# Patient Record
Sex: MALE | Race: WHITE | NOT HISPANIC OR LATINO | Employment: OTHER | URBAN - NONMETROPOLITAN AREA
[De-identification: names, ages, dates, MRNs, and addresses within clinical notes are randomized per-mention and may not be internally consistent; named-entity substitution may affect disease eponyms.]

---

## 2017-01-11 ENCOUNTER — ANESTHESIA EVENT (OUTPATIENT)
Dept: GASTROENTEROLOGY | Facility: HOSPITAL | Age: 82
End: 2017-01-11

## 2017-01-12 ENCOUNTER — ANESTHESIA (OUTPATIENT)
Dept: GASTROENTEROLOGY | Facility: HOSPITAL | Age: 82
End: 2017-01-12

## 2017-01-12 PROCEDURE — 25010000002 PROPOFOL 10 MG/ML EMULSION: Performed by: NURSE ANESTHETIST, CERTIFIED REGISTERED

## 2017-01-12 RX ORDER — PROPOFOL 10 MG/ML
VIAL (ML) INTRAVENOUS AS NEEDED
Status: DISCONTINUED | OUTPATIENT
Start: 2017-01-12 | End: 2017-01-12 | Stop reason: SURG

## 2017-01-12 RX ADMIN — PROPOFOL 50 MG: 10 INJECTION, EMULSION INTRAVENOUS at 08:22

## 2017-01-12 RX ADMIN — PROPOFOL 50 MG: 10 INJECTION, EMULSION INTRAVENOUS at 08:16

## 2017-01-12 RX ADMIN — PROPOFOL 50 MG: 10 INJECTION, EMULSION INTRAVENOUS at 08:15

## 2017-01-12 RX ADMIN — PROPOFOL 50 MG: 10 INJECTION, EMULSION INTRAVENOUS at 08:17

## 2017-01-12 RX ADMIN — PROPOFOL 50 MG: 10 INJECTION, EMULSION INTRAVENOUS at 08:13

## 2017-01-12 RX ADMIN — PROPOFOL 50 MG: 10 INJECTION, EMULSION INTRAVENOUS at 08:20

## 2017-01-12 RX ADMIN — PROPOFOL 50 MG: 10 INJECTION, EMULSION INTRAVENOUS at 08:24

## 2017-01-12 RX ADMIN — PROPOFOL 50 MG: 10 INJECTION, EMULSION INTRAVENOUS at 08:27

## 2017-01-12 NOTE — ANESTHESIA PREPROCEDURE EVALUATION
Anesthesia Evaluation      Airway   Mallampati: II  TM distance: >3 FB  Neck ROM: full  no difficulty expected  Dental - normal exam     Pulmonary - normal exam   (-) not a smoker  Cardiovascular - normal exam  (+) hypertension well controlled,   (-) past MI, angina, CABG    Neuro/Psych  (-) seizures, TIA, CVA  GI/Hepatic/Renal/Endo    (+) obesity,    (-) hiatal hernia, GERD, liver disease, renal disease    Musculoskeletal     Abdominal    Substance History   (+) alcohol use,      OB/GYN negative ob/gyn ROS         Other   (+) arthritis                          Anesthesia Plan    ASA 2     general     intravenous induction   Anesthetic plan and risks discussed with patient.

## 2017-01-12 NOTE — ANESTHESIA POSTPROCEDURE EVALUATION
Patient: Matthieu Simpson    Procedure Summary     Date Anesthesia Start Anesthesia Stop Room / Location    01/12/17 0811 0832  [facility] / [facility]       Procedure Diagnosis Surgeon Provider    COLONOSCOPY WITH ANESTHESIA (N/A ) History of colon cancer  (History of colon cancer [Z85.038]) DO Krishna Knight CRNA          Anesthesia Type: general  Last vitals  /75 (01/12/17 0734)    Temp 97.9 °F (36.6 °C) (01/12/17 0734)    Pulse 62 (01/12/17 0734)   Resp 20 (01/12/17 0734)    SpO2 95 % (01/12/17 0734)      Post Anesthesia Care and Evaluation    Patient location during evaluation: PHASE II  Patient participation: complete - patient participated  Level of consciousness: awake and sleepy but conscious  Pain score: 0  Pain management: adequate  Airway patency: patent  Anesthetic complications: No anesthetic complications  Cardiovascular status: acceptable  Respiratory status: acceptable  Hydration status: acceptable

## 2017-01-13 ENCOUNTER — TELEPHONE (OUTPATIENT)
Dept: GASTROENTEROLOGY | Facility: CLINIC | Age: 82
End: 2017-01-13

## 2017-01-24 ENCOUNTER — TRANSCRIBE ORDERS (OUTPATIENT)
Dept: ADMINISTRATIVE | Facility: HOSPITAL | Age: 82
End: 2017-01-24

## 2017-01-24 DIAGNOSIS — F03.90 SENILE DEMENTIA, UNCOMPLICATED (HCC): Primary | ICD-10-CM

## 2017-01-30 ENCOUNTER — HOSPITAL ENCOUNTER (OUTPATIENT)
Dept: ULTRASOUND IMAGING | Facility: HOSPITAL | Age: 82
Discharge: HOME | End: 2017-01-30
Attending: INTERNAL MEDICINE | Admitting: INTERNAL MEDICINE

## 2017-01-30 ENCOUNTER — HOSPITAL ENCOUNTER (OUTPATIENT)
Dept: MRI IMAGING | Facility: HOSPITAL | Age: 82
Discharge: HOME | End: 2017-01-30
Attending: INTERNAL MEDICINE

## 2017-01-30 DIAGNOSIS — F03.90 SENILE DEMENTIA, UNCOMPLICATED (HCC): ICD-10-CM

## 2017-01-30 LAB — CREAT BLDA-MCNC: 0.9 MG/DL (ref 0.6–1.3)

## 2017-01-30 PROCEDURE — 93880 EXTRACRANIAL BILAT STUDY: CPT | Performed by: SURGERY

## 2017-01-30 PROCEDURE — 82565 ASSAY OF CREATININE: CPT

## 2017-01-30 PROCEDURE — 70553 MRI BRAIN STEM W/O & W/DYE: CPT

## 2017-01-30 PROCEDURE — 0 GADOBENATE DIMEGLUMINE 529 MG/ML SOLUTION: Performed by: INTERNAL MEDICINE

## 2017-01-30 PROCEDURE — 93880 EXTRACRANIAL BILAT STUDY: CPT

## 2017-01-30 PROCEDURE — A9577 INJ MULTIHANCE: HCPCS | Performed by: INTERNAL MEDICINE

## 2017-01-30 RX ADMIN — GADOBENATE DIMEGLUMINE 19 ML: 529 INJECTION, SOLUTION INTRAVENOUS at 14:45

## 2017-10-02 ENCOUNTER — OFFICE VISIT (OUTPATIENT)
Dept: UROLOGY | Facility: CLINIC | Age: 82
End: 2017-10-02

## 2017-10-02 VITALS
HEIGHT: 71 IN | WEIGHT: 205.8 LBS | SYSTOLIC BLOOD PRESSURE: 124 MMHG | TEMPERATURE: 96.4 F | BODY MASS INDEX: 28.81 KG/M2 | DIASTOLIC BLOOD PRESSURE: 80 MMHG

## 2017-10-02 DIAGNOSIS — N13.8 BPH WITH OBSTRUCTION/LOWER URINARY TRACT SYMPTOMS: ICD-10-CM

## 2017-10-02 DIAGNOSIS — R97.20 ELEVATED PSA: Primary | ICD-10-CM

## 2017-10-02 DIAGNOSIS — N52.9 IMPOTENCE DUE TO ERECTILE DYSFUNCTION: ICD-10-CM

## 2017-10-02 DIAGNOSIS — N40.1 BPH WITH OBSTRUCTION/LOWER URINARY TRACT SYMPTOMS: ICD-10-CM

## 2017-10-02 LAB
BILIRUB BLD-MCNC: NEGATIVE MG/DL
CLARITY, POC: CLEAR
COLOR UR: YELLOW
GLUCOSE UR STRIP-MCNC: NEGATIVE MG/DL
KETONES UR QL: NEGATIVE
LEUKOCYTE EST, POC: NEGATIVE
NITRITE UR-MCNC: NEGATIVE MG/ML
PH UR: 5 [PH] (ref 5–8)
PROT UR STRIP-MCNC: NEGATIVE MG/DL
RBC # UR STRIP: NEGATIVE /UL
SP GR UR: 1.01 (ref 1–1.03)
UROBILINOGEN UR QL: NORMAL

## 2017-10-02 PROCEDURE — 81003 URINALYSIS AUTO W/O SCOPE: CPT | Performed by: UROLOGY

## 2017-10-02 PROCEDURE — 99214 OFFICE O/P EST MOD 30 MIN: CPT | Performed by: UROLOGY

## 2017-10-02 NOTE — PROGRESS NOTES
Subjective    Mr. Simpson is 81 y.o. male    CHIEF COMPLAINT: Elevated PSA    The patient has recently seen Dr. Cho he apparently for ED he did a load of tests on him and basically this showed a testosterone of 442 which I 21 for his age was well within normal limits his free testosterone was a little bit low but I told him in my opinion that I did not think taking testosterone be beneficial beneficial.    He also was noted to have elevated PSA of 6.2 he has had elevated PSAs before he has not had a biopsy on his PSA last year was 5.3 so it up a little bit has bounced around slightly over the years.    From a BPH point review his AUA score today is low at 7 he denies any gross hematuria he has occasional nocturia 1-2 no urgency frequency burning stinging in his symptoms are quite stable he has had no previous  operations    From an ED point review the they have he is Got a prescription for new pill to Dr. Feng Aranda gave him and he would like to try that and I certainly think is reasonable Cialis was not beneficial      History of Present Illness      The following portions of the patient's history were reviewed and updated as appropriate: allergies, current medications, past family history, past medical history, past social history, past surgical history and problem list.    Review of Systems   Constitutional: Negative.  Negative for chills and fever.   Gastrointestinal: Negative for abdominal distention, abdominal pain, anal bleeding, blood in stool and nausea.   Genitourinary: Negative for difficulty urinating, dysuria, flank pain, frequency, hematuria and urgency.   Psychiatric/Behavioral: Negative.  Negative for agitation and confusion.         Current Outpatient Prescriptions:   •  colesevelam (WELCHOL) 625 MG tablet, Take 1,875 mg by mouth 2 (Two) Times a Day With Meals., Disp: , Rfl:   •  esomeprazole (nexIUM) 40 MG capsule, Take 40 mg by mouth Every Morning Before Breakfast., Disp: , Rfl:   •   "fexofenadine (ALLEGRA) 180 MG tablet, Take 180 mg by mouth Daily., Disp: , Rfl:   •  levocetirizine (XYZAL) 5 MG tablet, Take 5 mg by mouth Every Evening., Disp: , Rfl:   •  metoprolol succinate XL (TOPROL-XL) 25 MG 24 hr tablet, Take 25 mg by mouth 2 (Two) Times a Day., Disp: , Rfl:   •  omeprazole (PriLOSEC) 40 MG capsule, Take 40 mg by mouth Daily., Disp: , Rfl:   •  ZOLPIDEM TARTRATE PO, Take 5 mg by mouth Daily., Disp: , Rfl:     Past Medical History:   Diagnosis Date   • Allergic rhinitis    • Anemia    • Arthritis    • BPH without obstruction/lower urinary tract symptoms    • Cancer     colon cancer   • Elevated PSA    • Hypertension        Past Surgical History:   Procedure Laterality Date   • APPENDECTOMY     • CHOLECYSTECTOMY     • COLON SURGERY  12/19/2007    colectomy   • COLONOSCOPY  12/19/2014   • COLONOSCOPY N/A 1/12/2017    Procedure: COLONOSCOPY WITH ANESTHESIA;  Surgeon: Aleks Garcia DO;  Location: [facility] ENDOSCOPY;  Service:    • GALLBLADDER SURGERY         Social History     Social History   • Marital status: Single     Spouse name: N/A   • Number of children: N/A   • Years of education: N/A     Social History Main Topics   • Smoking status: Former Smoker   • Smokeless tobacco: Never Used   • Alcohol use Yes      Comment: 1-2 daily   • Drug use: No   • Sexual activity: Defer     Other Topics Concern   • None     Social History Narrative       Family History   Problem Relation Age of Onset   • Liver cancer Father    • No Known Problems Mother    • Colon polyps Neg Hx    • Colon cancer Neg Hx        Objective    /80  Temp 96.4 °F (35.8 °C)  Ht 71\" (180.3 cm)  Wt 205 lb 12.8 oz (93.4 kg)  BMI 28.7 kg/m2    Physical Exam   Constitutional: He is oriented to person, place, and time. He appears well-developed and well-nourished.   Pulmonary/Chest: Effort normal.   Abdominal: Soft. He exhibits no distension and no mass. There is no tenderness. There is no rebound and no guarding. No " hernia.   Genitourinary: Penis normal. Rectal exam shows no mass, no tenderness and anal tone normal. Enlarged: for the age of the patient. Right testis shows no mass, no swelling and no tenderness. Left testis shows no mass, no swelling and no tenderness. No hypospadias. No discharge found.   Genitourinary Comments:  The urethral meatus normal in position without evidence of stricture. Epididymis without mass or tenderness. Vas Deferens is palpably normal.Anus and perineum without mass or tenderness. The prostate is approximately 30 ml. It is Symmetric, with a Soft consistency. There are no nodules present. . The seminal vesicles are Not palpable due to the size of the prostate.     Neurological: He is alert and oriented to person, place, and time.   Vitals reviewed.        Hospital Outpatient Visit on 01/30/2017   Component Date Value Ref Range Status   • Creatinine 01/30/2017 0.90  0.60 - 1.30 mg/dL Final    Serial Number: [id number]    : [id number]       Results for orders placed or performed in visit on 10/02/17   POC Urinalysis Dipstick, Automated   Result Value Ref Range    Color Yellow Yellow, Straw, Dark Yellow, Caryn    Clarity, UA Clear Clear    Glucose, UA Negative Negative, 1000 mg/dL (3+) mg/dL    Bilirubin Negative Negative    Ketones, UA Negative Negative    Specific Gravity  1.010 1.005 - 1.030    Blood, UA Negative Negative    pH, Urine 5.0 5.0 - 8.0    Protein, POC Negative Negative mg/dL    Urobilinogen, UA Normal Normal    Leukocytes Negative Negative    Nitrite, UA Negative Negative       Assessment and Plan    Matthieu was seen today for elevated psa.    Diagnoses and all orders for this visit:    Elevated PSA  -     POC Urinalysis Dipstick, Automated  -     PSA, Total & Free; Future    Impotence due to erectile dysfunction    BPH with obstruction/lower urinary tract symptoms    Plan--from an ED point review I told him again as noted above I probably would not take testosterone I do not think  in my professional opinion that this would be beneficial.    Also suggested that certainly try medication Dr. Aranda who given him is reasonable this does not work he will let me know.    From elevated PSA point review I told that this is just minimally elevated for his age that again we do not even check these and a lot of patients but certainly I think it would not be unreasonable to follow him up in 6 months we will repeat a PSA free and total make sure is not changing rapidly he was comes this he had no further questions.    From a BPH point review he is doing well and is not any medications we will just observe this

## 2017-10-07 ENCOUNTER — RESULTS ENCOUNTER (OUTPATIENT)
Dept: UROLOGY | Facility: CLINIC | Age: 82
End: 2017-10-07

## 2017-10-07 DIAGNOSIS — R97.20 ELEVATED PSA: ICD-10-CM

## 2018-03-21 LAB
PSA FREE MFR SERPL: 26.2 %
PSA FREE SERPL-MCNC: 1.81 NG/ML
PSA SERPL-MCNC: 6.9 NG/ML (ref 0–4)

## 2018-03-29 ENCOUNTER — OFFICE VISIT (OUTPATIENT)
Dept: UROLOGY | Facility: CLINIC | Age: 83
End: 2018-03-29

## 2018-03-29 DIAGNOSIS — N13.8 BPH WITH OBSTRUCTION/LOWER URINARY TRACT SYMPTOMS: ICD-10-CM

## 2018-03-29 DIAGNOSIS — N40.1 BPH WITH OBSTRUCTION/LOWER URINARY TRACT SYMPTOMS: ICD-10-CM

## 2018-03-29 DIAGNOSIS — R97.20 ELEVATED PSA: Primary | ICD-10-CM

## 2018-03-29 LAB
BILIRUB BLD-MCNC: NEGATIVE MG/DL
CLARITY, POC: CLEAR
COLOR UR: YELLOW
GLUCOSE UR STRIP-MCNC: NEGATIVE MG/DL
KETONES UR QL: NEGATIVE
LEUKOCYTE EST, POC: NEGATIVE
NITRITE UR-MCNC: NEGATIVE MG/ML
PH UR: 5.5 [PH] (ref 5–8)
PROT UR STRIP-MCNC: NEGATIVE MG/DL
RBC # UR STRIP: NEGATIVE /UL
SP GR UR: 1.03 (ref 1–1.03)
UROBILINOGEN UR QL: NORMAL

## 2018-03-29 PROCEDURE — 99214 OFFICE O/P EST MOD 30 MIN: CPT | Performed by: UROLOGY

## 2018-03-29 PROCEDURE — 81001 URINALYSIS AUTO W/SCOPE: CPT | Performed by: UROLOGY

## 2018-03-29 NOTE — PATIENT INSTRUCTIONS

## 2018-03-29 NOTE — PROGRESS NOTES
Subjective    Mr. Simpson is 82 y.o. male    CHIEF COMPLAINT: Elevated PSA    The patient comes back today for follow-up of an elevated PSA his most recently has increased and it was 6.8 with a free PSA of 26%.    He has not had a biopsy but he has had a slightly elevated PSA for many years.    From a BPH point review he is doing very well he does not take any medications for this he is not any gross hematuria is had no flank pain no real burning stinging urgency frequency as a way score today is only 4      History of Present Illness      The following portions of the patient's history were reviewed and updated as appropriate: allergies, current medications, past family history, past medical history, past social history, past surgical history and problem list.    Review of Systems   Constitutional: Negative.  Negative for chills and fever.   Gastrointestinal: Negative for abdominal distention, abdominal pain, anal bleeding, blood in stool and nausea.   Genitourinary: Negative for difficulty urinating, dysuria, flank pain, frequency, hematuria and urgency.   Psychiatric/Behavioral: Negative.  Negative for agitation and confusion.         Current Outpatient Prescriptions:   •  colesevelam (WELCHOL) 625 MG tablet, Take 1,875 mg by mouth 2 (Two) Times a Day With Meals., Disp: , Rfl:   •  esomeprazole (nexIUM) 40 MG capsule, Take 40 mg by mouth Every Morning Before Breakfast., Disp: , Rfl:   •  fexofenadine (ALLEGRA) 180 MG tablet, Take 180 mg by mouth Daily., Disp: , Rfl:   •  levocetirizine (XYZAL) 5 MG tablet, Take 5 mg by mouth Every Evening., Disp: , Rfl:   •  metoprolol succinate XL (TOPROL-XL) 25 MG 24 hr tablet, Take 25 mg by mouth 2 (Two) Times a Day., Disp: , Rfl:   •  omeprazole (PriLOSEC) 40 MG capsule, Take 40 mg by mouth Daily., Disp: , Rfl:   •  ZOLPIDEM TARTRATE PO, Take 5 mg by mouth Daily., Disp: , Rfl:     Past Medical History:   Diagnosis Date   • Allergic rhinitis    • Anemia    • Arthritis    • BPH  without obstruction/lower urinary tract symptoms    • Cancer     colon cancer   • Elevated PSA    • Hypertension        Past Surgical History:   Procedure Laterality Date   • APPENDECTOMY     • CHOLECYSTECTOMY     • COLON SURGERY  12/19/2007    colectomy   • COLONOSCOPY  12/19/2014   • COLONOSCOPY N/A 1/12/2017    Procedure: COLONOSCOPY WITH ANESTHESIA;  Surgeon: Aleks Garcia DO;  Location: [facility] ENDOSCOPY;  Service:    • GALLBLADDER SURGERY         Social History     Social History   • Marital status: Single     Social History Main Topics   • Smoking status: Former Smoker   • Smokeless tobacco: Never Used   • Alcohol use Yes      Comment: 1-2 daily   • Drug use: No   • Sexual activity: Defer     Other Topics Concern   • Not on file       Family History   Problem Relation Age of Onset   • Liver cancer Father    • No Known Problems Mother    • Colon polyps Neg Hx    • Colon cancer Neg Hx        Objective    There were no vitals taken for this visit.    Physical Exam      Results Encounter on 10/07/2017   Component Date Value Ref Range Status   • PSA 03/21/2018 6.9* 0.0 - 4.0 ng/mL Final    Comment: Roche ECLIA methodology.  According to the American Urological Association, Serum PSA should  decrease and remain at undetectable levels after radical  prostatectomy. The AUA defines biochemical recurrence as an initial  PSA value 0.2 ng/mL or greater followed by a subsequent confirmatory  PSA value 0.2 ng/mL or greater.  Values obtained with different assay methods or kits cannot be used  interchangeably. Results cannot be interpreted as absolute evidence  of the presence or absence of malignant disease.     • PSA, Free 03/21/2018 1.81  N/A ng/mL Final   • PSA, Free % 03/21/2018 26.2  % Final    Comment: The table below lists the probability of prostate cancer for  men with non-suspicious MILLER results and total PSA between  4 and 10 ng/mL, by patient age (Seven et al, ESPERANZA 1998,  279:1542).                    %  Free PSA       50-64 yr        65-75 yr                    0.00-10.00%        56%             55%                   10.01-15.00%        24%             35%                   15.01-20.00%        17%             23%                   20.01-25.00%        10%             20%                        >25.00%         5%              9%  Please note:  Seven et al did not make specific                recommendations regarding the use of                percent free PSA for any other population                of men.         Results for orders placed or performed in visit on 03/29/18   POC Urinalysis Dipstick, Automated   Result Value Ref Range    Color Yellow Yellow, Straw, Dark Yellow, Caryn    Clarity, UA Clear Clear    Glucose, UA Negative Negative, 1000 mg/dL (3+) mg/dL    Bilirubin Negative Negative    Ketones, UA Negative Negative    Specific Gravity  1.030 1.005 - 1.030    Blood, UA Negative Negative    pH, Urine 5.5 5.0 - 8.0    Protein, POC Negative Negative mg/dL    Urobilinogen, UA Normal Normal    Leukocytes Negative Negative    Nitrite, UA Negative Negative       Assessment and Plan    Matthieu was seen today for elevated psa.    Diagnoses and all orders for this visit:    Elevated PSA  -     POC Urinalysis Dipstick, Automated  -     PSA DIAGNOSTIC; Future    BPH with obstruction/lower urinary tract symptoms      Plan--from the see the patient back again in 1 year's time I did discussed the PSA with him and his potential risk for prostate cancer I told him that I cannot guarantee him that he does not have prostate cancer but certainly with the free PSA being okay and at his age probably would not worry about it too much and he is in agreement he does not want to proceed with the biopsy.    From a BPH point review he is stable as well we will seen back again in 1 year with a PSA call sooner as needed

## 2018-04-03 ENCOUNTER — RESULTS ENCOUNTER (OUTPATIENT)
Dept: UROLOGY | Facility: CLINIC | Age: 83
End: 2018-04-03

## 2018-04-03 DIAGNOSIS — R97.20 ELEVATED PSA: ICD-10-CM

## 2018-11-27 ENCOUNTER — TELEPHONE (OUTPATIENT)
Dept: GASTROENTEROLOGY | Facility: CLINIC | Age: 83
End: 2018-11-27

## 2018-11-27 NOTE — TELEPHONE ENCOUNTER
Pt records from physician reviewed  According to medical record Matthieu Simpson does not have a history of heart disease or lung disease.  According to medical record Matthieu Simpson is not currently on blood thinner.  According to medical record Matthieu Simpson is not currently exhibiting abdominal pain, weight loss, hematochezia, melena,  change in bowels, or other symptoms to indicate pt would need to be seen in office.    Will submit order for Matthieu Simpson to proceed with CScope    Medication will be verified as well as a lack of GI related symptomology when pt is scheduled for procedure.    Pt called wishing to schedule apt for cscope    He has a hx of CRC

## 2019-01-21 NOTE — PROGRESS NOTES
Chief Complaint   Patient presents with   • Colonoscopy     1-12-17 colon 2 year recall diverticulosis       PCP: Francis Aranda MD  REFER: No ref. provider found    Subjective     HPI    Matthieu Simpson is a 83 y.o. male who presents to office for preventative maintenance.  There is  a personal history of colon polyps.  There is a history of colon cancer-2007.  He does not have complaints of nausea/vomiting, change in bowels, weight loss, no BRBPR, no melena.  There is not a family history of colon cancer.  There is not a family history of colon polyps.  Pt last colonoscopy-1/2017 .  Bowels do move on regular basis.    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    • Cancer (CMS/HCC)     colon cancer   • Elevated PSA    • Hypertension      Past Surgical History:   Procedure Laterality Date   • APPENDECTOMY     • CHOLECYSTECTOMY     • COLON SURGERY  12/19/2007    colectomy   • COLONOSCOPY  12/19/2014   • COLONOSCOPY N/A 1/12/2017    Procedure: COLONOSCOPY WITH ANESTHESIA;  Surgeon: Aleks Garcia DO;  Location: [facility] ENDOSCOPY;  Service:    • GALLBLADDER SURGERY       Outpatient Medications Marked as Taking for the 1/22/19 encounter (Office Visit) with Vijay ePrry APRN   Medication Sig Dispense Refill   • citalopram (CeleXA) 20 MG tablet Take 20 mg by mouth Daily.     • Loperamide HCl (ANTI-DIARRHEAL PO) Take  by mouth.     • metoprolol succinate XL (TOPROL-XL) 25 MG 24 hr tablet Take 25 mg by mouth 2 (Two) Times a Day.     • omeprazole (PriLOSEC) 40 MG capsule Take 40 mg by mouth Daily.       Allergies    Allergen Reactions   • Amoxicillin-Pot Clavulanate      Social History     Socioeconomic History   • Marital status: Single     Spouse name: Not on file   • Number of children: Not on file   • Years of education: Not on file   • Highest education level: Not on file   Social Needs   • Financial resource strain: Not on file   • Food insecurity - worry: Not on file   • Food insecurity - inability: Not on file   • Transportation needs - medical: Not on file   • Transportation needs - non-medical: Not on file   Occupational History   • Not on file   Tobacco Use   • Smoking status: Former Smoker     Last attempt to quit: 195     Years since quittin.0   • Smokeless tobacco: Never Used   Substance and Sexual Activity   • Alcohol use: Yes     Comment: sometimes   • Drug use: No   • Sexual activity: Defer   Other Topics Concern   • Not on file   Social History Narrative   • Not on file     Family History   Problem Relation Age of Onset   • Liver cancer Father    • No Known Problems Mother    • Colon polyps Neg Hx    • Colon cancer Neg Hx      Review of Systems   Constitutional: Negative for fatigue, fever and unexpected weight change.   HENT: Negative for hearing loss, sore throat and voice change.    Eyes: Negative for visual disturbance.   Respiratory: Negative for cough, shortness of breath and wheezing.    Cardiovascular: Negative for chest pain and palpitations.   Gastrointestinal: Negative for abdominal pain, blood in stool and vomiting.   Endocrine: Negative for polydipsia and polyuria.   Genitourinary: Negative for difficulty urinating, dysuria, hematuria and urgency.   Musculoskeletal: Negative for joint swelling and myalgias.   Skin: Negative for color change, rash and wound.   Neurological: Negative for dizziness, tremors, seizures and syncope.   Hematological: Does not bruise/bleed easily.   Psychiatric/Behavioral: Negative for agitation and confusion. The patient is not nervous/anxious.      Objective    Vitals:    01/22/19 1408   BP: 120/80   Pulse: 67   Temp: 97.4 °F (36.3 °C)   SpO2: 98%     Physical Exam   Constitutional: He is oriented to person, place, and time. He appears well-developed and well-nourished. He is cooperative.   HENT:   Head: Normocephalic and atraumatic.   Eyes: Conjunctivae are normal. Pupils are equal, round, and reactive to light. No scleral icterus.   Neck: Normal range of motion. Neck supple. No JVD present. No thyroid mass and no thyromegaly present.   Cardiovascular: Normal rate, regular rhythm and normal heart sounds. Exam reveals no gallop and no friction rub.   No murmur heard.  Pulmonary/Chest: Effort normal and breath sounds normal. No accessory muscle usage. No respiratory distress. He has no wheezes. He has no rales.   Abdominal: Soft. Normal appearance and bowel sounds are normal. He exhibits no distension, no ascites and no mass. There is no hepatosplenomegaly. There is no tenderness. There is no rebound and no guarding.   Musculoskeletal: Normal range of motion. He exhibits no edema or tenderness.     Vascular Status -  His right foot exhibits normal foot vasculature  and no edema. His left foot exhibits normal foot vasculature  and no edema.  Lymphadenopathy:     He has no cervical adenopathy.   Neurological: He is alert and oriented to person, place, and time. He has normal strength. Gait normal.   Skin: Skin is warm, dry and intact. No rash noted.     Imaging Results (most recent)     None        Body mass index is 29.7 kg/m².    Assessment/Plan   Matthieu was seen today for colonoscopy.    Diagnoses and all orders for this visit:    History of colon cancer  -     Case Request; Standing  -     Implement Anesthesia Orders Day of Procedure; Standing  -     Obtain Informed Consent; Standing  -     polyethylene glycol (GoLYTELY) 236 g solution; Take 3,785 mL by mouth 1 (One) Time for 1 dose. Take as directed  -     Case Request    History of colon polyps      COLONOSCOPY WITH  ANESTHESIA (N/A)    Patient is to continue all blood pressure and cardiac medications prior to procedure and has been advised to take medications morning of procedure   Pt verbalized understanding      All risks, benefits, alternatives, and indications of colonoscopy procedure have been discussed with the patient. Risks to include perforation of the colon requiring possible surgery or colostomy, risk of bleeding from biopsies or removal of colon tissue, possibility of missing a colon polyp or cancer, or adverse drug reaction.  Benefits to include the diagnosis and management of disease of the colon and rectum. Alternatives to include barium enema, radiographic evaluation, lab testing or no intervention. Pt verbalizes understanding and agrees.     Patient's Body mass index is 29.7 kg/m². BMI is above normal parameters. Recommendations include: no follow-up required.      There are no Patient Instructions on file for this visit.

## 2019-01-22 ENCOUNTER — OFFICE VISIT (OUTPATIENT)
Dept: GASTROENTEROLOGY | Facility: CLINIC | Age: 84
End: 2019-01-22

## 2019-01-22 VITALS
BODY MASS INDEX: 29.63 KG/M2 | SYSTOLIC BLOOD PRESSURE: 120 MMHG | HEIGHT: 70 IN | HEART RATE: 67 BPM | TEMPERATURE: 97.4 F | DIASTOLIC BLOOD PRESSURE: 80 MMHG | OXYGEN SATURATION: 98 % | WEIGHT: 207 LBS

## 2019-01-22 DIAGNOSIS — Z86.010 HISTORY OF COLON POLYPS: ICD-10-CM

## 2019-01-22 DIAGNOSIS — Z85.038 HISTORY OF COLON CANCER: Primary | ICD-10-CM

## 2019-01-22 PROCEDURE — S0260 H&P FOR SURGERY: HCPCS | Performed by: NURSE PRACTITIONER

## 2019-01-22 RX ORDER — CITALOPRAM 20 MG/1
20 TABLET ORAL DAILY
COMMUNITY
End: 2020-07-16

## 2019-01-31 ENCOUNTER — ANESTHESIA EVENT (OUTPATIENT)
Dept: GASTROENTEROLOGY | Facility: HOSPITAL | Age: 84
End: 2019-01-31

## 2019-01-31 ENCOUNTER — HOSPITAL ENCOUNTER (OUTPATIENT)
Facility: HOSPITAL | Age: 84
Setting detail: HOSPITAL OUTPATIENT SURGERY
Discharge: HOME | End: 2019-01-31
Attending: INTERNAL MEDICINE | Admitting: INTERNAL MEDICINE

## 2019-01-31 ENCOUNTER — ANESTHESIA (OUTPATIENT)
Dept: GASTROENTEROLOGY | Facility: HOSPITAL | Age: 84
End: 2019-01-31

## 2019-01-31 VITALS
RESPIRATION RATE: 21 BRPM | DIASTOLIC BLOOD PRESSURE: 79 MMHG | HEIGHT: 70 IN | OXYGEN SATURATION: 96 % | HEART RATE: 61 BPM | WEIGHT: 198 LBS | SYSTOLIC BLOOD PRESSURE: 156 MMHG | BODY MASS INDEX: 28.35 KG/M2 | TEMPERATURE: 97.7 F

## 2019-01-31 DIAGNOSIS — Z85.038 HISTORY OF COLON CANCER: ICD-10-CM

## 2019-01-31 PROCEDURE — 25010000002 PROPOFOL 10 MG/ML EMULSION: Performed by: NURSE ANESTHETIST, CERTIFIED REGISTERED

## 2019-01-31 PROCEDURE — G0105 COLORECTAL SCRN; HI RISK IND: HCPCS | Performed by: INTERNAL MEDICINE

## 2019-01-31 RX ORDER — SODIUM CHLORIDE 0.9 % (FLUSH) 0.9 %
3 SYRINGE (ML) INJECTION AS NEEDED
Status: DISCONTINUED | OUTPATIENT
Start: 2019-01-31 | End: 2019-01-31 | Stop reason: HOSPADM

## 2019-01-31 RX ORDER — PROPOFOL 10 MG/ML
VIAL (ML) INTRAVENOUS AS NEEDED
Status: DISCONTINUED | OUTPATIENT
Start: 2019-01-31 | End: 2019-01-31 | Stop reason: SURG

## 2019-01-31 RX ORDER — LIDOCAINE HYDROCHLORIDE 20 MG/ML
INJECTION, SOLUTION INFILTRATION; PERINEURAL AS NEEDED
Status: DISCONTINUED | OUTPATIENT
Start: 2019-01-31 | End: 2019-01-31 | Stop reason: SURG

## 2019-01-31 RX ORDER — SODIUM CHLORIDE 9 MG/ML
500 INJECTION, SOLUTION INTRAVENOUS CONTINUOUS PRN
Status: DISCONTINUED | OUTPATIENT
Start: 2019-01-31 | End: 2019-01-31 | Stop reason: HOSPADM

## 2019-01-31 RX ADMIN — SODIUM CHLORIDE 500 ML: 9 INJECTION, SOLUTION INTRAVENOUS at 09:38

## 2019-01-31 RX ADMIN — LIDOCAINE HYDROCHLORIDE 100 MG: 20 INJECTION, SOLUTION INFILTRATION; PERINEURAL at 09:55

## 2019-01-31 RX ADMIN — PROPOFOL 290 MG: 10 INJECTION, EMULSION INTRAVENOUS at 09:55

## 2019-01-31 NOTE — ANESTHESIA PREPROCEDURE EVALUATION
Anesthesia Evaluation     Patient summary reviewed   no history of anesthetic complications:  NPO Solid Status: > 8 hours             Airway   Mallampati: II  TM distance: >3 FB  Neck ROM: full  Dental      Pulmonary - negative pulmonary ROS   Cardiovascular   Exercise tolerance: excellent (>7 METS)    Patient on routine beta blocker and Beta blocker given within 24 hours of surgery    (+) hypertension,       Neuro/Psych- negative ROS  GI/Hepatic/Renal/Endo - negative ROS     Musculoskeletal     Abdominal    Substance History      OB/GYN          Other                        Anesthesia Plan    ASA 2     MAC     intravenous induction   Anesthetic plan, all risks, benefits, and alternatives have been provided, discussed and informed consent has been obtained with: patient.

## 2019-01-31 NOTE — ANESTHESIA POSTPROCEDURE EVALUATION
Patient: Matthieu Simpson    Procedure Summary     Date:  01/31/19 Room / Location:  [facility] ENDOSCOPY 6 / [facility] END[facility]COPY    Anesthesia Start:  0952 Anesthesia Stop:  1006    Procedure:  COLONOSCOPY WITH ANESTHESIA (N/A ) Diagnosis:       History of colon cancer      (History of colon cancer [Z85.038])    Surgeon:  Aleks Garcia DO Provider:  Americo Mckenna CRNA    Anesthesia Type:  MAC ASA Status:  2          Anesthesia Type: MAC  Last vitals  BP   156/79 (01/31/19 0908)   Temp   97.7 °F (36.5 °C) (01/31/19 0908)   Pulse   61 (01/31/19 0908)   Resp   21 (01/31/19 1025)     SpO2   96 % (01/31/19 0908)     Post Anesthesia Care and Evaluation    Patient location during evaluation: PHASE II  Level of consciousness: awake and alert  Pain management: adequate  Airway patency: patent  Anesthetic complications: No anesthetic complications    Cardiovascular status: acceptable  Respiratory status: acceptable  Hydration status: acceptable

## 2019-04-02 DIAGNOSIS — R97.20 ELEVATED PSA: ICD-10-CM

## 2019-04-02 DIAGNOSIS — R97.20 ELEVATED PSA: Primary | ICD-10-CM

## 2019-04-03 LAB — PSA SERPL-MCNC: 5.92 NG/ML (ref 0–4)

## 2019-04-16 ENCOUNTER — TELEPHONE (OUTPATIENT)
Dept: UROLOGY | Facility: CLINIC | Age: 84
End: 2019-04-16

## 2019-04-16 NOTE — TELEPHONE ENCOUNTER
Patient was a no show with Dr. Melton on 4- for yearly follow up. He was called and rescheduled with Dr. Ma. This is his 1st no show.

## 2019-04-19 NOTE — PROGRESS NOTES
Mr. Simpson is 83 y.o. male    CHIEF COMPLAINT: I am here for follow up on BPH. My PSA is 5.920    HPI    The patient comes back today for follow-up of an elevated PSA his most recently has increased and it was 6.8 with a free PSA of 26%.     He has not had a biopsy but he has had a slightly elevated PSA for many years.     From a BPH point review he is doing very well he does not take any medications for this he is not any gross hematuria is had no flank pain no real burning stinging urgency frequency as a way score today is only 4    Lab Results   Component Value Date    PSA 5.920 (H) 04/02/2019    PSA 6.9 (H) 03/20/2018    PSA 5.360 (H) 10/24/2016       The following portions of the patient's history were reviewed and updated as appropriate: allergies, current medications, past family history, past medical history, past social history, past surgical history and problem list.      Review of Systems   Constitutional: Negative for chills and fever.   Gastrointestinal: Negative for abdominal pain, anal bleeding and blood in stool.   Genitourinary: Negative for decreased urine volume, difficulty urinating, discharge, dysuria, enuresis, flank pain, frequency, genital sores, hematuria, penile pain, penile swelling, scrotal swelling, testicular pain and urgency.           Current Outpatient Medications:   •  citalopram (CeleXA) 20 MG tablet, Take 20 mg by mouth Daily., Disp: , Rfl:   •  fexofenadine (ALLEGRA) 180 MG tablet, Take 180 mg by mouth Daily., Disp: , Rfl:   •  levocetirizine (XYZAL) 5 MG tablet, Take 5 mg by mouth Every Evening., Disp: , Rfl:   •  Loperamide HCl (ANTI-DIARRHEAL PO), Take  by mouth., Disp: , Rfl:   •  metoprolol succinate XL (TOPROL-XL) 25 MG 24 hr tablet, Take 25 mg by mouth 2 (Two) Times a Day., Disp: , Rfl:   •  ZOLPIDEM TARTRATE PO, Take 5 mg by mouth Daily., Disp: , Rfl:     Past Medical History:   Diagnosis Date   • Allergic rhinitis    • Anemia    • Arthritis    • BPH without  "obstruction/lower urinary tract symptoms    • Cancer (CMS/HCC)     colon cancer   • Elevated PSA    • Hypertension        Past Surgical History:   Procedure Laterality Date   • APPENDECTOMY     • CHOLECYSTECTOMY     • COLON SURGERY  2007    colectomy   • COLONOSCOPY  2014   • COLONOSCOPY N/A 2017    Procedure: COLONOSCOPY WITH ANESTHESIA;  Surgeon: Aleks Garcia DO;  Location: [facility] ENDOSCOPY;  Service:    • COLONOSCOPY N/A 2019    Procedure: COLONOSCOPY WITH ANESTHESIA;  Surgeon: Aleks Garcia DO;  Location: [facility] ENDOSCOPY;  Service: Gastroenterology   • GALLBLADDER SURGERY         Social History     Socioeconomic History   • Marital status: Single     Spouse name: Not on file   • Number of children: Not on file   • Years of education: Not on file   • Highest education level: Not on file   Tobacco Use   • Smoking status: Former Smoker     Last attempt to quit: 1958     Years since quittin.3   • Smokeless tobacco: Never Used   Substance and Sexual Activity   • Alcohol use: Yes     Comment: sometimes   • Drug use: No   • Sexual activity: Defer       Family History   Problem Relation Age of Onset   • Liver cancer Father    • No Known Problems Mother    • Colon polyps Neg Hx    • Colon cancer Neg Hx          Temp 97.6 °F (36.4 °C)   Ht 177.8 cm (70\")   Wt 91.1 kg (200 lb 12.8 oz)   BMI 28.81 kg/m²       Physical Exam      Data  Results for orders placed or performed in visit on 19   POC Urinalysis Dipstick, Multipro   Result Value Ref Range    Color Yellow Yellow, Straw, Dark Yellow, Caryn    Clarity, UA Clear Clear    Glucose, UA Negative Negative, 1000 mg/dL (3+) mg/dL    Bilirubin Negative Negative    Ketones, UA Negative Negative    Specific Gravity  1.010 1.005 - 1.030    Blood, UA Negative Negative    pH, Urine 6.0 5.0 - 8.0    Protein, POC Negative Negative mg/dL    Urobilinogen, UA Normal Normal    Nitrite, UA Negative Negative    Leukocytes Negative Negative "         Patient's Body mass index is 28.81 kg/m². BMI is above normal parameters. Recommendations include: educational material.    International Prostate Symptom Score  The following is posted based on patient questionnaire answers:  0 - not at all    1-7 mild symptoms  1- Less than one time in five  8-19 moderate symptoms  2 -Less than half the time  20-35 severe symptoms  3 - About half the time  4 - More than half the time  5 - Almost always     For following sections:  Incomplete Emptying: - How often have you had the sensation  of not emptying your bladder completely after you finished urinating?  0   Frequency: -How often have you had to urinate again less than   two hours after you finished urinating?      1  Intermittency: -How often have you found you stopped and started again  Several times when you urinate?       0  Urgency: -How often do you find it difficult to postpone urination?             0  Weak stream: - How often have you had a weak urinary stream?             0  Straining: - How often have you had to push or strain to begin  Urination?          0  Sleeping: -How many times did you most typically get up to urinate   From the time you went to bed at night until the time you got up in the   2  Morning          Total `  3    Quality of Life  How would you feel if you had to live with your urinary condition the way   1  It is now, no better, no worse for the rest of your life?    Where: 0=delighted; 1= pleased, 2= mostly satisfied, 3= mixed, 4 = mostly  Dissatisfied, 5= Unhappy, 6 = terrible      Assessment and Plan  Diagnoses and all orders for this visit:    Elevated PSA    BPH with obstruction/lower urinary tract symptoms  -     POC Urinalysis Dipstick, Multipro    -Patient left without be evaluated or managed.     (Please note that portions of this note were completed with a voice recognition program.)  Juan Ma MD  04/29/19  9:33 AM      Cc: Francis Aranda MD

## 2019-04-29 ENCOUNTER — OFFICE VISIT (OUTPATIENT)
Dept: UROLOGY | Facility: CLINIC | Age: 84
End: 2019-04-29

## 2019-04-29 VITALS — WEIGHT: 200.8 LBS | BODY MASS INDEX: 28.75 KG/M2 | HEIGHT: 70 IN | TEMPERATURE: 97.6 F

## 2019-04-29 DIAGNOSIS — N40.1 BPH WITH OBSTRUCTION/LOWER URINARY TRACT SYMPTOMS: ICD-10-CM

## 2019-04-29 DIAGNOSIS — N13.8 BPH WITH OBSTRUCTION/LOWER URINARY TRACT SYMPTOMS: ICD-10-CM

## 2019-04-29 DIAGNOSIS — R97.20 ELEVATED PSA: Primary | ICD-10-CM

## 2019-04-29 LAB
BILIRUB BLD-MCNC: NEGATIVE MG/DL
CLARITY, POC: CLEAR
COLOR UR: YELLOW
GLUCOSE UR STRIP-MCNC: NEGATIVE MG/DL
KETONES UR QL: NEGATIVE
LEUKOCYTE EST, POC: NEGATIVE
NITRITE UR-MCNC: NEGATIVE MG/ML
PH UR: 6 [PH] (ref 5–8)
PROT UR STRIP-MCNC: NEGATIVE MG/DL
RBC # UR STRIP: NEGATIVE /UL
SP GR UR: 1.01 (ref 1–1.03)
UROBILINOGEN UR QL: NORMAL

## 2019-04-29 PROCEDURE — 99024 POSTOP FOLLOW-UP VISIT: CPT | Performed by: UROLOGY

## 2019-04-29 PROCEDURE — 81003 URINALYSIS AUTO W/O SCOPE: CPT | Performed by: UROLOGY

## 2019-04-29 NOTE — PATIENT INSTRUCTIONS

## 2019-07-05 ENCOUNTER — TELEPHONE (OUTPATIENT)
Dept: GASTROENTEROLOGY | Facility: CLINIC | Age: 84
End: 2019-07-05

## 2019-08-31 ENCOUNTER — APPOINTMENT (OUTPATIENT)
Dept: CT IMAGING | Age: 84
DRG: 065 | End: 2019-08-31
Payer: MEDICARE

## 2019-08-31 ENCOUNTER — APPOINTMENT (OUTPATIENT)
Dept: GENERAL RADIOLOGY | Age: 84
DRG: 065 | End: 2019-08-31
Payer: MEDICARE

## 2019-08-31 ENCOUNTER — HOSPITAL ENCOUNTER (INPATIENT)
Age: 84
LOS: 5 days | Discharge: NURSING, REHAB OR LONG-TERM CARE | DRG: 065 | End: 2019-09-05
Attending: EMERGENCY MEDICINE | Admitting: HOSPITALIST
Payer: MEDICARE

## 2019-08-31 DIAGNOSIS — T07.XXXA MULTIPLE BRUISES: ICD-10-CM

## 2019-08-31 DIAGNOSIS — I63.9 CEREBROVASCULAR ACCIDENT (CVA), UNSPECIFIED MECHANISM (HCC): ICD-10-CM

## 2019-08-31 DIAGNOSIS — R29.6 MULTIPLE FALLS: Primary | ICD-10-CM

## 2019-08-31 DIAGNOSIS — R41.0 CONFUSION: ICD-10-CM

## 2019-08-31 DIAGNOSIS — D72.829 LEUKOCYTOSIS, UNSPECIFIED TYPE: ICD-10-CM

## 2019-08-31 PROBLEM — I69.993 CVA, OLD, ATAXIA: Status: ACTIVE | Noted: 2019-08-31

## 2019-08-31 LAB
ALBUMIN SERPL-MCNC: 4.4 G/DL (ref 3.5–5.2)
ALP BLD-CCNC: 40 U/L (ref 40–130)
ALT SERPL-CCNC: 13 U/L (ref 5–41)
AMPHETAMINE SCREEN, URINE: NEGATIVE
ANION GAP SERPL CALCULATED.3IONS-SCNC: 17 MMOL/L (ref 7–19)
AST SERPL-CCNC: 33 U/L (ref 5–40)
BACTERIA: NEGATIVE /HPF
BARBITURATE SCREEN URINE: NEGATIVE
BENZODIAZEPINE SCREEN, URINE: NEGATIVE
BILIRUB SERPL-MCNC: 0.9 MG/DL (ref 0.2–1.2)
BILIRUBIN URINE: NEGATIVE
BLOOD, URINE: ABNORMAL
BUN BLDV-MCNC: 25 MG/DL (ref 8–23)
CALCIUM SERPL-MCNC: 9.5 MG/DL (ref 8.8–10.2)
CANNABINOID SCREEN URINE: NEGATIVE
CHLORIDE BLD-SCNC: 100 MMOL/L (ref 98–111)
CLARITY: CLEAR
CO2: 23 MMOL/L (ref 22–29)
COCAINE METABOLITE SCREEN URINE: NEGATIVE
COLOR: YELLOW
CREAT SERPL-MCNC: 1 MG/DL (ref 0.5–1.2)
EPITHELIAL CELLS, UA: 1 /HPF (ref 0–5)
GFR NON-AFRICAN AMERICAN: >60
GLUCOSE BLD-MCNC: 108 MG/DL (ref 74–109)
GLUCOSE URINE: NEGATIVE MG/DL
HCT VFR BLD CALC: 40.2 % (ref 42–52)
HEMOGLOBIN: 13.5 G/DL (ref 14–18)
HYALINE CASTS: 3 /HPF (ref 0–8)
INR BLD: 1.13 (ref 0.88–1.18)
KETONES, URINE: 15 MG/DL
LEUKOCYTE ESTERASE, URINE: NEGATIVE
Lab: NORMAL
MCH RBC QN AUTO: 31.5 PG (ref 27–31)
MCHC RBC AUTO-ENTMCNC: 33.6 G/DL (ref 33–37)
MCV RBC AUTO: 93.7 FL (ref 80–94)
NITRITE, URINE: NEGATIVE
OPIATE SCREEN URINE: NEGATIVE
PDW BLD-RTO: 12.6 % (ref 11.5–14.5)
PH UA: 6 (ref 5–8)
PLATELET # BLD: 240 K/UL (ref 130–400)
PMV BLD AUTO: 11.2 FL (ref 9.4–12.4)
POTASSIUM SERPL-SCNC: 4.1 MMOL/L (ref 3.5–5)
PROTEIN UA: NEGATIVE MG/DL
PROTHROMBIN TIME: 13.9 SEC (ref 12–14.6)
RBC # BLD: 4.29 M/UL (ref 4.7–6.1)
RBC UA: 1 /HPF (ref 0–4)
SODIUM BLD-SCNC: 140 MMOL/L (ref 136–145)
SPECIFIC GRAVITY UA: 1.03 (ref 1–1.03)
TOTAL CK: 1342 U/L (ref 39–308)
TOTAL PROTEIN: 6.8 G/DL (ref 6.6–8.7)
TROPONIN: <0.01 NG/ML (ref 0–0.03)
URINE REFLEX TO CULTURE: ABNORMAL
UROBILINOGEN, URINE: 0.2 E.U./DL
WBC # BLD: 17.1 K/UL (ref 4.8–10.8)
WBC UA: 1 /HPF (ref 0–5)

## 2019-08-31 PROCEDURE — 70496 CT ANGIOGRAPHY HEAD: CPT

## 2019-08-31 PROCEDURE — 99285 EMERGENCY DEPT VISIT HI MDM: CPT

## 2019-08-31 PROCEDURE — 1210000000 HC MED SURG R&B

## 2019-08-31 PROCEDURE — 73502 X-RAY EXAM HIP UNI 2-3 VIEWS: CPT

## 2019-08-31 PROCEDURE — 85610 PROTHROMBIN TIME: CPT

## 2019-08-31 PROCEDURE — 70498 CT ANGIOGRAPHY NECK: CPT

## 2019-08-31 PROCEDURE — 84484 ASSAY OF TROPONIN QUANT: CPT

## 2019-08-31 PROCEDURE — 36415 COLL VENOUS BLD VENIPUNCTURE: CPT

## 2019-08-31 PROCEDURE — 70450 CT HEAD/BRAIN W/O DYE: CPT

## 2019-08-31 PROCEDURE — 6360000004 HC RX CONTRAST MEDICATION: Performed by: EMERGENCY MEDICINE

## 2019-08-31 PROCEDURE — 82550 ASSAY OF CK (CPK): CPT

## 2019-08-31 PROCEDURE — 73560 X-RAY EXAM OF KNEE 1 OR 2: CPT

## 2019-08-31 PROCEDURE — 71045 X-RAY EXAM CHEST 1 VIEW: CPT

## 2019-08-31 PROCEDURE — 85027 COMPLETE CBC AUTOMATED: CPT

## 2019-08-31 PROCEDURE — 80053 COMPREHEN METABOLIC PANEL: CPT

## 2019-08-31 PROCEDURE — 72125 CT NECK SPINE W/O DYE: CPT

## 2019-08-31 PROCEDURE — 73080 X-RAY EXAM OF ELBOW: CPT

## 2019-08-31 RX ORDER — ASPIRIN 300 MG/1
300 SUPPOSITORY RECTAL ONCE
Status: COMPLETED | OUTPATIENT
Start: 2019-08-31 | End: 2019-09-01

## 2019-08-31 RX ADMIN — IOPAMIDOL 95 ML: 755 INJECTION, SOLUTION INTRAVENOUS at 22:49

## 2019-08-31 ASSESSMENT — ENCOUNTER SYMPTOMS
ABDOMINAL PAIN: 0
SHORTNESS OF BREATH: 0
VOMITING: 0
EYE PAIN: 0
BACK PAIN: 0
DIARRHEA: 0

## 2019-09-01 ENCOUNTER — APPOINTMENT (OUTPATIENT)
Dept: MRI IMAGING | Age: 84
DRG: 065 | End: 2019-09-01
Payer: MEDICARE

## 2019-09-01 PROBLEM — R41.3 MEMORY DEFICIT: Status: ACTIVE | Noted: 2019-09-01

## 2019-09-01 PROBLEM — F03.90 DEMENTIA (HCC): Status: ACTIVE | Noted: 2019-09-01

## 2019-09-01 PROBLEM — H91.90 HARD OF HEARING: Status: ACTIVE | Noted: 2019-09-01

## 2019-09-01 PROBLEM — R26.0 ATAXIA INVOLVING LEGS: Status: ACTIVE | Noted: 2019-08-31

## 2019-09-01 LAB
CHOLESTEROL, TOTAL: 164 MG/DL (ref 160–199)
EKG P AXIS: 70 DEGREES
EKG P-R INTERVAL: 162 MS
EKG Q-T INTERVAL: 404 MS
EKG QRS DURATION: 132 MS
EKG QTC CALCULATION (BAZETT): 433 MS
EKG T AXIS: -42 DEGREES
HBA1C MFR BLD: 5.5 % (ref 4–6)
HCT VFR BLD CALC: 39.1 % (ref 42–52)
HDLC SERPL-MCNC: 68 MG/DL (ref 55–121)
HEMOGLOBIN: 12.9 G/DL (ref 14–18)
LDL CHOLESTEROL CALCULATED: 87 MG/DL
LV EF: 55 %
LVEF MODALITY: NORMAL
MCH RBC QN AUTO: 31.5 PG (ref 27–31)
MCHC RBC AUTO-ENTMCNC: 33 G/DL (ref 33–37)
MCV RBC AUTO: 95.4 FL (ref 80–94)
PDW BLD-RTO: 12.7 % (ref 11.5–14.5)
PLATELET # BLD: 215 K/UL (ref 130–400)
PMV BLD AUTO: 11 FL (ref 9.4–12.4)
RBC # BLD: 4.1 M/UL (ref 4.7–6.1)
TRIGL SERPL-MCNC: 44 MG/DL (ref 0–149)
VITAMIN B-12: 220 PG/ML (ref 211–946)
WBC # BLD: 11.4 K/UL (ref 4.8–10.8)

## 2019-09-01 PROCEDURE — 93306 TTE W/DOPPLER COMPLETE: CPT

## 2019-09-01 PROCEDURE — 85027 COMPLETE CBC AUTOMATED: CPT

## 2019-09-01 PROCEDURE — 80307 DRUG TEST PRSMV CHEM ANLYZR: CPT

## 2019-09-01 PROCEDURE — 97161 PT EVAL LOW COMPLEX 20 MIN: CPT

## 2019-09-01 PROCEDURE — 80061 LIPID PANEL: CPT

## 2019-09-01 PROCEDURE — 36415 COLL VENOUS BLD VENIPUNCTURE: CPT

## 2019-09-01 PROCEDURE — 97116 GAIT TRAINING THERAPY: CPT

## 2019-09-01 PROCEDURE — 81001 URINALYSIS AUTO W/SCOPE: CPT

## 2019-09-01 PROCEDURE — 82607 VITAMIN B-12: CPT

## 2019-09-01 PROCEDURE — 93005 ELECTROCARDIOGRAM TRACING: CPT

## 2019-09-01 PROCEDURE — 83036 HEMOGLOBIN GLYCOSYLATED A1C: CPT

## 2019-09-01 PROCEDURE — 99223 1ST HOSP IP/OBS HIGH 75: CPT | Performed by: PSYCHIATRY & NEUROLOGY

## 2019-09-01 PROCEDURE — 6370000000 HC RX 637 (ALT 250 FOR IP): Performed by: EMERGENCY MEDICINE

## 2019-09-01 PROCEDURE — 2580000003 HC RX 258: Performed by: HOSPITALIST

## 2019-09-01 PROCEDURE — 6360000002 HC RX W HCPCS: Performed by: HOSPITALIST

## 2019-09-01 PROCEDURE — 6370000000 HC RX 637 (ALT 250 FOR IP): Performed by: HOSPITALIST

## 2019-09-01 PROCEDURE — 70551 MRI BRAIN STEM W/O DYE: CPT

## 2019-09-01 PROCEDURE — 93880 EXTRACRANIAL BILAT STUDY: CPT

## 2019-09-01 PROCEDURE — 1210000000 HC MED SURG R&B

## 2019-09-01 RX ORDER — SODIUM CHLORIDE 0.9 % (FLUSH) 0.9 %
10 SYRINGE (ML) INJECTION PRN
Status: DISCONTINUED | OUTPATIENT
Start: 2019-09-01 | End: 2019-09-05 | Stop reason: HOSPADM

## 2019-09-01 RX ORDER — SODIUM CHLORIDE 0.9 % (FLUSH) 0.9 %
10 SYRINGE (ML) INJECTION EVERY 12 HOURS SCHEDULED
Status: DISCONTINUED | OUTPATIENT
Start: 2019-09-01 | End: 2019-09-05 | Stop reason: HOSPADM

## 2019-09-01 RX ORDER — ATORVASTATIN CALCIUM 80 MG/1
80 TABLET, FILM COATED ORAL NIGHTLY
Status: DISCONTINUED | OUTPATIENT
Start: 2019-09-01 | End: 2019-09-05 | Stop reason: HOSPADM

## 2019-09-01 RX ORDER — ASPIRIN 81 MG/1
81 TABLET ORAL DAILY
Status: DISCONTINUED | OUTPATIENT
Start: 2019-09-01 | End: 2019-09-01

## 2019-09-01 RX ORDER — ONDANSETRON 2 MG/ML
4 INJECTION INTRAMUSCULAR; INTRAVENOUS EVERY 6 HOURS PRN
Status: DISCONTINUED | OUTPATIENT
Start: 2019-09-01 | End: 2019-09-05 | Stop reason: HOSPADM

## 2019-09-01 RX ORDER — ASPIRIN 300 MG/1
300 SUPPOSITORY RECTAL DAILY
Status: DISCONTINUED | OUTPATIENT
Start: 2019-09-01 | End: 2019-09-01

## 2019-09-01 RX ORDER — ASPIRIN 300 MG/1
300 SUPPOSITORY RECTAL DAILY
Status: DISCONTINUED | OUTPATIENT
Start: 2019-09-01 | End: 2019-09-05 | Stop reason: HOSPADM

## 2019-09-01 RX ADMIN — Medication 10 ML: at 13:48

## 2019-09-01 RX ADMIN — ASPIRIN 300 MG: 300 SUPPOSITORY RECTAL at 00:15

## 2019-09-01 RX ADMIN — ASPIRIN 325 MG: 325 TABLET, DELAYED RELEASE ORAL at 13:48

## 2019-09-01 RX ADMIN — Medication 10 ML: at 22:17

## 2019-09-01 RX ADMIN — ATORVASTATIN CALCIUM 80 MG: 80 TABLET, FILM COATED ORAL at 02:00

## 2019-09-01 RX ADMIN — ENOXAPARIN SODIUM 40 MG: 40 INJECTION SUBCUTANEOUS at 13:48

## 2019-09-01 RX ADMIN — ATORVASTATIN CALCIUM 80 MG: 80 TABLET, FILM COATED ORAL at 22:16

## 2019-09-01 ASSESSMENT — PAIN SCALES - WONG BAKER: WONGBAKER_NUMERICALRESPONSE: 4

## 2019-09-01 ASSESSMENT — PAIN DESCRIPTION - LOCATION
LOCATION: BACK
LOCATION: HIP

## 2019-09-01 ASSESSMENT — PAIN DESCRIPTION - DESCRIPTORS: DESCRIPTORS: ACHING

## 2019-09-01 ASSESSMENT — PAIN DESCRIPTION - PAIN TYPE
TYPE: CHRONIC PAIN
TYPE: ACUTE PAIN

## 2019-09-01 ASSESSMENT — ENCOUNTER SYMPTOMS
SHORTNESS OF BREATH: 0
NAUSEA: 0
BACK PAIN: 0
DIARRHEA: 0
CONSTIPATION: 0
VOMITING: 0

## 2019-09-01 ASSESSMENT — PAIN DESCRIPTION - ORIENTATION: ORIENTATION: RIGHT

## 2019-09-01 ASSESSMENT — PAIN SCALES - GENERAL: PAINLEVEL_OUTOF10: 1

## 2019-09-01 NOTE — PROGRESS NOTES
PRELIMINARY REPORT    RCCA= 58/13        LCCA= 80/14  AFSHAN= 91/82          LICA= 75/52  BILATERAL ANTEGRADE VERTEBRAL ARTERIES    PENDING FINAL REPORT

## 2019-09-01 NOTE — CONSULTS
tongue  Neck-symmetric, no masses noted, no jugular vein distension  Respiration- chest wall appears symmetric, good expansion,   normal effort without use of accessory muscles  Musculoskeletal - no significant wasting of muscles noted, no bony deformities  Extremities-no clubbing, cyanosis or edema  Skin - warm, dry, and intact. No rash, erythema, or pallor. Psychiatric - mood, affect, and behavior appear normal.   Neurological exam  Awake, alert, fluent oriented x 3 appropriate affect  Attention and concentration appear appropriate  Note memory intact. Short-term memory 2/4 at 5 minutes  Speech normal without dysarthria  No clear issues with language of fund of knowledge  Cranial Nerve Exam   CN II- Visual fields grossly unremarkable  CN III, IV,VI-EOMI, No nystagmus, conjugate eye movements, no ptosis  CN V-sensation intact to LT over face  CN VII-no facial assymetry  CN VIII-Hearing intact to voice  CN IX and X- Palate elevates in midline  CN XI-good shoulder shrug  CN XII-Tongue midline with no fasciculations or fibrillations  Motor Exam  Right-sided strength is normal.  He has 4/5 weakness left upper extremity with significant ataxia there. Left leg 4+/5. Reflexes   2+ biceps bilaterally  2+ brachioradialis  2+patella  2+ ankle jerks  No clonus ankles  No Green's sign bilateral hands  Tremors- no tremors in hands or head noted  Gait  Not tested  Coordination  Significant ataxia left upper extremity  ? ?  CBC:   Recent Labs     08/31/19 2124 09/01/19 0243   WBC 17.1* 11.4*   HGB 13.5* 12.9*    215     BMP:   Recent Labs     08/31/19 2124      K 4.1      CO2 23   BUN 25*   CREATININE 1.0   GLUCOSE 108     Hepatic:   Recent Labs     08/31/19 2124   AST 33   ALT 13   BILITOT 0.9   ALKPHOS 40     Lipids:   Recent Labs     09/01/19 0243   CHOL 164   HDL 68     INR:   Recent Labs     08/31/19 2124   INR 1.13     ?  ? Assessment and Plan   1.   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 CTA unremarkable. To go for MRI today. Agree with aspirin and risk factor modification for now. D/OT/SLP. Suggest IRF next week. ?2.  Poor short-term memory. Suspect mild dementia. B12 level ordered. Consider Aricept and/or Namenda as an outpatient.   ?      Yovana Guzman MD  Board Certified in Neurology

## 2019-09-01 NOTE — PROGRESS NOTES
MD Chasity   325 mg at 09/01/19 1348    Or    aspirin suppository 300 mg  300 mg Rectal Daily Margarito Byrne MD           Past Medical History:  Past Medical History:   Diagnosis Date    Allergic rhinitis     BPH (benign prostatic hyperplasia)     Cholelithiases     Colon cancer (HCC)     Colon polyp     GERD (gastroesophageal reflux disease)     Hearing loss     Hyperlipidemia     Hypertension     Spastic colon        Past Surgical History:  Past Surgical History:   Procedure Laterality Date    APPENDECTOMY      CHOLECYSTECTOMY, LAPAROSCOPIC  2/24/12    COLON SURGERY      colectomy    RECTAL SURGERY      fissure repair x2       Family History  Family History   Problem Relation Age of Onset    Heart Disease Mother     Cancer Father         liver       Social History  Social History     Socioeconomic History    Marital status:       Spouse name: Not on file    Number of children: Not on file    Years of education: Not on file    Highest education level: Not on file   Occupational History    Not on file   Social Needs    Financial resource strain: Not on file    Food insecurity:     Worry: Not on file     Inability: Not on file    Transportation needs:     Medical: Not on file     Non-medical: Not on file   Tobacco Use    Smoking status: Never Smoker    Smokeless tobacco: Never Used   Substance and Sexual Activity    Alcohol use: Yes     Comment: 4 per week    Drug use: Never    Sexual activity: Not on file   Lifestyle    Physical activity:     Days per week: Not on file     Minutes per session: Not on file    Stress: Not on file   Relationships    Social connections:     Talks on phone: Not on file     Gets together: Not on file     Attends [employer] service: Not on file     Active member of club or organization: Not on file     Attends meetings of clubs or organizations: Not on file     Relationship status: Not on file    Intimate partner violence:     Fear of current or ex partner: Not on file     Emotionally abused: Not on file     Physically abused: Not on file     Forced sexual activity: Not on file   Other Topics Concern    Not on file   Social History Narrative    Not on file         Review of Systems:    Review of Systems   Constitutional: Negative for activity change and fever. Respiratory: Negative for shortness of breath. Cardiovascular: Negative for chest pain and leg swelling. Gastrointestinal: Negative for constipation, diarrhea, nausea and vomiting. Genitourinary: Negative for difficulty urinating and dysuria. Musculoskeletal: Negative for back pain and neck pain. Neurological: Positive for weakness and numbness. Negative for dizziness and light-headedness. Objective:  Blood pressure (!) 145/81, pulse 65, temperature 98 °F (36.7 °C), temperature source Temporal, resp. rate 16, height 5' 9\" (1.753 m), weight 200 lb (90.7 kg), SpO2 95 %. Intake/Output Summary (Last 24 hours) at 9/1/2019 1349  Last data filed at 9/1/2019 0850  Gross per 24 hour   Intake 240 ml   Output 550 ml   Net -310 ml       Physical Exam   Constitutional: He is oriented to person, place, and time. He appears well-developed and well-nourished. HENT:   Head: Normocephalic and atraumatic. Mouth/Throat: Oropharynx is clear and moist.   Eyes: Pupils are equal, round, and reactive to light. Conjunctivae are normal.   Cardiovascular: Normal rate, regular rhythm and normal heart sounds. Pulmonary/Chest: Effort normal and breath sounds normal.   Neurological: He is alert and oriented to person, place, and time. Skin: Skin is warm and dry. Vitals reviewed.       Labs:  BMP:   Recent Labs     08/31/19 2124      K 4.1      CO2 23   BUN 25*   CREATININE 1.0   CALCIUM 9.5     CBC:   Recent Labs     08/31/19 2124 09/01/19  0243   WBC 17.1* 11.4*   HGB 13.5* 12.9*   HCT 40.2* 39.1*   MCV 93.7 95.4*    215     LIVER PROFILE:   Recent Labs     08/31/19 2124 of focal stenosis or plaquing. There is a persistent fetal origin of the left posterior cerebral artery. The distal internal carotid arteries are normal in caliber. No evidence of focal stenosis or plaquing. The anterior and middle cerebral arteries are normal in caliber with no evidence of focal steno-occlusive disease or discrete berry aneurysm. The dural venous sinuses demonstrate normal enhancement with no evidence of dural venous sinus thrombosis. No foci of abnormal contrast enhancement are demonstrated. 1.. Persistent fetal origin of the left posterior cerebral artery. This is a normal variant. 2. Otherwise unremarkable CTA of the [place]. Signed by Dr Jonny Soliz on 9/1/2019 7:34 AM    Xr Elbow Left (min 3 Views)    Result Date: 9/1/2019  EXAMINATION: Left elbow 3 views 8/31/2019 HISTORY: Fall with bruising. FINDINGS: Three-view exam of the left elbow demonstrates no evidence of acute displaced fracture. There is no localized soft tissue swelling. No evidence of hemarthrosis. . No evidence of acute fracture. Signed by Dr Jonny Soliz on 9/1/2019 7:50 AM    Xr Knee Left (1-2 Views)    Result Date: 8/31/2019  XR KNEE LEFT (1-2 VIEWS) 8/31/2019 10:04 PM History: Fall injuries. Knee pain. Left knee, 2 views. The distal femur and the proximal tibia and fibula are intact. Normal tibiofemoral and patellofemoral joint spaces. No significant joint effusion. 1. No acute bony abnormality. Signed by Dr Fernando Sigala on 8/31/2019 10:04 PM    Ct Head Wo Contrast    Result Date: 8/31/2019  CT HEAD WO CONTRAST 8/31/2019 9:55 PM History: Stroke symptoms. Resting to one side. Mental status change. Noncontrast head CT with no comparison. In order to have a CT radiation dose as low as reasonably achievable Automated Exposure Control was utilized for adjustment of the mA and/or KV according to patient size. DLP in mGycm= 692. Atrophy and small vessel disease. No intracranial hemorrhage.  No cortical

## 2019-09-01 NOTE — ED PROVIDER NOTES
[facility]  eMERGENCY dEPARTMENT eNCOUnter      Pt Name: Jeremiah Munoz  MRN: [id number]  [place] 1935  Date of evaluation: 8/31/2019  Provider: Lauren Zheng MD    CHIEF COMPLAINT       Chief Complaint   Patient presents with    Neurologic Problem         HISTORY OF PRESENT ILLNESS   (Location/Symptom, Timing/Onset,Context/Setting, Quality, Duration, Modifying Factors, Severity)  Note limiting factors. Jeremiah Munoz is a 81 y.o. male who presents to the emergency department due to concern for CVA. Patient was last seen normal by his girlfriend 2 days ago. She said that he was supposed to come and pick her up today and when he did not she went to his house to check on him. Said that he was not able to dress himself and fallen several times. Patient is a very pleasant but vague historian. Cannot tell me when symptoms began. Indicates he thinks they started yesterday but cannot give me definitive timeline. The last time that I know for sure he was normal was 2 days ago when his girlfriend saw him last. Patient has multiple bruises to his arms legs and torso from his multiple falls. Complains of some mild right hip and left knee pain. He says pain is very minimal.  Denies neck or back pain. He denies weakness. No fevers chills or other constitutional symptoms. No vision changes. No headache. Denies any pain of any kind. No chest pain or trouble breathing. No urinary symptoms. HPI    NursingNotes were reviewed. REVIEW OF SYSTEMS    (2-9 systems for level 4, 10 or more for level 5)     Review of Systems   Constitutional: Negative for fever. Eyes: Negative for pain and visual disturbance. Respiratory: Negative for shortness of breath. Cardiovascular: Negative for chest pain and palpitations. Gastrointestinal: Negative for abdominal pain, diarrhea and vomiting. Genitourinary: Negative for dysuria. Musculoskeletal: Positive for gait problem.  Negative for back nontoxic and afebrile on exam here. Suspect that his elevated white count might be secondary to trauma as he has fallen multiple times over the past 24 hours. Has several bruises but really does not have any significant tenderness anywhere. CK slightly elevated and will need to be trended. I think this is probably secondary to patient's multiple falls. Case discussed with hospitalist was agreeable plan of care and admission. Plan will be to consult neurology in the morning. Hospitalist asked that I order a dose of aspirin. Will give via suppository because he has not yet had a dysphagia evaluation to make sure he can tolerate oral intake. Patient updated about plan. CONSULTS:  None    PROCEDURES:  Unless otherwise notedbelow, none     Procedures    FINAL IMPRESSION     1. Multiple falls    2. Multiple bruises    3. Confusion    4.  Cerebrovascular accident (CVA), unspecified mechanism ([address]    5. Leukocytosis, unspecified type          DISPOSITION/PLAN   DISPOSITION        PATIENT REFERRED TO:  @FUP@    DISCHARGE MEDICATIONS:  New Prescriptions    No medications on file          (Please note that portions of this note were completed with a voice recognition program.  Efforts were made to edit the dictations butoccasionally words are mis-transcribed.)    Benita Jeffries MD (electronically signed)  AttendingEmergency Physician        Benita Jeffries MD  09/01/19 0001       Benita Jeffries MD  09/01/19 0002

## 2019-09-01 NOTE — H&P
[facility] Medicine  History and Physical    Patient:  Jeannette Sanders  MRN: [id number]    CHIEF COMPLAINT:  No complaints, wants to go home    History Obtained From: Patient  Family present for interview:     PCP: Rajwinder Gomez MD    HISTORY OF PRESENT ILLNESS:  81 y.o. male who presents with difficulty walking, confusion, inability to get dressed. When he did not show up for supper with his female friend she went to his house to see if he is okay. The patient was struggling to button his shirt and put his pants on. He was confused. He was leaning to the left one walking and bumping into the walls. She called EMS and he was brought to the emergency department  via ambulance. The patient denies any difficulty, denies falling, states he wants to go home. Patient has significantly impaired memory. He denies pain. Female friend present in the emergency department states he has had 2 years of progressively worsening memory problems. On physical exam he is unable to do finger-to-nose cerebellum testing. He is having difficulty following instructions. He is hard of hearing he did not bring his hearing aids. Routine laboratory studies show he is dehydrated with positive urine ketones, elevated BUN to creatinine ratio (25/1). Urine toxicology screen was  negative    Patient is admitted to the medicine floor for further evaluation treatment of stroke. REVIEW OF SYSTEMS:  Review of Systems    All other 14 review of systems negative except as noted above.     Past Medical History:      Diagnosis Date    Allergic rhinitis     BPH (benign prostatic hyperplasia)     Cholelithiases     Colon cancer (HCC)     Colon polyp     GERD (gastroesophageal reflux disease)     Hearing loss     Hyperlipidemia     Hypertension     Spastic colon        Past Surgical History:      Procedure Laterality Date    APPENDECTOMY      CHOLECYSTECTOMY, LAPAROSCOPIC  2/24/12    COLON SURGERY 19   INR 1.13     A1c:Invalid input(s): HEMOGLOBIN A1C    SEPSIS Criteria:   Temp: Temp  Av.9 °F (36.6 °C)  Min: 97.6 °F (36.4 °C)  Max: 98.1 °F (36.7 °C)    HR Range: Pulse  Av.5  Min: 75  Max: 86   RR Range: Resp  Av  Min: 16  Max: 19   WBC:   Recent Labs     19  0243   WBC 11.4*      Lactic acid: No results for input(s): LACTA in the last 72 hours. Creatinine:   Recent Labs     19   CREATININE 1.0      Troponin:   Recent Labs     19   TROPONINI <0.01       LFTs:   Recent Labs     19   AST 33   ALT 13   BILITOT 0.9   ALKPHOS 40       ABGs: No results for input(s): PHART, NAP9MME, PO2ART, EXF5LLY, BEART, HGBAE, X7NZCBVG, CARBOXHGBART, 02THERAPY in the last 72 hours. Urine drug screen:   Recent Labs     19   BARBSCNU Negative   LABBENZ Negative       Cultures:   No results for input(s): BC in the last 72 hours. No results for input(s): Larry Crome in the last 72 hours. No results for input(s): CULTRESP in the last 72 hours. No results for input(s): LABURIN in the last 72 hours. -----------------------------------------------------------------    Imaging Studies:    XR CHEST PORTABLE   Final Result   1. Chronic lung changes. Signed by Dr Denzel Samuels on 2019 10:02 PM      XR HIP 2-3 VW W PELVIS RIGHT   Final Result   1. No acute bony abnormality is seen. Signed by Dr Denzel Samuels on 2019 10:03 PM      XR KNEE LEFT (1-2 VIEWS)   Final Result   1. No acute bony abnormality. Signed by Dr Denzel Samuels on 2019 10:04 PM      CT Cervical Spine WO Contrast   Final Result   1. Extensive degenerative change. 2. No acute fracture. Signed by Dr Denzel Samuels on 2019 10:00 PM      CT Head WO Contrast   Final Result   1. Atrophy and small vessel disease. 2. No acute intracranial abnormality is seen.    Signed by Dr Denzel Samuels on 2019 9:56 PM      CTA HEAD W WO CONTRAST    (Results Pending)   CTA NECK W CONTRAST    (Results Pending)   XR ELBOW LEFT (MIN 3 VIEWS)    (Results Pending)   Vascular carotid duplex bilateral    (Results Pending)   MRI brain without contrast    (Results Pending)         Assessment     Principal Problem:    Acute CVA (cerebrovascular accident) ([address]  Active Problems:    Ataxia involving legs    Memory deficit    Hard of hearing    Dementia  Resolved Problems:    * No resolved hospital problems. *      Plan     --Patient admitted to the medicine floor for stroke work-up. MRI of the brain without contrast, carotid Doppler ultrasound and echocardiogram ordered. Aspirin 324 mg p.o. daily atorvastatin 80 mg p.o. daily  ordered. Neurology consultation requested. --Patient appears to have moderate dementia. He is a poor historian and denies any neurologic deficits although he was unable to dress himself or button his shirt at home as witnessed by his female  present for the interview. --Patient is nearly deaf and did not bring his hearing aids to the hospital.    -- DVT prophylaxis: SQ Lovenox      -- CODE STATUS: Full Code    Total face-to-face time with this patient, time spent reviewing medical records and in coordination of care with the emergency department physician, nursing staff was 75 minutes.     Signed:  Linh Reveles MD 9/1/2019 6:27 AM  Admitting Hospitalist

## 2019-09-02 PROBLEM — E53.8 B12 DEFICIENCY: Status: ACTIVE | Noted: 2019-09-02

## 2019-09-02 PROCEDURE — 6370000000 HC RX 637 (ALT 250 FOR IP): Performed by: PSYCHIATRY & NEUROLOGY

## 2019-09-02 PROCEDURE — G8996 SWALLOW CURRENT STATUS: HCPCS

## 2019-09-02 PROCEDURE — 97165 OT EVAL LOW COMPLEX 30 MIN: CPT

## 2019-09-02 PROCEDURE — 1210000000 HC MED SURG R&B

## 2019-09-02 PROCEDURE — 99233 SBSQ HOSP IP/OBS HIGH 50: CPT | Performed by: PSYCHIATRY & NEUROLOGY

## 2019-09-02 PROCEDURE — G8997 SWALLOW GOAL STATUS: HCPCS

## 2019-09-02 PROCEDURE — 97530 THERAPEUTIC ACTIVITIES: CPT

## 2019-09-02 PROCEDURE — 96116 NUBHVL XM PHYS/QHP 1ST HR: CPT

## 2019-09-02 PROCEDURE — 6360000002 HC RX W HCPCS: Performed by: HOSPITALIST

## 2019-09-02 PROCEDURE — 97116 GAIT TRAINING THERAPY: CPT

## 2019-09-02 PROCEDURE — 6360000002 HC RX W HCPCS: Performed by: PSYCHIATRY & NEUROLOGY

## 2019-09-02 PROCEDURE — 97110 THERAPEUTIC EXERCISES: CPT

## 2019-09-02 PROCEDURE — 6370000000 HC RX 637 (ALT 250 FOR IP): Performed by: HOSPITALIST

## 2019-09-02 PROCEDURE — 92610 EVALUATE SWALLOWING FUNCTION: CPT

## 2019-09-02 RX ORDER — SILDENAFIL CITRATE 20 MG/1
20 TABLET ORAL PRN
Status: ON HOLD | COMMUNITY
End: 2021-04-06 | Stop reason: HOSPADM

## 2019-09-02 RX ORDER — METOPROLOL SUCCINATE 25 MG/1
25 TABLET, EXTENDED RELEASE ORAL 2 TIMES DAILY
COMMUNITY

## 2019-09-02 RX ORDER — CITALOPRAM 20 MG/1
20 TABLET ORAL DAILY
Status: ON HOLD | COMMUNITY
End: 2019-09-05 | Stop reason: HOSPADM

## 2019-09-02 RX ORDER — ACETAMINOPHEN 325 MG/1
650 TABLET ORAL EVERY 6 HOURS PRN
Status: DISCONTINUED | OUTPATIENT
Start: 2019-09-02 | End: 2019-09-05 | Stop reason: HOSPADM

## 2019-09-02 RX ORDER — OMEPRAZOLE 20 MG/1
40 CAPSULE, DELAYED RELEASE ORAL DAILY
Status: ON HOLD | COMMUNITY
End: 2021-04-06 | Stop reason: HOSPADM

## 2019-09-02 RX ORDER — CYANOCOBALAMIN 1000 UG/ML
1000 INJECTION INTRAMUSCULAR; SUBCUTANEOUS DAILY
Status: COMPLETED | OUTPATIENT
Start: 2019-09-02 | End: 2019-09-04

## 2019-09-02 RX ORDER — ESCITALOPRAM OXALATE 10 MG/1
10 TABLET ORAL DAILY
Status: DISCONTINUED | OUTPATIENT
Start: 2019-09-02 | End: 2019-09-05 | Stop reason: HOSPADM

## 2019-09-02 RX ADMIN — ASPIRIN 325 MG: 325 TABLET, DELAYED RELEASE ORAL at 09:51

## 2019-09-02 RX ADMIN — ESCITALOPRAM OXALATE 10 MG: 10 TABLET ORAL at 13:43

## 2019-09-02 RX ADMIN — CYANOCOBALAMIN 1000 MCG: 1000 INJECTION, SOLUTION INTRAMUSCULAR; SUBCUTANEOUS at 09:51

## 2019-09-02 RX ADMIN — ENOXAPARIN SODIUM 40 MG: 40 INJECTION SUBCUTANEOUS at 09:51

## 2019-09-02 RX ADMIN — ACETAMINOPHEN 650 MG: 325 TABLET ORAL at 14:35

## 2019-09-02 ASSESSMENT — PAIN DESCRIPTION - PAIN TYPE: TYPE: CHRONIC PAIN

## 2019-09-02 ASSESSMENT — PAIN DESCRIPTION - DESCRIPTORS: DESCRIPTORS: ACHING;OTHER (COMMENT)

## 2019-09-02 ASSESSMENT — PAIN SCALES - GENERAL
PAINLEVEL_OUTOF10: 0
PAINLEVEL_OUTOF10: 0

## 2019-09-02 ASSESSMENT — PAIN DESCRIPTION - LOCATION: LOCATION: BACK

## 2019-09-02 NOTE — PROGRESS NOTES
09/02/19 0955   Restrictions/Precautions   Restrictions/Precautions Fall Risk   General   Chart Reviewed Yes   Subjective   Subjective Patient up in chair agrees to therapy   Pain Screening   Patient Currently in Pain No   Vital Signs   Level of Consciousness 0   Oxygen Therapy   O2 Device None (Room air)   Transfers   Sit to Stand Contact guard assistance   Stand to sit Minimal Assistance   Ambulation   Ambulation?  Yes   Ambulation 1   Surface level tile   Device Rolling Walker   Assistance Minimal assistance   Quality of Gait Ataxic   Distance 150'   Comments Patient in chair post gait   Balance   Standing - Dynamic Fair;+   Exercises   Hip Flexion 10   Hip Abduction 10   Knee Long Arc Quad 10   Knee Active Range of Motion YES   Ankle Pumps 10   Activity Tolerance   Activity Tolerance Patient Tolerated treatment well   Safety Devices   Type of devices Left in chair;Chair alarm in place;Call light within reach   Physical Therapy    Electronically signed by Dennis Hall PTA on 9/2/2019 at 10:03 AM

## 2019-09-02 NOTE — PROGRESS NOTES
Comprehension  Comprehension: Within Functional Limits         Expression  Primary Mode of Expression: Verbal    Verbal Expression  Verbal Expression: Within functional limits    Written Expression  Written Expression: Unable to assess    Motor Speech  Motor Speech: Within Functional Limits    Pragmatics/Social Functioning  Pragmatics: Within functional limits    Cognition:      Orientation  Overall Orientation Status: Within Functional Limits  Memory  Memory: Exceptions to [facility]  Daily Routines: Mild  Short-term Memory: Moderate  Working Memory: Mild  Problem Solving  Problem Solving: Exceptions to [facility]  Complex Functional Tasks: Mild  Managing Finances: To be assessed in therapy  Managing Medications: To be assessed in therapy  Verbal Reasoning Skills: Mild  Numeric Reasoning  Numeric Reasoning: Exceptions to [facility]   Calculations: Mild  Abstract Reasoning  Abstract Reasoning: Within Functional Limits  Safety/Judgement  Safety/Judgement: Exceptions to [facility]  Complex Functional Tasks: Mild  Insight: Mild    Impulsive: Mild  Flexibility of Thought: Mild      Prognosis:  Speech Therapy Prognosis  Prognosis: Fair  Individuals consulted  Consulted and agree with results and recommendations: Patient; Family member  Family member consulted: daughter    Education:  Patient Education Response: Verbalizes understanding          G-Code:  SLP G-Codes  Functional Assessment Tool Used: BSE  Functional Limitations: Swallowing  Swallow Current Status (): 0 percent impaired, limited or restricted  Swallow Goal Status (): 0 percent impaired, limited or restricted         Therapy Time:   Individual Concurrent Group Co-treatment   Time In          Time Out          Minutes                   VANESSA Galeano  9/2/2019 9:19 AM

## 2019-09-02 NOTE — PROGRESS NOTES
symmetrical, trachea midline   Lungs: clear to auscultation bilaterally,no rales or wheezes   Heart: regular rate and rhythm, S1, S2 normal, no murmur  Abdomen:soft, non-tender; non-distended, normal bowel sounds no masses, no organomegaly  Extremities:No lower extremity edema,  No erythema, no tenderness to palpation  Skin: Skin color, texture, turgor normal. No rashes or lesions  Lymphatic: No palpable lymph node enlargment  Neurologic: Alert and oriented X 3, LUE ataxia, fluent speech, follows commands with some degree of difficulty when using LUE  Psychiatric: Alert and oriented, thought content appropriate, normal insight, mood appropriate    Medications:      cyanocobalamin  1,000 mcg Intramuscular Daily    escitalopram  10 mg Oral Daily    sodium chloride flush  10 mL Intravenous 2 times per day    enoxaparin  40 mg Subcutaneous Daily    atorvastatin  80 mg Oral Nightly    aspirin  325 mg Oral Daily    Or    aspirin  300 mg Rectal Daily     sodium chloride flush, magnesium hydroxide, ondansetron, ondansetron  DIET GENERAL;     Lab and other Data:     Recent Labs     08/31/19 2124 09/01/19 0243   WBC 17.1* 11.4*   HGB 13.5* 12.9*    215     Recent Labs     08/31/19 2124      K 4.1      CO2 23   BUN 25*   CREATININE 1.0   GLUCOSE 108     Recent Labs     08/31/19 2124   AST 33   ALT 13   BILITOT 0.9   ALKPHOS 40     Troponin T:   Recent Labs     08/31/19 2124   TROPONINI <0.01     INR:   Recent Labs     08/31/19 2124   INR 1.13     UA:  Recent Labs     08/31/19  2321   COLORU YELLOW   PHUR 6.0   WBCUA 1   RBCUA 1   BACTERIA NEGATIVE   CLARITYU Clear   SPECGRAV 1.035   LEUKOCYTESUR Negative   UROBILINOGEN 0.2   BILIRUBINUR Negative   BLOODU SMALL*   GLUCOSEU Negative     A1C:   Recent Labs     09/01/19  0243   LABA1C 5.5     RAD:   Cta Head W Wo Contrast  1.. Persistent fetal origin of the left posterior cerebral artery. This is a normal variant.  2. Otherwise unremarkable CTA of noted involving the periventricular and higher white matter tracts. Signed by Dr [place] on 9/1/2019 1:36 PM    Xr Hip 2-3 Vw W Pelvis Right  1. No acute bony abnormality is seen. Signed by Dr David Rudolph on 8/31/2019 10:03 PM    Echo:   Mitral valve leaflets are mildly thickened with preserved leaflet   mobility.   Mild to moderate mitral regurgitation is present.  Ivelisse Katherine thickened aortic valve leaflets with preserved leaflet mobility.   Aortic valve appears to be tricuspid.   No evidence of aortic stenosis; trivial aortic regurgitation.   Mild tricuspid regurgitation with estimated RVSP of 33 mm Hg.   Severely dilated left atrium.   Normal intact intra-atrial septum was noted with no evidence of   significant intra-atrial communications by color flow Doppler or by   agitated saline study.   Left ventricular size is normal .   Mild concentric left ventricular hypertrophy.   Left ventricular ejection fraction is estimated at 55%.   Mildly enlarged right atrium size. Assessment/Plan   Principal Problem:    Acute CVA (cerebrovascular accident) [facility])  Active Problems:    Ataxia involving legs    Memory deficit    Hard of hearing    Dementia    B12 deficiency  Resolved Problems:    * No resolved hospital problems. *    Permissive hypertension, continue Aspirin, Statin. Social work consulted for assistance. Patient and daughter wanting to go to inpatient rehab in [place] or [facility] acute rehab. B12 replacement ordered per Dr. Eva Feliciano. Will repeat labs in AM as his white count was mildly elevated. Otherwise patient medically stable for discharge once rehab placement arranged.      DVT Prophylaxis: Jeanna Loaiza PA-C

## 2019-09-02 NOTE — PROGRESS NOTES
Assistance: Independent  Transfer Assistance: Independent  Active : Yes       Objective   Vision: Within Functional Limits  Hearing: Exceptions to [facility]  Hearing Exceptions: Hard of hearing/hearing concerns    Orientation  Overall Orientation Status: Within Normal Limits        ADL  Feeding: Setup  Grooming: Supervision  UE Bathing: Supervision  LE Bathing: Minimal assistance  UE Dressing: Minimal assistance  LE Dressing: Moderate assistance  Toileting: Moderate assistance  Coordination  Movements Are Fluid And Coordinated: No  Coordination and Movement description: Fine motor impairments; Left UE; Ataxia        Transfers  Stand Step Transfers: Minimal assistance  Sit to stand: Minimal assistance  Transfer Comments: Using hand held on RUE like a cane. May try rw     Cognition  Overall Cognitive Status: Exceptions  Arousal/Alertness: Appropriate responses to stimuli  Following Commands: Follows multistep commands with repitition  Attention Span: Appears intact  Memory: Decreased recall of recent events  Safety Judgement: Decreased awareness of need for safety;Decreased awareness of need for assistance  Insights: Decreased awareness of deficits                 LUE AROM (degrees)  LUE AROM : WFL  RUE AROM (degrees)  RUE AROM : WFL  LUE Strength  Gross LUE Strength: WFL  RUE Strength  Gross RUE Strength: WNL                   Plan   Plan  Times per week: 3-5x/week    G-Code     OutComes Score                                                  AM-PAC Score             Goals  Short term goals  Time Frame for Short term goals: 1 week  Short term goal 1: Supervision with donning pullover shirts  Short term goal 2: Supervision with seated LB dsg  Short term goal 3: CGA with clothing mgmt in standing  Short term goal 4: Able to independently grasp small object at chest level with L hand and move and release object.   Long term goals  Long term goal 1: Return to PLOF       Therapy Time   Individual Concurrent Group

## 2019-09-02 NOTE — PROGRESS NOTES
>60 08/31/2019     Lab Results   Component Value Date    INR 1.13 08/31/2019   No results found for: PHENYTOIN, ESR, CRP    MRI BRAIN WO CONTRAST 9/1/2019 12:00 AM   HISTORY: Subacute neurologic deficit   Comparison: None.     Technique: Multiplanar imaging of the brain was performed in a routine   fashion. Findings:    Midline structures are nondisplaced. The ventricles are normal in configuration. There is atrophy of the   brain with prominence of the subarachnoid spaces and ventricular   enlargement. There is no significant mass effect or hydrocephalus. Basilar cisterns are preserved. There are multiple foci of T2 abnormality involving the   periventricular and higher white matter tracts consistent with   moderate small vessel ischemic change. . No abnormal extra axial fluid   collections are noted. Diffusion imaging demonstrates a focus of restricted diffusion   involving the posterior right frontal periventricular white matter   adjacent to the body of the right lateral ventricle. There is an   associated ADC mapping abnormality consistent with an acute   nonhemorrhagic infarct. There is no associated mass effect or evidence   of shift of the midline. .    Proximal cervical spinal cord, brainstem, and cerebellum are   unremarkable. Normal cerebrovascular flow voids are seen. Bilateral   globes and orbits are normal in appearance. No abnormal signal is noted in the mastoid air cells or paranasal   sinuses.        Impression   Impression:    1. There is an acute infarct with associated restricted diffusion and   ADC mapping abnormality involving the right posterior frontal   periventricular white matter. This suggests this infarct to be less   than 10 days in age. There is no evidence of hemorrhagic conversion. 2. There is atrophy of the brain. Moderate small vessel ischemic   changes are noted involving the periventricular and higher white   matter tracts.             RECORD REVIEW: Previous medical records, medications were reviewed at today's visit    IMPRESSION:   1. 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 There is a moderate amount of old deep small vessel change. Current stroke is felt to be a small vessel event. CTA unremarkable. Agree with aspirin and risk factor modification for now. PT/OT/SLP. Suggest IRF. Daughter wants him to do it in [place]. ?2.  Poor short-term memory. Suspect mild dementia. B12 level is low at 220. IM replacement ordered. .  Consider Aricept and/or Namenda as an outpatient. ? Resume antidepressant    More than 35 minutes were spent today reviewing the patient's records, films, examination and and counseling with patient and daughter.

## 2019-09-03 LAB
ANION GAP SERPL CALCULATED.3IONS-SCNC: 11 MMOL/L (ref 7–19)
BUN BLDV-MCNC: 11 MG/DL (ref 8–23)
CALCIUM SERPL-MCNC: 8.9 MG/DL (ref 8.8–10.2)
CHLORIDE BLD-SCNC: 99 MMOL/L (ref 98–111)
CO2: 29 MMOL/L (ref 22–29)
CREAT SERPL-MCNC: 0.8 MG/DL (ref 0.5–1.2)
GFR NON-AFRICAN AMERICAN: >60
GLUCOSE BLD-MCNC: 105 MG/DL (ref 74–109)
HCT VFR BLD CALC: 38.3 % (ref 42–52)
HEMOGLOBIN: 12.7 G/DL (ref 14–18)
MCH RBC QN AUTO: 31.5 PG (ref 27–31)
MCHC RBC AUTO-ENTMCNC: 33.2 G/DL (ref 33–37)
MCV RBC AUTO: 95 FL (ref 80–94)
PDW BLD-RTO: 12.5 % (ref 11.5–14.5)
PLATELET # BLD: 212 K/UL (ref 130–400)
PMV BLD AUTO: 11.2 FL (ref 9.4–12.4)
POTASSIUM SERPL-SCNC: 3.5 MMOL/L (ref 3.5–5)
RBC # BLD: 4.03 M/UL (ref 4.7–6.1)
SODIUM BLD-SCNC: 139 MMOL/L (ref 136–145)
WBC # BLD: 6.6 K/UL (ref 4.8–10.8)

## 2019-09-03 PROCEDURE — 6360000002 HC RX W HCPCS: Performed by: PSYCHIATRY & NEUROLOGY

## 2019-09-03 PROCEDURE — 6370000000 HC RX 637 (ALT 250 FOR IP): Performed by: PHYSICIAN ASSISTANT

## 2019-09-03 PROCEDURE — 6370000000 HC RX 637 (ALT 250 FOR IP): Performed by: HOSPITALIST

## 2019-09-03 PROCEDURE — 36415 COLL VENOUS BLD VENIPUNCTURE: CPT

## 2019-09-03 PROCEDURE — 6370000000 HC RX 637 (ALT 250 FOR IP): Performed by: PSYCHIATRY & NEUROLOGY

## 2019-09-03 PROCEDURE — 97116 GAIT TRAINING THERAPY: CPT

## 2019-09-03 PROCEDURE — 99232 SBSQ HOSP IP/OBS MODERATE 35: CPT | Performed by: PSYCHIATRY & NEUROLOGY

## 2019-09-03 PROCEDURE — 85027 COMPLETE CBC AUTOMATED: CPT

## 2019-09-03 PROCEDURE — 97110 THERAPEUTIC EXERCISES: CPT

## 2019-09-03 PROCEDURE — 80048 BASIC METABOLIC PNL TOTAL CA: CPT

## 2019-09-03 PROCEDURE — 1210000000 HC MED SURG R&B

## 2019-09-03 PROCEDURE — 2580000003 HC RX 258: Performed by: HOSPITALIST

## 2019-09-03 PROCEDURE — 97530 THERAPEUTIC ACTIVITIES: CPT

## 2019-09-03 PROCEDURE — 6360000002 HC RX W HCPCS: Performed by: HOSPITALIST

## 2019-09-03 RX ORDER — QUETIAPINE FUMARATE 25 MG/1
25 TABLET, FILM COATED ORAL NIGHTLY PRN
Status: DISCONTINUED | OUTPATIENT
Start: 2019-09-03 | End: 2019-09-05 | Stop reason: HOSPADM

## 2019-09-03 RX ORDER — CHOLESTYRAMINE LIGHT 4 G/5.7G
4 POWDER, FOR SUSPENSION ORAL DAILY
Status: DISCONTINUED | OUTPATIENT
Start: 2019-09-03 | End: 2019-09-05 | Stop reason: HOSPADM

## 2019-09-03 RX ORDER — PANTOPRAZOLE SODIUM 40 MG/1
40 TABLET, DELAYED RELEASE ORAL
Status: DISCONTINUED | OUTPATIENT
Start: 2019-09-04 | End: 2019-09-05 | Stop reason: HOSPADM

## 2019-09-03 RX ORDER — LOPERAMIDE HYDROCHLORIDE 2 MG/1
2 CAPSULE ORAL 4 TIMES DAILY PRN
Status: DISCONTINUED | OUTPATIENT
Start: 2019-09-03 | End: 2019-09-05 | Stop reason: HOSPADM

## 2019-09-03 RX ORDER — METOPROLOL SUCCINATE 25 MG/1
25 TABLET, EXTENDED RELEASE ORAL DAILY
Status: DISCONTINUED | OUTPATIENT
Start: 2019-09-03 | End: 2019-09-04

## 2019-09-03 RX ADMIN — METOPROLOL SUCCINATE 25 MG: 25 TABLET, EXTENDED RELEASE ORAL at 12:28

## 2019-09-03 RX ADMIN — CYANOCOBALAMIN 1000 MCG: 1000 INJECTION, SOLUTION INTRAMUSCULAR; SUBCUTANEOUS at 07:34

## 2019-09-03 RX ADMIN — ATORVASTATIN CALCIUM 80 MG: 80 TABLET, FILM COATED ORAL at 00:14

## 2019-09-03 RX ADMIN — ASPIRIN 325 MG: 325 TABLET, DELAYED RELEASE ORAL at 07:34

## 2019-09-03 RX ADMIN — ENOXAPARIN SODIUM 40 MG: 40 INJECTION SUBCUTANEOUS at 07:34

## 2019-09-03 RX ADMIN — QUETIAPINE FUMARATE 25 MG: 25 TABLET ORAL at 19:33

## 2019-09-03 RX ADMIN — Medication 10 ML: at 07:35

## 2019-09-03 RX ADMIN — ESCITALOPRAM OXALATE 10 MG: 10 TABLET ORAL at 07:34

## 2019-09-03 RX ADMIN — ATORVASTATIN CALCIUM 80 MG: 80 TABLET, FILM COATED ORAL at 19:32

## 2019-09-03 RX ADMIN — LOPERAMIDE HYDROCHLORIDE 2 MG: 2 CAPSULE ORAL at 12:07

## 2019-09-03 RX ADMIN — DEXTROMETHORPHAN HYDROBROMIDE AND QUINIDINE SULFATE 1 CAPSULE: 20; 10 CAPSULE, GELATIN COATED ORAL at 16:40

## 2019-09-03 NOTE — PROGRESS NOTES
Patient:   Jakob De  MR#:    [id number]   Room:    4020/363-20   YOB: 1935  Date of Progress Note: 9/3/2019  Time of Note                           1:22 PM  Consulting Physician:   Geo Kessler M.D. Attending Physician:  Ivelisse Cotter MD     CHIEF COMPLAINT: Recurrent falls and left-sided weakness  ? Subjective  This 81 y.o. male who presents with recurrent falls and left-sided weakness. Says his symptoms began several days ago. He woke up one morning earlier this week and notices left arm is weak and clumsy. His left leg is involved to his he has had several falls. He lives by himself as his wife passed away a few years ago. He was found by a friend no 8/31 and brought to the ED. CT scan of the head is unremarkable. He was found to have a left pronator drift. He is admitted for stroke work-up. He was outside of the window for TPA. CTA of the head and neck were relatively unremarkable. He does not take daily aspirin or antiplatelet drugs at home although occasionally takes Anacin. Had some sundowning last evening and some agitation with it. REVIEW OF SYSTEMS:  Constitutional: No fevers No chills  Neck:No stiffness  Respiratory: No shortness of breath  Cardiovascular: No chest pain No palpitations  Gastrointestinal: No abdominal pain    Genitourinary: No Dysuria  Neurological: No headache, no confusion      PHYSICAL EXAM:  BP (!) 162/90   Pulse 70   Temp 97.4 °F (36.3 °C)   Resp 17   Ht 5' 9\" (1.753 m)   Wt 200 lb (90.7 kg)   SpO2 95%   BMI 29.53 kg/m²     Constitutional: he appears well-developed and well-nourished. Eyes - conjunctiva normal.  Pupils react to light  Ear, nose, throat -hearing intact to voice.  No scars, masses, or lesions over external nose or ears, no atrophy of tongue  Neck-symmetric, no masses noted, no jugular vein distension  Respiration- chest wall appears symmetric, good expansion,   normal effort without use of accessory muscles  Cardiovascular- RRR  Musculoskeletal - no significant wasting of muscles noted, no bony deformities, gait no gross ataxia  Extremities-no clubbing, cyanosis or edema  Skin - warm, dry, and intact. No rash, erythema, or pallor. Psychiatric - mood, affect, and behavior appear normal.      Neurology  NEUROLOGICAL EXAM:  Awake, alert, fluent oriented x 3 appropriate affect  Attention and concentration appear appropriate  Note memory intact. Short-term memory 2/4 at 5 minutes  Speech normal without dysarthria  No clear issues with language of fund of knowledge  Cranial Nerve Exam   CN II- Visual fields grossly unremarkable  CN III, IV,VI-EOMI, No nystagmus, conjugate eye movements, no ptosis  CN VII-no facial assymetry  CN VIII-Hearing intact to voice  CN IX and X- Palate elevates in midline  CN XI-good shoulder shrug  CN XII-Tongue midline with no fasciculations or fibrillations  Motor Exam  Right-sided strength is normal.  He has 4/5 weakness left upper extremity with significant ataxia there. Left leg 4+/5.     Reflexes   2+ biceps bilaterally  2+ brachioradialis  2+patella  2+ ankle jerks  No clonus ankles  No Green's sign bilateral hands  Tremors- no tremors in hands or head noted  Gait  Not tested  Coordination  Significant ataxia left upper extremity  ? Nursing/pcp notes, imaging,labs and vitals reviewed.      PT,OT and/or speech notes reviewed    Lab Results   Component Value Date    WBC 6.6 09/03/2019    HGB 12.7 (L) 09/03/2019    HCT 38.3 (L) 09/03/2019    MCV 95.0 (H) 09/03/2019     09/03/2019     Lab Results   Component Value Date     09/03/2019    K 3.5 09/03/2019    CL 99 09/03/2019    CO2 29 09/03/2019    BUN 11 09/03/2019    CREATININE 0.8 09/03/2019    GLUCOSE 105 09/03/2019    CALCIUM 8.9 09/03/2019    PROT 6.8 08/31/2019    LABALBU 4.4 08/31/2019    BILITOT 0.9 08/31/2019    ALKPHOS 40 08/31/2019    AST 33 08/31/2019    ALT 13 08/31/2019    LABGLOM >60 09/03/2019     Lab Results   Component Value Date    INR 1.13 08/31/2019   No results found for: PHENYTOIN, ESR, CRP    MRI BRAIN WO CONTRAST 9/1/2019 12:00 AM   HISTORY: Subacute neurologic deficit   Comparison: None.     Technique: Multiplanar imaging of the brain was performed in a routine   fashion. Findings:    Midline structures are nondisplaced. The ventricles are normal in configuration. There is atrophy of the   brain with prominence of the subarachnoid spaces and ventricular   enlargement. There is no significant mass effect or hydrocephalus. Basilar cisterns are preserved. There are multiple foci of T2 abnormality involving the   periventricular and higher white matter tracts consistent with   moderate small vessel ischemic change. . No abnormal extra axial fluid   collections are noted. Diffusion imaging demonstrates a focus of restricted diffusion   involving the posterior right frontal periventricular white matter   adjacent to the body of the right lateral ventricle. There is an   associated ADC mapping abnormality consistent with an acute   nonhemorrhagic infarct. There is no associated mass effect or evidence   of shift of the midline. .    Proximal cervical spinal cord, brainstem, and cerebellum are   unremarkable. Normal cerebrovascular flow voids are seen. Bilateral   globes and orbits are normal in appearance. No abnormal signal is noted in the mastoid air cells or paranasal   sinuses.        Impression   Impression:    1. There is an acute infarct with associated restricted diffusion and   ADC mapping abnormality involving the right posterior frontal   periventricular white matter. This suggests this infarct to be less   than 10 days in age. There is no evidence of hemorrhagic conversion. 2. There is atrophy of the brain. Moderate small vessel ischemic   changes are noted involving the periventricular and higher white   matter tracts.             RECORD REVIEW: Previous medical records, medications were reviewed at

## 2019-09-03 NOTE — PROGRESS NOTES
[facility]    Patient:  Carrie Manley  YOB: 1935  Date of Service: 9/3/2019  MRN: [id number]   Acct: [de-identified]   Primary Care Physician: Mylene Lopez MD  Advance Directive: Full Code  Admit Date: 8/31/2019       Hospital Day: 3    CHIEF COMPLAINT Acute CVA    SUBJECTIVE:  Mr. Grecia Melara has no new complaints this morning. Tells me he would prefer to go home but understands why rehab would be beneficial. States he's concerned he may need something to prevent diarrhea as he has loose bowel movements frequently at home. Tearful this morning stating he realizes he shouldn't live home alone anymore. Cumulative Hospital Course:   Mr. Grecia Melara is an 81year old with PMH hearing loss, HTN, BPH who presented with confusion, difficulty walking, inability to get dressed. Significant other called EMS when he didn't show up at her house for dinner and she stated Mr. Grecia Melara has significantly impaired memory. He was admitted to [facility] for acute CVA outside of window for TPA and stroke work up. Neurology was consulted and recommended CTA, MRI. CTA was unremarkable. 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 Aspirin and risk factor modification recommended per Neurology as well as inpatient rehab. Review of Systems:   14 point review of systems is negative except as specifically addressed above. Objective:   VITALS:  BP (!) 155/87   Pulse 71   Temp 97.7 °F (36.5 °C) (Temporal)   Resp 17   Ht 5' 9\" (1.753 m)   Wt 200 lb (90.7 kg)   SpO2 95%   BMI 29.53 kg/m²   24HR INTAKE/OUTPUT:      Intake/Output Summary (Last 24 hours) at 9/3/2019 1954  Last data filed at 9/3/2019 0740  Gross per 24 hour   Intake 655 ml   Output 300 ml   Net 355 ml     General appearance: alert, appears stated age, cooperative and no distress, resting comfortably in bed, [place]  Head: Normocephalic, without obvious abnormality, atraumatic  Eyes: conjunctivae/corneas clear. PERRL, EOM's intact. Ears: normal external ears and nose, throat without exudate  Neck: no adenopathy, no carotid bruit, no JVD, supple, symmetrical, trachea midline   Lungs: clear throughout,no rales or wheezes   Heart: regular rate and rhythm, S1, S2 normal, no murmur  Abdomen:soft, non-tender; non-distended, normal bowel sounds no masses, no organomegaly  Extremities:No lower extremity edema,  No erythema, no tenderness to palpation  Skin: Skin color, texture, turgor normal. No rashes or lesions  Lymphatic: No palpable lymph node enlargment  Neurologic: Alert and oriented X 3, LUE ataxia, fluent speech, follows commands with some degree of difficulty when using LUE  Psychiatric: Tearful. Calm.      Medications:      cyanocobalamin  1,000 mcg Intramuscular Daily    escitalopram  10 mg Oral Daily    sodium chloride flush  10 mL Intravenous 2 times per day    enoxaparin  40 mg Subcutaneous Daily    atorvastatin  80 mg Oral Nightly    aspirin  325 mg Oral Daily    Or    aspirin  300 mg Rectal Daily     acetaminophen, sodium chloride flush, magnesium hydroxide, ondansetron, ondansetron  DIET GENERAL;     Lab and other Data:     Recent Labs     08/31/19 2124 09/01/19 0243 09/03/19  0205   WBC 17.1* 11.4* 6.6   HGB 13.5* 12.9* 12.7*    215 212     Recent Labs     08/31/19 2124 09/03/19  0205    139   K 4.1 3.5    99   CO2 23 29   BUN 25* 11   CREATININE 1.0 0.8   GLUCOSE 108 105     Recent Labs     08/31/19 2124   AST 33   ALT 13   BILITOT 0.9   ALKPHOS 40     Troponin T:   Recent Labs     08/31/19 2124   TROPONINI <0.01     INR:   Recent Labs     08/31/19 2124   INR 1.13     UA:  Recent Labs     08/31/19  2321   COLORU YELLOW   PHUR 6.0   WBCUA 1   RBCUA 1   BACTERIA NEGATIVE   CLARITYU Clear   SPECGRAV 1.035   LEUKOCYTESUR Negative   UROBILINOGEN 0.2   BILIRUBINUR Negative   BLOODU SMALL*   GLUCOSEU Negative     A1C:   Recent Labs     09/01/19  0243   LABA1C 5.5     RAD:   Cta Head W Wo Contrast  1.. Persistent fetal origin of the left posterior cerebral artery. This is a normal variant. 2. Otherwise unremarkable CTA of the [place]. Signed by Dr Pamela Blackwell on 9/1/2019 7:34 AM    Xr Elbow Left (min 3 Views)  . No evidence of acute fracture. Signed by Dr Pamela Blackwell on 9/1/2019 7:50 AM    Xr Knee Left (1-2 Views)  1. No acute bony abnormality. Signed by Dr Meka Jeffers on 8/31/2019 10:04 PM    Ct Head Wo Contrast  1. Atrophy and small vessel disease. 2. No acute intracranial abnormality is seen. Signed by Dr Meka Jeffers on 8/31/2019 9:56 PM    Ct Cervical Spine Wo Contrast  1. Extensive degenerative change. 2. No acute fracture. Signed by Dr Meka Jeffers on 8/31/2019 10:00 PM    Xr Chest Portable  1. Chronic lung changes. Signed by Dr Meka Jeffers on 8/31/2019 10:02 PM    Vascular Carotid Duplex Bilateral    Impression   There is heterogeneous plaque visualized in the bilateral internal carotid  artery(ies). There is less than 50% stenosis in the right internal carotid artery. There is less than 50% stenosis of the left internal carotid artery. There is normal antegrade flow in the bilateral vertebral artery(ies). Cta Neck W Contrast  1.. Minimal plaquing involving the right carotid bulb and proximal ICA with a less than 5% cross-sectional stenosis. The left ICA and carotid bifurcation are unremarkable. 2. The right vertebral artery is dominant. Both vertebral arteries are widely patent throughout their course. The proximal great vessels including the origins and aortic arch are remarkable only for ectasia. No evidence of focal stenosis or plaquing. Signed by Dr Pamela Blackwell on 9/1/2019 7:28 AM    Mri Brain Without Contrast  Impression: 1. There is an acute infarct with associated restricted diffusion and ADC mapping abnormality involving the right posterior frontal periventricular white matter.  This suggests this infarct to be less than 10

## 2019-09-03 NOTE — CARE COORDINATION
SO met with Pt/daughter in the room to discuss DC planning options. Pt daughter lives in [place] and would like to have Pt closer to her. Pt daughter discussed a number of facilities in the [place] area. SO has sent referrals to requested facilities.  Awaiting approval/denial.  Electronically signed by Jennifer Murphy on 9/3/2019 at 2:56 PM

## 2019-09-03 NOTE — PROGRESS NOTES
Physical Therapy  Name: Chip Pandey  MRN:  [id number]  Date of service:  9/3/2019       09/03/19 1058   Subjective   Subjective Patient up in chair agrees to therapy (motivated to get moving)   Bed Mobility   Sit to Supine Stand by assistance   Scooting Stand by assistance   Transfers   Sit to Stand Contact guard assistance   Stand to sit Contact guard assistance   Bed to Chair Contact guard assistance   Stand Pivot Transfers Contact guard assistance   Ambulation 1   Surface level tile   Device Rolling Walker   Assistance Contact guard assistance;Minimal assistance   Quality of Gait Ataxic, left LE weak, but patient conscious about it and is able to compensate. Patient tends to 'hug' the left side of the walker and requires cues for walker placement during turns. No left knee buckling noted this treatment. Gait Deviations Decreased step length;Decreased step height  (on left)   Distance 450 feet   Exercises   Comments Supine SLR, heel slides, ankle pumps, and abd/add AROM  (encouraged patient to work on ex's throughout the day)   Other Activities   Comment Assisted patient to and from the bathroom, patient able to stand at sink and wash hands without LOB noted. Short term goals   Time Frame for Short term goals 14 DAYS   Short term goal 1 BED MOB INDEPENDENT   Short term goal 2 TRANSFERS SBA   Short term goal 3 ' AAD SBA   Conditions Requiring Skilled Therapeutic Intervention   Body structures, Functions, Activity limitations Decreased functional mobility ; Decreased ADL status; Decreased ROM; Decreased strength;Decreased safe awareness;Decreased balance;Decreased coordination;Decreased cognition   Assessment Patient doing well with endurance, but seems to lack full awareness of deficits, decreased safety awareness noted. Assisted patient back to bed and left with all needs in reach. Safety Devices   Type of devices All fall risk precautions in place; Bed alarm in place;Call light within reach;Gait

## 2019-09-04 PROCEDURE — 6360000002 HC RX W HCPCS: Performed by: PSYCHIATRY & NEUROLOGY

## 2019-09-04 PROCEDURE — 97116 GAIT TRAINING THERAPY: CPT

## 2019-09-04 PROCEDURE — 6370000000 HC RX 637 (ALT 250 FOR IP): Performed by: PHYSICIAN ASSISTANT

## 2019-09-04 PROCEDURE — 6360000002 HC RX W HCPCS: Performed by: PHYSICIAN ASSISTANT

## 2019-09-04 PROCEDURE — 6370000000 HC RX 637 (ALT 250 FOR IP): Performed by: HOSPITALIST

## 2019-09-04 PROCEDURE — 99232 SBSQ HOSP IP/OBS MODERATE 35: CPT | Performed by: PSYCHIATRY & NEUROLOGY

## 2019-09-04 PROCEDURE — 2580000003 HC RX 258: Performed by: HOSPITALIST

## 2019-09-04 PROCEDURE — 1210000000 HC MED SURG R&B

## 2019-09-04 PROCEDURE — 6370000000 HC RX 637 (ALT 250 FOR IP): Performed by: PSYCHIATRY & NEUROLOGY

## 2019-09-04 PROCEDURE — 6360000002 HC RX W HCPCS: Performed by: HOSPITALIST

## 2019-09-04 RX ORDER — METOPROLOL SUCCINATE 25 MG/1
25 TABLET, EXTENDED RELEASE ORAL 2 TIMES DAILY
Status: DISCONTINUED | OUTPATIENT
Start: 2019-09-04 | End: 2019-09-05 | Stop reason: HOSPADM

## 2019-09-04 RX ORDER — HYDRALAZINE HYDROCHLORIDE 20 MG/ML
5 INJECTION INTRAMUSCULAR; INTRAVENOUS ONCE
Status: COMPLETED | OUTPATIENT
Start: 2019-09-04 | End: 2019-09-04

## 2019-09-04 RX ADMIN — PANTOPRAZOLE SODIUM 40 MG: 40 TABLET, DELAYED RELEASE ORAL at 06:36

## 2019-09-04 RX ADMIN — ATORVASTATIN CALCIUM 80 MG: 80 TABLET, FILM COATED ORAL at 20:17

## 2019-09-04 RX ADMIN — LOPERAMIDE HYDROCHLORIDE 2 MG: 2 CAPSULE ORAL at 09:41

## 2019-09-04 RX ADMIN — Medication 10 ML: at 09:17

## 2019-09-04 RX ADMIN — ESCITALOPRAM OXALATE 10 MG: 10 TABLET ORAL at 09:17

## 2019-09-04 RX ADMIN — METOPROLOL SUCCINATE 25 MG: 25 TABLET, EXTENDED RELEASE ORAL at 20:17

## 2019-09-04 RX ADMIN — QUETIAPINE FUMARATE 25 MG: 25 TABLET ORAL at 20:17

## 2019-09-04 RX ADMIN — HYDRALAZINE HYDROCHLORIDE 5 MG: 20 INJECTION INTRAMUSCULAR; INTRAVENOUS at 10:23

## 2019-09-04 RX ADMIN — ASPIRIN 325 MG: 325 TABLET, DELAYED RELEASE ORAL at 09:19

## 2019-09-04 RX ADMIN — DEXTROMETHORPHAN HYDROBROMIDE AND QUINIDINE SULFATE 1 CAPSULE: 20; 10 CAPSULE, GELATIN COATED ORAL at 16:44

## 2019-09-04 RX ADMIN — CYANOCOBALAMIN 1000 MCG: 1000 INJECTION, SOLUTION INTRAMUSCULAR; SUBCUTANEOUS at 09:17

## 2019-09-04 RX ADMIN — ENOXAPARIN SODIUM 40 MG: 40 INJECTION SUBCUTANEOUS at 09:17

## 2019-09-04 RX ADMIN — CHOLESTYRAMINE 4 G: 4 POWDER, FOR SUSPENSION ORAL at 09:18

## 2019-09-04 RX ADMIN — METOPROLOL SUCCINATE 25 MG: 25 TABLET, EXTENDED RELEASE ORAL at 09:18

## 2019-09-04 ASSESSMENT — PAIN SCALES - GENERAL: PAINLEVEL_OUTOF10: 0

## 2019-09-04 NOTE — PROGRESS NOTES
[facility]    Patient:  Lanny Jay  YOB: 1935  Date of Service: 9/4/2019  MRN: [id number]   Acct: [de-identified]   Primary Care Physician: Ayaz Blanco MD  Advance Directive: Full Code  Admit Date: 8/31/2019       Hospital Day: 4    CHIEF COMPLAINT Acute CVA    SUBJECTIVE:  Mr. Tilman Riedel has no new complaints this morning. Asking same questions about rehab regarding length of time needed and whether or not he can go home. Cumulative Hospital Course:   Mr. Tilman Riedel is an 81year old with PMH hearing loss, HTN, BPH who presented with confusion, difficulty walking, inability to get dressed. Significant other called EMS when he didn't show up at her house for dinner and she stated Mr. Tilman Riedel has significantly impaired memory. He was admitted to [facility] for acute CVA outside of window for TPA and stroke work up. Neurology was consulted and recommended CTA, MRI. CTA was unremarkable. 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 Aspirin and risk factor modification recommended per Neurology as well as inpatient rehab. Review of Systems:   14 point review of systems is negative except as specifically addressed above. Objective:   VITALS:  BP (!) 170/85   Pulse 70   Temp 97.6 °F (36.4 °C) (Temporal)   Resp 20   Ht 5' 9\" (1.753 m)   Wt 200 lb (90.7 kg)   SpO2 95%   BMI 29.53 kg/m²   24HR INTAKE/OUTPUT:      Intake/Output Summary (Last 24 hours) at 9/4/2019 0809  Last data filed at 9/4/2019 0330  Gross per 24 hour   Intake 620 ml   Output --   Net 620 ml     General appearance: alert, appears stated age, cooperative and no distress, [place] resting comfortably in bed   Head: Normocephalic, without obvious abnormality, atraumatic  Eyes: conjunctivae/corneas clear. PERRL, EOM's intact.    Ears: normal external ears and nose, throat without exudate  Neck: no adenopathy, no carotid bruit, no JVD, supple, symmetrical, trachea midline   Lungs: no

## 2019-09-04 NOTE — PROGRESS NOTES
RRR  Musculoskeletal - no significant wasting of muscles noted, no bony deformities, gait no gross ataxia  Extremities-no clubbing, cyanosis or edema  Skin - warm, dry, and intact. No rash, erythema, or pallor. Psychiatric - mood, affect, and behavior appear normal.      Neurology  NEUROLOGICAL EXAM:  Awake, alert, fluent oriented x 3 appropriate affect  Attention and concentration appear appropriate  Note memory intact. Short-term memory 2/4 at 5 minutes  Speech normal without dysarthria  No clear issues with language of fund of knowledge  Cranial Nerve Exam   CN II- Visual fields grossly unremarkable  CN III, IV,VI-EOMI, No nystagmus, conjugate eye movements, no ptosis  CN VII-no facial assymetry  CN VIII-Hearing intact to voice  CN IX and X- Palate elevates in midline  CN XI-good shoulder shrug  CN XII-Tongue midline with no fasciculations or fibrillations  Motor Exam  Right-sided strength is normal.  He has 4/5 weakness left upper extremity with significant ataxia there. Left leg 4+/5.     Reflexes   2+ biceps bilaterally  2+ brachioradialis  2+patella  2+ ankle jerks  No clonus ankles  No Green's sign bilateral hands  Tremors- no tremors in hands or head noted  Gait  Not tested  Coordination  Significant ataxia left upper extremity  ? Nursing/pcp notes, imaging,labs and vitals reviewed.      PT,OT and/or speech notes reviewed    Lab Results   Component Value Date    WBC 6.6 09/03/2019    HGB 12.7 (L) 09/03/2019    HCT 38.3 (L) 09/03/2019    MCV 95.0 (H) 09/03/2019     09/03/2019     Lab Results   Component Value Date     09/03/2019    K 3.5 09/03/2019    CL 99 09/03/2019    CO2 29 09/03/2019    BUN 11 09/03/2019    CREATININE 0.8 09/03/2019    GLUCOSE 105 09/03/2019    CALCIUM 8.9 09/03/2019    PROT 6.8 08/31/2019    LABALBU 4.4 08/31/2019    BILITOT 0.9 08/31/2019    ALKPHOS 40 08/31/2019    AST 33 08/31/2019    ALT 13 08/31/2019    LABGLOM >60 09/03/2019     Lab Results   Component

## 2019-09-05 VITALS
RESPIRATION RATE: 16 BRPM | HEART RATE: 51 BPM | HEIGHT: 69 IN | DIASTOLIC BLOOD PRESSURE: 81 MMHG | BODY MASS INDEX: 29.62 KG/M2 | TEMPERATURE: 97.8 F | SYSTOLIC BLOOD PRESSURE: 146 MMHG | OXYGEN SATURATION: 93 % | WEIGHT: 200 LBS

## 2019-09-05 PROBLEM — I10 ESSENTIAL HYPERTENSION: Status: ACTIVE | Noted: 2019-09-05

## 2019-09-05 PROCEDURE — 97530 THERAPEUTIC ACTIVITIES: CPT

## 2019-09-05 PROCEDURE — 97535 SELF CARE MNGMENT TRAINING: CPT

## 2019-09-05 PROCEDURE — 6370000000 HC RX 637 (ALT 250 FOR IP): Performed by: PSYCHIATRY & NEUROLOGY

## 2019-09-05 PROCEDURE — 97116 GAIT TRAINING THERAPY: CPT

## 2019-09-05 PROCEDURE — 6370000000 HC RX 637 (ALT 250 FOR IP): Performed by: HOSPITALIST

## 2019-09-05 PROCEDURE — 6360000002 HC RX W HCPCS: Performed by: HOSPITALIST

## 2019-09-05 PROCEDURE — 6370000000 HC RX 637 (ALT 250 FOR IP): Performed by: PHYSICIAN ASSISTANT

## 2019-09-05 RX ORDER — CHOLESTYRAMINE LIGHT 4 G/5.7G
4 POWDER, FOR SUSPENSION ORAL DAILY
Qty: 60 PACKET | Refills: 3 | Status: ON HOLD | DISCHARGE
Start: 2019-09-06 | End: 2021-04-06 | Stop reason: HOSPADM

## 2019-09-05 RX ORDER — LISINOPRIL 5 MG/1
5 TABLET ORAL DAILY
Qty: 30 TABLET | Refills: 0 | Status: ON HOLD | DISCHARGE
Start: 2019-09-05 | End: 2021-04-06 | Stop reason: HOSPADM

## 2019-09-05 RX ORDER — ESCITALOPRAM OXALATE 10 MG/1
10 TABLET ORAL DAILY
Qty: 30 TABLET | Refills: 0 | DISCHARGE
Start: 2019-09-06 | End: 2019-11-10

## 2019-09-05 RX ORDER — LOPERAMIDE HYDROCHLORIDE 2 MG/1
2 CAPSULE ORAL 4 TIMES DAILY PRN
DISCHARGE
Start: 2019-09-05 | End: 2019-09-15

## 2019-09-05 RX ORDER — QUETIAPINE FUMARATE 25 MG/1
25 TABLET, FILM COATED ORAL NIGHTLY PRN
Qty: 30 TABLET | Refills: 0 | DISCHARGE
Start: 2019-09-05

## 2019-09-05 RX ORDER — LISINOPRIL 5 MG/1
5 TABLET ORAL DAILY
Status: DISCONTINUED | OUTPATIENT
Start: 2019-09-05 | End: 2019-09-05 | Stop reason: HOSPADM

## 2019-09-05 RX ORDER — ATORVASTATIN CALCIUM 80 MG/1
80 TABLET, FILM COATED ORAL NIGHTLY
Qty: 30 TABLET | Refills: 0 | DISCHARGE
Start: 2019-09-05 | End: 2019-11-10

## 2019-09-05 RX ADMIN — ENOXAPARIN SODIUM 40 MG: 40 INJECTION SUBCUTANEOUS at 08:55

## 2019-09-05 RX ADMIN — ASPIRIN 325 MG: 325 TABLET, DELAYED RELEASE ORAL at 08:55

## 2019-09-05 RX ADMIN — LISINOPRIL 5 MG: 5 TABLET ORAL at 14:35

## 2019-09-05 RX ADMIN — DEXTROMETHORPHAN HYDROBROMIDE AND QUINIDINE SULFATE 1 CAPSULE: 20; 10 CAPSULE, GELATIN COATED ORAL at 16:13

## 2019-09-05 RX ADMIN — METOPROLOL SUCCINATE 25 MG: 25 TABLET, EXTENDED RELEASE ORAL at 08:56

## 2019-09-05 RX ADMIN — CHOLESTYRAMINE 4 G: 4 POWDER, FOR SUSPENSION ORAL at 08:56

## 2019-09-05 RX ADMIN — ESCITALOPRAM OXALATE 10 MG: 10 TABLET ORAL at 08:56

## 2019-09-05 ASSESSMENT — PAIN SCALES - WONG BAKER: WONGBAKER_NUMERICALRESPONSE: 0

## 2019-09-05 ASSESSMENT — PAIN SCALES - GENERAL: PAINLEVEL_OUTOF10: 0

## 2019-09-05 NOTE — PROGRESS NOTES
assistance  Stand to sit: Contact guard assistance                                            Type of ROM/Therapeutic Exercise  Type of ROM/Therapeutic Exercise: AROM  Comment: AROM 10x2 LUE, all joints. Plan   Plan  Times per week: 3-5x/week  Current Treatment Recommendations: Self-Care / ADL, Balance Training, ROM, Strengthening, Endurance Training, Safety Education & Training  G-Code     OutComes Score                                                  AM-PAC Score             Goals  Short term goals  Time Frame for Short term goals: 1 week  Short term goal 1: Supervision with donning pullover shirts  Short term goal 2: Supervision with seated LB dsg  Short term goal 3: CGA with clothing mgmt in standing  Short term goal 4: Able to independently grasp small object at chest level with L hand and move and release object.   Long term goals  Long term goal 1: Return to PLOF       Therapy Time   Individual Concurrent Group Co-treatment   Time In           Time Out           Minutes                   IVAN Fagan

## 2019-09-05 NOTE — PROGRESS NOTES
09/05/19 1233   Restrictions/Precautions   Restrictions/Precautions Fall Risk   General   Chart Reviewed Yes   Subjective   Subjective Patient in chair agrees to walk   Pain Screening   Patient Currently in Pain No   Vital Signs   Level of Consciousness 0   Oxygen Therapy   O2 Device None (Room air)   Transfers   Sit to Stand Stand by assistance   Stand to sit Stand by assistance   Ambulation   Ambulation? Yes   Ambulation 1   Surface level tile   Device Rolling Walker   Assistance Contact guard assistance   Quality of Gait Slightly unsteady   Distance 450'   Comments Patient in chair post gait   Stairs/Curb   Stairs? Yes   Stairs   # Steps  5   Stairs Height 4\"   Rails Bilateral   Device No Device   Assistance Contact guard assistance   Comment Patient did well with stairs   Balance   Standing - Dynamic Fair;+   Conditions Requiring Skilled Therapeutic Intervention   Body structures, Functions, Activity limitations Decreased functional mobility ; Decreased ADL status; Decreased ROM; Decreased strength;Decreased safe awareness;Decreased balance;Decreased coordination;Decreased cognition   Activity Tolerance   Activity Tolerance Patient Tolerated treatment well   Safety Devices   Type of devices Call light within reach; Left in chair  (Family and friend present)        09/05/19 1233   Restrictions/Precautions   Restrictions/Precautions Fall Risk   General   Chart Reviewed Yes   Subjective   Subjective Patient in chair agrees to walk   Pain Screening   Patient Currently in Pain No   Vital Signs   Level of Consciousness 0   Oxygen Therapy   O2 Device None (Room air)   Transfers   Sit to Stand Stand by assistance   Stand to sit Stand by assistance   Ambulation   Ambulation? Yes   Ambulation 1   Surface level tile   Device Rolling Walker   Assistance Contact guard assistance   Quality of Gait Slightly unsteady   Distance 450'   Comments Patient in chair post gait   Stairs/Curb   Stairs?  Yes   Stairs   # Steps  5   Stairs

## 2019-09-05 NOTE — PROGRESS NOTES
[facility]    Patient:  Wayne Magana  YOB: 1935  Date of Service: 9/5/2019  MRN: [id number]   Acct: [de-identified]   Primary Care Physician: Rosa Curtis MD  Advance Directive: Full Code  Admit Date: 8/31/2019       Hospital Day: 5    CHIEF COMPLAINT Acute CVA    SUBJECTIVE:  Mr. Roxane Marx is without new complaint today. Tells me he is sleeping well at night. Continues to be ataxic with LUE. Denies headache, chest pain, heart palpitations, N/V    Cumulative Hospital Course:   Mr. Roxane Marx is an 81year old with PMH hearing loss, HTN, BPH who presented with confusion, difficulty walking, inability to get dressed. Significant other called EMS when he didn't show up at her house for dinner and she stated Mr. Roxane Marx has significantly impaired memory. He was admitted to [facility] for acute [facility]A outside of window for TPA and stroke work up. Neurology was consulted and recommended CTA, MRI. CTA was unremarkable. 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 Aspirin and risk factor modification recommended per Neurology as well as inpatient rehab. Review of Systems:   14 point review of systems is negative except as specifically addressed above. Objective:   VITALS:  BP (!) 146/81   Pulse 51   Temp 97.8 °F (36.6 °C) (Temporal)   Resp 16   Ht 5' 9\" (1.753 m)   Wt 200 lb (90.7 kg)   SpO2 93%   BMI 29.53 kg/m²   24HR INTAKE/OUTPUT:      Intake/Output Summary (Last 24 hours) at 9/5/2019 1057  Last data filed at 9/4/2019 1900  Gross per 24 hour   Intake 360 ml   Output --   Net 360 ml     General appearance: alert, appears stated age, cooperative and no distress, [place], sitting comfortably in bedside chair   Head: Normocephalic, without obvious abnormality, atraumatic  Eyes: conjunctivae/corneas clear. PERRL, EOM's intact.    Ears: normal external ears and nose, throat without exudate  Neck: no adenopathy, no carotid bruit, no JVD, supple, leaflet mobility.   Aortic valve appears to be tricuspid.   No evidence of aortic stenosis; trivial aortic regurgitation.   Mild tricuspid regurgitation with estimated RVSP of 33 mm Hg.   Severely dilated left atrium.   Normal intact intra-atrial septum was noted with no evidence of   significant intra-atrial communications by color flow Doppler or by   agitated saline study.   Left ventricular size is normal .   Mild concentric left ventricular hypertrophy.   Left ventricular ejection fraction is estimated at 55%.   Mildly enlarged right atrium size. Assessment/Plan   Principal Problem:    Acute CVA (cerebrovascular accident) [facility])  Active Problems:    Ataxia involving legs    Memory deficit    Hard of hearing    Dementia    B12 deficiency  Resolved Problems:    * No resolved hospital problems. *    BP mildly improved with Metoprolol continued. Will add low dose lisinopril for improved control now that it has been 5 days since initial presentation. Medically stable for discharge once rehab placement arranged/approved by insurance.      DVT Prophylaxis: Jeanna Benites PA-C

## 2019-09-30 ENCOUNTER — TELEPHONE (OUTPATIENT)
Dept: NEUROLOGY | Age: 84
End: 2019-09-30

## 2019-10-01 ENCOUNTER — TELEPHONE (OUTPATIENT)
Dept: NEUROLOGY | Age: 84
End: 2019-10-01

## 2019-10-09 ENCOUNTER — OUTSIDE FACILITY SERVICE (OUTPATIENT)
Dept: PODIATRY | Facility: CLINIC | Age: 84
End: 2019-10-09

## 2019-10-09 PROCEDURE — 11721 DEBRIDE NAIL 6 OR MORE: CPT | Performed by: PODIATRIST

## 2019-10-28 ENCOUNTER — OFFICE VISIT (OUTPATIENT)
Dept: NEUROLOGY | Age: 84
End: 2019-10-28
Payer: MEDICARE

## 2019-10-28 VITALS
DIASTOLIC BLOOD PRESSURE: 74 MMHG | WEIGHT: 199 LBS | SYSTOLIC BLOOD PRESSURE: 136 MMHG | BODY MASS INDEX: 28.49 KG/M2 | HEART RATE: 59 BPM | HEIGHT: 70 IN

## 2019-10-28 DIAGNOSIS — R41.3 MEMORY LOSS: ICD-10-CM

## 2019-10-28 DIAGNOSIS — Z86.73 HISTORY OF RECENT STROKE: Primary | ICD-10-CM

## 2019-10-28 DIAGNOSIS — Z86.73 HISTORY OF RECENT STROKE: ICD-10-CM

## 2019-10-28 DIAGNOSIS — Z86.79 HISTORY OF HYPERTENSION: ICD-10-CM

## 2019-10-28 LAB — VITAMIN B-12: 309 PG/ML (ref 211–946)

## 2019-10-28 PROCEDURE — G8417 CALC BMI ABV UP PARAM F/U: HCPCS | Performed by: PSYCHIATRY & NEUROLOGY

## 2019-10-28 PROCEDURE — G8427 DOCREV CUR MEDS BY ELIG CLIN: HCPCS | Performed by: PSYCHIATRY & NEUROLOGY

## 2019-10-28 PROCEDURE — 1123F ACP DISCUSS/DSCN MKR DOCD: CPT | Performed by: PSYCHIATRY & NEUROLOGY

## 2019-10-28 PROCEDURE — G8484 FLU IMMUNIZE NO ADMIN: HCPCS | Performed by: PSYCHIATRY & NEUROLOGY

## 2019-10-28 PROCEDURE — G8598 ASA/ANTIPLAT THER USED: HCPCS | Performed by: PSYCHIATRY & NEUROLOGY

## 2019-10-28 PROCEDURE — 4040F PNEUMOC VAC/ADMIN/RCVD: CPT | Performed by: PSYCHIATRY & NEUROLOGY

## 2019-10-28 PROCEDURE — 99214 OFFICE O/P EST MOD 30 MIN: CPT | Performed by: PSYCHIATRY & NEUROLOGY

## 2019-10-28 PROCEDURE — 1036F TOBACCO NON-USER: CPT | Performed by: PSYCHIATRY & NEUROLOGY

## 2019-10-28 RX ORDER — LOPERAMIDE HYDROCHLORIDE 2 MG/1
1 TABLET ORAL 3 TIMES DAILY PRN
Status: ON HOLD | COMMUNITY
End: 2021-04-06 | Stop reason: HOSPADM

## 2019-10-28 RX ORDER — DONEPEZIL HYDROCHLORIDE 5 MG/1
TABLET, FILM COATED ORAL
Qty: 30 TABLET | Refills: 2 | Status: SHIPPED | OUTPATIENT
Start: 2019-10-28 | End: 2019-11-26 | Stop reason: SDUPTHER

## 2019-10-29 ENCOUNTER — TELEPHONE (OUTPATIENT)
Dept: NEUROLOGY | Age: 84
End: 2019-10-29

## 2019-11-10 ENCOUNTER — HOSPITAL ENCOUNTER (EMERGENCY)
Age: 84
Discharge: HOME | End: 2019-11-10
Payer: MEDICARE

## 2019-11-10 VITALS
DIASTOLIC BLOOD PRESSURE: 97 MMHG | HEART RATE: 69 BPM | OXYGEN SATURATION: 94 % | TEMPERATURE: 97.6 F | SYSTOLIC BLOOD PRESSURE: 180 MMHG | RESPIRATION RATE: 15 BRPM

## 2019-11-10 DIAGNOSIS — R04.0 EPISTAXIS: Primary | ICD-10-CM

## 2019-11-10 PROCEDURE — 6370000000 HC RX 637 (ALT 250 FOR IP): Performed by: PHYSICIAN ASSISTANT

## 2019-11-10 PROCEDURE — 99283 EMERGENCY DEPT VISIT LOW MDM: CPT

## 2019-11-10 RX ORDER — OXYMETAZOLINE HYDROCHLORIDE 0.05 G/100ML
2 SPRAY NASAL ONCE
Status: COMPLETED | OUTPATIENT
Start: 2019-11-10 | End: 2019-11-10

## 2019-11-10 RX ADMIN — OXYMETAZOLINE HCL 2 SPRAY: 0.05 SPRAY NASAL at 10:43

## 2019-11-10 ASSESSMENT — ENCOUNTER SYMPTOMS
EYE ITCHING: 0
COUGH: 0
SHORTNESS OF BREATH: 0
PHOTOPHOBIA: 0
EYE DISCHARGE: 0
BACK PAIN: 0
APNEA: 0
COLOR CHANGE: 0

## 2019-11-14 ENCOUNTER — TELEPHONE (OUTPATIENT)
Dept: NEUROLOGY | Age: 84
End: 2019-11-14

## 2019-11-21 ENCOUNTER — TELEPHONE (OUTPATIENT)
Dept: NEUROLOGY | Age: 84
End: 2019-11-21

## 2019-11-26 ENCOUNTER — TELEPHONE (OUTPATIENT)
Dept: NEUROLOGY | Age: 84
End: 2019-11-26

## 2019-11-27 RX ORDER — DONEPEZIL HYDROCHLORIDE 10 MG/1
TABLET, FILM COATED ORAL
Qty: 30 TABLET | Refills: 11 | Status: ON HOLD | OUTPATIENT
Start: 2019-11-27 | End: 2021-04-06 | Stop reason: HOSPADM

## 2020-07-16 ENCOUNTER — OFFICE VISIT (OUTPATIENT)
Dept: INTERNAL MEDICINE | Facility: CLINIC | Age: 85
End: 2020-07-16

## 2020-07-16 VITALS
HEART RATE: 77 BPM | SYSTOLIC BLOOD PRESSURE: 133 MMHG | HEIGHT: 70 IN | BODY MASS INDEX: 27.46 KG/M2 | TEMPERATURE: 97.8 F | RESPIRATION RATE: 18 BRPM | OXYGEN SATURATION: 97 % | WEIGHT: 191.8 LBS | DIASTOLIC BLOOD PRESSURE: 86 MMHG

## 2020-07-16 DIAGNOSIS — I63.9 CEREBROVASCULAR ACCIDENT (CVA), UNSPECIFIED MECHANISM (HCC): ICD-10-CM

## 2020-07-16 DIAGNOSIS — E78.5 HYPERLIPIDEMIA, UNSPECIFIED HYPERLIPIDEMIA TYPE: ICD-10-CM

## 2020-07-16 DIAGNOSIS — F51.01 PRIMARY INSOMNIA: ICD-10-CM

## 2020-07-16 DIAGNOSIS — F03.91 DEMENTIA WITH BEHAVIORAL DISTURBANCE, UNSPECIFIED DEMENTIA TYPE: Primary | ICD-10-CM

## 2020-07-16 PROCEDURE — 99204 OFFICE O/P NEW MOD 45 MIN: CPT | Performed by: INTERNAL MEDICINE

## 2020-07-16 RX ORDER — ESCITALOPRAM OXALATE 10 MG/1
10 TABLET ORAL DAILY
COMMUNITY

## 2020-07-16 RX ORDER — DEXTROMETHORPHAN HYDROBROMIDE AND QUINIDINE SULFATE 20; 10 MG/1; MG/1
1 CAPSULE, GELATIN COATED ORAL 2 TIMES DAILY
COMMUNITY
Start: 2020-07-03

## 2020-07-16 RX ORDER — SILDENAFIL CITRATE 20 MG/1
20 TABLET ORAL AS NEEDED
COMMUNITY
End: 2020-09-25

## 2020-07-16 RX ORDER — OMEPRAZOLE 20 MG/1
40 CAPSULE, DELAYED RELEASE ORAL DAILY
COMMUNITY
End: 2020-09-25

## 2020-07-16 RX ORDER — MEMANTINE HYDROCHLORIDE 5 MG/1
5 TABLET ORAL 2 TIMES DAILY
Qty: 60 TABLET | Refills: 2 | Status: CANCELLED | OUTPATIENT
Start: 2020-07-16

## 2020-07-16 RX ORDER — ATORVASTATIN CALCIUM 80 MG/1
80 TABLET, FILM COATED ORAL NIGHTLY
COMMUNITY
Start: 2019-09-05 | End: 2020-07-16

## 2020-07-16 NOTE — PROGRESS NOTES
"Chief Complaint   Patient presents with   • Establish Care       History:  Matthieu Simpson is a 84 y.o. male who presents today for evaluation of the above problems.      It should be noted the entire history was obtained from the patient's daughters, Jacinda and Юлия, over the telephone.    Patient is here to establish care with me.  He is a previous patient of Dr. Aranda. Wanted to change because he was upset about not being able to drive.    He has past medical history of dementia, hypertension, B12 deficiency, acute stroke August 31, 2019 with subsequent ataxia.    He follows with Dr. Koo at [facility] neurology. Took aricept for one month but made him \"crazy\", took off it    Lexapro 10 mg daily was prescribed by Dr. Aranda and nuedexta by Dr. Wolf ([facility]).  This does provide some benefit    He was taken off all of his other medications due to his behavior.  He resides at the [facility] and has been combative with their staff especially when they tried to give him medications.  Therefore, the family decided to discontinue all medications that were not beneficial for him as he was continuing to get aggressive and upset with medications.    He was on Aricept previously but this caused him to get worse.  He is never tried Namenda.  I discussed with Юлия Monaco today.  See assessment and plan for details.    The patient himself says he feels great and there are no issues.  His daughter told me he was having foot pain and a spot on his left calf.  When I asked the patient today he declined such issues    We attempted in the Mini-Mental status exam today but he was noncompliant and refused to answer most of her questions.    The patient decided to switch from Dr. Aranda because Dr. Aranda had written a letter to the government stating the patient can no longer drive.  The patient felt Dr. Aranda did not look out for his interests and wanted to establish care with a new provider.    He has seen Dr. Neo Lott " in the past for squamous cell carcinoma        HPI    ROS:  Review of Systems   Unable to perform ROS: Dementia       Allergies   Allergen Reactions   • Amoxicillin-Pot Clavulanate Other (See Comments)     Doesn't remember     Past Medical History:   Diagnosis Date   • Allergic rhinitis    • Anemia    • Arthritis    • BPH without obstruction/lower urinary tract symptoms    • Cancer (CMS/HCC)     colon cancer   • Elevated PSA    • Hypertension      Past Surgical History:   Procedure Laterality Date   • APPENDECTOMY     • CHOLECYSTECTOMY     • COLON SURGERY  12/19/2007    colectomy   • COLONOSCOPY  12/19/2014   • COLONOSCOPY N/A 1/12/2017    Procedure: COLONOSCOPY WITH ANESTHESIA;  Surgeon: Aleks Garcia DO;  Location: [facility] ENDOSCOPY;  Service:    • COLONOSCOPY N/A 1/31/2019    Procedure: COLONOSCOPY WITH ANESTHESIA;  Surgeon: Aleks Garcia DO;  Location: [facility] ENDOSCOPY;  Service: Gastroenterology   • GALLBLADDER SURGERY       Family History   Problem Relation Age of Onset   • Liver cancer Father    • No Known Problems Mother    • Colon polyps Neg Hx    • Colon cancer Neg Hx      Matthieu  reports that he quit smoking about 62 years ago. He has never used smokeless tobacco. He reports that he drinks alcohol. He reports that he does not use drugs.    I have reviewed and updated the above documentation (if necessary) including but not limited to chief complaint, ROS, PFSH, allergies and medications        Current Outpatient Medications:   •  escitalopram (LEXAPRO) 10 MG tablet, Take 10 mg by mouth Daily., Disp: , Rfl:   •  metoprolol succinate XL (TOPROL-XL) 25 MG 24 hr tablet, Take 25 mg by mouth 2 (Two) Times a Day., Disp: , Rfl:   •  NUEDEXTA 20-10 MG capsule capsule, Take 1 capsule by mouth 2 (two) times a day., Disp: , Rfl:   •  omeprazole (priLOSEC) 20 MG capsule, Take 40 mg by mouth Daily., Disp: , Rfl:   •  sildenafil (REVATIO) 20 MG tablet, Take 20 mg by mouth As Needed., Disp: , Rfl:   •  aspirin  "325 MG EC tablet, Take 325 mg by mouth Daily., Disp: , Rfl:   •  fexofenadine (ALLEGRA) 180 MG tablet, Take 180 mg by mouth Daily., Disp: , Rfl:     OBJECTIVE:  Visit Vitals  /86 (BP Location: Left arm, Patient Position: Sitting, Cuff Size: Adult)   Pulse 77   Temp 97.8 °F (36.6 °C) (Oral)   Resp 18   Ht 177.8 cm (70\")   Wt 87 kg (191 lb 12.8 oz)   SpO2 97%   BMI 27.52 kg/m²      Physical Exam   Constitutional: He appears well-developed and well-nourished. No distress.   HENT:   Head: Normocephalic and atraumatic.   Right Ear: External ear normal.   Left Ear: External ear normal.   Eyes: Pupils are equal, round, and reactive to light. EOM are normal.   Neck: Phonation normal. No thyromegaly present.   Cardiovascular: Normal rate, regular rhythm and normal heart sounds. Exam reveals no friction rub.   No murmur heard.  Pulmonary/Chest: Effort normal and breath sounds normal. No respiratory distress. He has no wheezes. He has no rales.   Abdominal: Soft. Bowel sounds are normal. He exhibits no distension and no mass. There is no tenderness. There is no rebound and no guarding.   Neurological: He is alert.   Skin: Skin is warm and dry. No erythema. No pallor.   Left lateral calf SK     Psychiatric: His behavior is normal. Judgment and thought content normal. His affect is labile.   He was pleasant to me but did get agitated with my medical assistant when trying to complete the Mini-Mental status exam.  He was also upset with his daughters regarding their interest in his healthcare   Vitals reviewed.      MDM    Assessment/Plan    Matthieu was seen today for establish care.    Diagnoses and all orders for this visit:    Dementia with behavioral disturbance, unspecified dementia type (CMS/HCC)    Cerebrovascular accident (CVA), unspecified mechanism (CMS/HCC)    Hyperlipidemia, unspecified hyperlipidemia type    Primary insomnia      Ultimately, he has advanced dementia with behavioral disturbance.  I have talked " with both of his daughters over the telephone and they would like to remain conservative.  I am in agreement with this.  He resides at the [facility] and has been combative with him in the past regarding medications.  Given the advanced dementia we will need to stay conservative and limit the number of tests, medications or other labs that we are doing.  I discussed the possibility of starting Namenda but ultimately decided against it.  Any medication he had could ultimately backfire as it could make him more agitated or confused, therefore potentially jeopardizing his ability to stay at the [facility].    He has had a stroke in the past, but has refused medications.  As above I think it is okay to stop the Lipitor    After discussing with patient's daughters ultimately the goal is for comfort and to keep him as happy as possible.    The patient denied any foot pain to me and would not allow me to evaluate his foot.    I would like to see him again in 3 to 4 months for an annual Medicare wellness visit.  The patient refused to schedule an appointment in 3 to 4 months in the office.  My office will give him a call to schedule the appointment.  We will then contact the patient's daughters so they can arrange transportation to the office.  This is likely to be the best plan for him to be compliant with office visits as he does not like his daughters making appointments for him.      Patient's Body mass index is 27.52 kg/m². BMI is above normal parameters. Recommendations include: none (medical contraindication).      Education materials and an After Visit Summary were printed and given to the patient at discharge.  Return in about 4 months (around 11/16/2020) for Medicare Wellness.         FRIDA Gage MD   10:55 AM  7/16/2020

## 2020-09-04 DIAGNOSIS — M54.50 LOW BACK PAIN WITHOUT SCIATICA, UNSPECIFIED BACK PAIN LATERALITY, UNSPECIFIED CHRONICITY: Primary | ICD-10-CM

## 2020-09-04 DIAGNOSIS — I63.9 ACUTE CVA (CEREBROVASCULAR ACCIDENT) (HCC): ICD-10-CM

## 2020-09-25 ENCOUNTER — OFFICE VISIT (OUTPATIENT)
Dept: INTERNAL MEDICINE | Facility: CLINIC | Age: 85
End: 2020-09-25

## 2020-09-25 ENCOUNTER — LAB (OUTPATIENT)
Dept: LAB | Facility: HOSPITAL | Age: 85
End: 2020-09-25

## 2020-09-25 VITALS
HEIGHT: 70 IN | TEMPERATURE: 97 F | SYSTOLIC BLOOD PRESSURE: 160 MMHG | RESPIRATION RATE: 16 BRPM | OXYGEN SATURATION: 98 % | WEIGHT: 188.5 LBS | DIASTOLIC BLOOD PRESSURE: 80 MMHG | BODY MASS INDEX: 26.99 KG/M2 | HEART RATE: 68 BPM

## 2020-09-25 DIAGNOSIS — I10 ESSENTIAL HYPERTENSION: ICD-10-CM

## 2020-09-25 DIAGNOSIS — Z00.00 MEDICARE ANNUAL WELLNESS VISIT, SUBSEQUENT: Primary | ICD-10-CM

## 2020-09-25 DIAGNOSIS — Z23 NEED FOR VACCINATION: ICD-10-CM

## 2020-09-25 DIAGNOSIS — Z00.00 MEDICARE ANNUAL WELLNESS VISIT, SUBSEQUENT: ICD-10-CM

## 2020-09-25 LAB
ALBUMIN SERPL-MCNC: 4.8 G/DL (ref 3.5–5.2)
ALBUMIN/GLOB SERPL: 1.8 G/DL
ALP SERPL-CCNC: 48 U/L (ref 39–117)
ALT SERPL W P-5'-P-CCNC: 9 U/L (ref 1–41)
ANION GAP SERPL CALCULATED.3IONS-SCNC: 12 MMOL/L (ref 5–15)
AST SERPL-CCNC: 16 U/L (ref 1–40)
BILIRUB SERPL-MCNC: 0.4 MG/DL (ref 0–1.2)
BUN SERPL-MCNC: 12 MG/DL (ref 8–23)
BUN/CREAT SERPL: 13.6 (ref 7–25)
CALCIUM SPEC-SCNC: 9.7 MG/DL (ref 8.6–10.5)
CHLORIDE SERPL-SCNC: 100 MMOL/L (ref 98–107)
CHOLEST SERPL-MCNC: 187 MG/DL (ref 0–200)
CO2 SERPL-SCNC: 29 MMOL/L (ref 22–29)
CREAT SERPL-MCNC: 0.88 MG/DL (ref 0.76–1.27)
GFR SERPL CREATININE-BSD FRML MDRD: 83 ML/MIN/1.73
GLOBULIN UR ELPH-MCNC: 2.7 GM/DL
GLUCOSE SERPL-MCNC: 102 MG/DL (ref 65–99)
HDLC SERPL-MCNC: 62 MG/DL (ref 40–60)
LDLC SERPL CALC-MCNC: 103 MG/DL (ref 0–100)
LDLC/HDLC SERPL: 1.66 {RATIO}
POTASSIUM SERPL-SCNC: 3.9 MMOL/L (ref 3.5–5.2)
PROT SERPL-MCNC: 7.5 G/DL (ref 6–8.5)
SODIUM SERPL-SCNC: 141 MMOL/L (ref 136–145)
TRIGL SERPL-MCNC: 109 MG/DL (ref 0–150)
VLDLC SERPL-MCNC: 21.8 MG/DL

## 2020-09-25 PROCEDURE — 90694 VACC AIIV4 NO PRSRV 0.5ML IM: CPT | Performed by: INTERNAL MEDICINE

## 2020-09-25 PROCEDURE — G0439 PPPS, SUBSEQ VISIT: HCPCS | Performed by: INTERNAL MEDICINE

## 2020-09-25 PROCEDURE — 36415 COLL VENOUS BLD VENIPUNCTURE: CPT

## 2020-09-25 PROCEDURE — 80053 COMPREHEN METABOLIC PANEL: CPT | Performed by: INTERNAL MEDICINE

## 2020-09-25 PROCEDURE — 80061 LIPID PANEL: CPT | Performed by: INTERNAL MEDICINE

## 2020-09-25 PROCEDURE — G0008 ADMIN INFLUENZA VIRUS VAC: HCPCS | Performed by: INTERNAL MEDICINE

## 2020-09-25 NOTE — PATIENT INSTRUCTIONS
-Check your blood pressure daily for the next 2 weeks. Keep a log and let me know the results of your blood pressure readings at the end of 2 weeks.

## 2020-09-25 NOTE — PROGRESS NOTES
The ABCs of the Annual Wellness Visit  Subsequent Medicare Wellness Visit    Chief Complaint   Patient presents with   • Medicare Wellness-subsequent       Subjective   History of Present Illness:  Matthieu Simpson is a 84 y.o. male who presents for a Subsequent Medicare Wellness Visit.    The patient has no complaints today at all.  He is accompanied by his daughter, Jacinda.  We found out he was not taking his Nuedexta or Lexapro over the last couple weeks but he denies any depression symptoms.  He does say at times he will have a bad day but is not worried about them and he would rather deal with having a bad day then take a daily medication.    He has had a stroke before and takes of a full aspirin every other day.  In the past he has refused multiple medications and is not on a statin as such    HEALTH RISK ASSESSMENT    Recent Hospitalizations:  No hospitalization(s) within the last year.    Current Medical Providers:  Patient Care Team:  KENDELL Gage MD as PCP - General (Internal Medicine)  Matthieu Melton MD as Consulting Physician (Urology)  Aleks Garcia DO as Consulting Physician (Gastroenterology)  Juan Ma MD as Consulting Physician (Urology)     Smoking Status:  Social History     Tobacco Use   Smoking Status Former Smoker   • Quit date:    • Years since quittin.7   Smokeless Tobacco Never Used       Alcohol Consumption:  Social History     Substance and Sexual Activity   Alcohol Use Yes    Comment: sometimes       Depression Screen:   No flowsheet data found.    Fall Risk Screen:  STEADI Fall Risk Assessment was completed, and patient is at MODERATE risk for falls. Assessment completed on:2020    Health Habits and Functional and Cognitive Screening:  Functional & Cognitive Status 2020   Do you have difficulty preparing food and eating? No   Do you have difficulty bathing yourself, getting dressed or grooming yourself? No   Do you have difficulty using the  toilet? No   Do you have difficulty moving around from place to place? Yes   Do you have trouble with steps or getting out of a bed or a chair? No   Current Diet Well Balanced Diet   Dental Exam Up to date   Eye Exam Not up to date   Exercise (times per week) 3 times per week   Current Exercise Activities Include Walking   Do you need help using the phone?  No   Are you deaf or do you have serious difficulty hearing?  Yes   Do you need help with transportation? Yes   Do you need help shopping? No   Do you need help preparing meals?  Yes   Do you need help with housework?  Yes   Do you need help with laundry? Yes   Do you need help taking your medications? Yes   Do you need help managing money? Yes   Do you ever drive or ride in a car without wearing a seat belt? No   Have you felt unusual stress, anger or loneliness in the last month? No   Who do you live with? Community   If you need help, do you have trouble finding someone available to you? No   Have you been bothered in the last four weeks by sexual problems? No   Do you have difficulty concentrating, remembering or making decisions? Yes         Does the patient have evidence of cognitive impairment? Yes    Asprin use counseling:Does not need ASA (and currently is not on it)    Age-appropriate Screening Schedule:  Refer to the list below for future screening recommendations based on patient's age, sex and/or medical conditions. Orders for these recommended tests are listed in the plan section. The patient has been provided with a written plan.    Health Maintenance   Topic Date Due   • TDAP/TD VACCINES (1 - Tdap) 11/06/1954   • ZOSTER VACCINE (1 of 2) 11/06/1986   • LIPID PANEL  09/01/2020   • COLONOSCOPY  01/31/2022   • INFLUENZA VACCINE  Completed          The following portions of the patient's history were reviewed and updated as appropriate: allergies, current medications, past family history, past medical history, past social history, past surgical history  "and problem list.    Outpatient Medications Prior to Visit   Medication Sig Dispense Refill   • aspirin 325 MG EC tablet Take 325 mg by mouth Daily.     • fexofenadine (ALLEGRA) 180 MG tablet Take 180 mg by mouth Daily.     • escitalopram (LEXAPRO) 10 MG tablet Take 10 mg by mouth Daily.     • NUEDEXTA 20-10 MG capsule capsule Take 1 capsule by mouth 2 (two) times a day.     • metoprolol succinate XL (TOPROL-XL) 25 MG 24 hr tablet Take 25 mg by mouth 2 (Two) Times a Day.     • omeprazole (priLOSEC) 20 MG capsule Take 40 mg by mouth Daily.     • sildenafil (REVATIO) 20 MG tablet Take 20 mg by mouth As Needed.       No facility-administered medications prior to visit.        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 Patient does not have an advance directive, information provided.    Review of Systems   Unable to perform ROS: Dementia         Compared to one year ago, the patient feels his physical health is the same.  Compared to one year ago, the patient feels his mental health is worse.    Reviewed chart for potential of high risk medication in the elderly: yes  Reviewed chart for potential of harmful drug interactions in the elderly:yes    Objective         Vitals:    09/25/20 1537   BP: 160/80   BP Location: Left arm   Patient Position: Sitting   Cuff Size: Adult   Pulse: 68   Resp: 16   Temp: 97 °F (36.1 °C)   TempSrc: Oral   SpO2: 98%   Weight: 85.5 kg (188 lb 8 oz)   Height: 177.8 cm (70\")       Body mass index is 27.05 kg/m².  Discussed the patient's BMI with him. The BMI is above average; no BMI management plan is appropriate..    Physical " Exam  Constitutional:       General: He is not in acute distress.     Appearance: He is well-developed.      Comments: Pleasant   HENT:      Head: Normocephalic and atraumatic.      Mouth/Throat:      Pharynx: No oropharyngeal exudate.   Eyes:      General: No scleral icterus.     Conjunctiva/sclera: Conjunctivae normal.      Pupils: Pupils are equal, round, and reactive to light.   Neck:      Musculoskeletal: Normal range of motion and neck supple.      Thyroid: No thyromegaly.      Vascular: No JVD.   Cardiovascular:      Rate and Rhythm: Normal rate and regular rhythm.      Heart sounds: Normal heart sounds. No murmur. No friction rub. No gallop.    Pulmonary:      Effort: Pulmonary effort is normal.      Breath sounds: Normal breath sounds. No stridor. No wheezing or rales.   Abdominal:      General: Bowel sounds are normal. There is no distension.      Palpations: Abdomen is soft.      Tenderness: There is no abdominal tenderness. There is no guarding or rebound.   Musculoskeletal:         General: No tenderness.   Lymphadenopathy:      Cervical: No cervical adenopathy.   Skin:     General: Skin is warm and dry.      Coloration: Skin is not jaundiced or pale.      Comments: SK on his left lateral leg   Neurological:      General: No focal deficit present.      Mental Status: He is alert. Mental status is at baseline.      Cranial Nerves: No cranial nerve deficit.      Comments: Alert and pleasant   Psychiatric:         Mood and Affect: Mood normal.         Behavior: Behavior normal.               Assessment/Plan   Medicare Risks and Personalized Health Plan  CMS Preventative Services Quick Reference  Advance Directive Discussion  Dementia/Memory   Diabetic Lab Screening   Immunizations Discussed/Encouraged (specific immunizations; Influenza )  Inadequate Social Support, Isolation, Loneliness, Lack of Transportation, Financial Difficulties, or Caregiver Stress   Inactivity/Sedentary  Polypharmacy    The above  risks/problems have been discussed with the patient.  Pertinent information has been shared with the patient in the After Visit Summary.  Follow up plans and orders are seen below in the Assessment/Plan Section.    Diagnoses and all orders for this visit:    1. Medicare annual wellness visit, subsequent (Primary)  -     Lipid Panel; Future  -     Comprehensive Metabolic Panel; Future    2. Need for vaccination  -     Fluad Quad 65+ yrs ([id number])    3. Essential hypertension  -     Comprehensive Metabolic Panel; Future      Do believe overall he is doing quite well.  I would like to check a CMP and lipid panel.  Ideally I would start a statin given his past medical history for having a stroke, but he has been very hesitant to start any new medication.  In addition, his blood pressure is elevated today.  He will try to check blood [facility] at the [facility].     He does have underlying dementia, and his daughters will call the office sometime next week to discuss the overall plan.  At the last visit they wanted to be very conservative in treatment and focus more on his comfort      Follow Up:  Return in about 6 months (around 3/25/2021) for Recheck.     An After Visit Summary and PPPS were given to the patient.

## 2020-09-28 ENCOUNTER — TELEPHONE (OUTPATIENT)
Dept: INTERNAL MEDICINE | Facility: CLINIC | Age: 85
End: 2020-09-28

## 2020-10-22 ENCOUNTER — APPOINTMENT (OUTPATIENT)
Dept: CT IMAGING | Age: 85
DRG: 083 | End: 2020-10-22
Payer: MEDICARE

## 2020-10-22 ENCOUNTER — APPOINTMENT (OUTPATIENT)
Dept: GENERAL RADIOLOGY | Age: 85
DRG: 083 | End: 2020-10-22
Payer: MEDICARE

## 2020-10-22 ENCOUNTER — HOSPITAL ENCOUNTER (INPATIENT)
Age: 85
LOS: 2 days | Discharge: NURSING, REHAB OR LONG-TERM CARE | DRG: 083 | End: 2020-10-24
Attending: EMERGENCY MEDICINE | Admitting: HOSPITALIST
Payer: MEDICARE

## 2020-10-22 PROBLEM — S06.34AA TRAUMATIC HEMORRHAGE OF RIGHT CEREBRUM: Status: ACTIVE | Noted: 2020-10-22

## 2020-10-22 PROBLEM — I61.9 INTRACEREBRAL HEMORRHAGE, NONTRAUMATIC (HCC): Status: ACTIVE | Noted: 2020-10-22

## 2020-10-22 PROBLEM — Z85.038 HISTORY OF COLON CANCER: Status: ACTIVE | Noted: 2019-01-22

## 2020-10-22 LAB
ALBUMIN SERPL-MCNC: 4.2 G/DL (ref 3.5–5.2)
ALP BLD-CCNC: 40 U/L (ref 40–130)
ALT SERPL-CCNC: 15 U/L (ref 5–41)
ANION GAP SERPL CALCULATED.3IONS-SCNC: 9 MMOL/L (ref 7–19)
APTT: 28.1 SEC (ref 26–36.2)
AST SERPL-CCNC: 22 U/L (ref 5–40)
BASOPHILS ABSOLUTE: 0 K/UL (ref 0–0.2)
BASOPHILS RELATIVE PERCENT: 0.3 % (ref 0–1)
BILIRUB SERPL-MCNC: 0.6 MG/DL (ref 0.2–1.2)
BUN BLDV-MCNC: 12 MG/DL (ref 8–23)
CALCIUM SERPL-MCNC: 9.5 MG/DL (ref 8.8–10.2)
CHLORIDE BLD-SCNC: 100 MMOL/L (ref 98–111)
CO2: 27 MMOL/L (ref 22–29)
CREAT SERPL-MCNC: 0.9 MG/DL (ref 0.5–1.2)
EOSINOPHILS ABSOLUTE: 0.2 K/UL (ref 0–0.6)
EOSINOPHILS RELATIVE PERCENT: 1.8 % (ref 0–5)
GFR AFRICAN AMERICAN: >59
GFR NON-AFRICAN AMERICAN: >60
GLUCOSE BLD-MCNC: 103 MG/DL (ref 74–109)
HBA1C MFR BLD: 5.5 % (ref 4–6)
HCT VFR BLD CALC: 40.5 % (ref 42–52)
HEMOGLOBIN: 13.3 G/DL (ref 14–18)
IMMATURE GRANULOCYTES #: 0 K/UL
INR BLD: 1.02 (ref 0.88–1.18)
LYMPHOCYTES ABSOLUTE: 1 K/UL (ref 1.1–4.5)
LYMPHOCYTES RELATIVE PERCENT: 9.5 % (ref 20–40)
MCH RBC QN AUTO: 31 PG (ref 27–31)
MCHC RBC AUTO-ENTMCNC: 32.8 G/DL (ref 33–37)
MCV RBC AUTO: 94.4 FL (ref 80–94)
MONOCYTES ABSOLUTE: 0.8 K/UL (ref 0–0.9)
MONOCYTES RELATIVE PERCENT: 8.1 % (ref 0–10)
NEUTROPHILS ABSOLUTE: 8.2 K/UL (ref 1.5–7.5)
NEUTROPHILS RELATIVE PERCENT: 79.9 % (ref 50–65)
PDW BLD-RTO: 13 % (ref 11.5–14.5)
PLATELET # BLD: 245 K/UL (ref 130–400)
PMV BLD AUTO: 10.2 FL (ref 9.4–12.4)
POTASSIUM REFLEX MAGNESIUM: 4.4 MMOL/L (ref 3.5–5)
PROTHROMBIN TIME: 13.3 SEC (ref 12–14.6)
RBC # BLD: 4.29 M/UL (ref 4.7–6.1)
SODIUM BLD-SCNC: 136 MMOL/L (ref 136–145)
TOTAL PROTEIN: 7.1 G/DL (ref 6.6–8.7)
WBC # BLD: 10.2 K/UL (ref 4.8–10.8)

## 2020-10-22 PROCEDURE — 99999 PR OFFICE/OUTPT VISIT,PROCEDURE ONLY: CPT | Performed by: EMERGENCY MEDICINE

## 2020-10-22 PROCEDURE — 85610 PROTHROMBIN TIME: CPT

## 2020-10-22 PROCEDURE — 96374 THER/PROPH/DIAG INJ IV PUSH: CPT

## 2020-10-22 PROCEDURE — 80053 COMPREHEN METABOLIC PANEL: CPT

## 2020-10-22 PROCEDURE — 1210000000 HC MED SURG R&B

## 2020-10-22 PROCEDURE — 85730 THROMBOPLASTIN TIME PARTIAL: CPT

## 2020-10-22 PROCEDURE — 85025 COMPLETE CBC W/AUTO DIFF WBC: CPT

## 2020-10-22 PROCEDURE — 6360000002 HC RX W HCPCS: Performed by: PHYSICIAN ASSISTANT

## 2020-10-22 PROCEDURE — 83036 HEMOGLOBIN GLYCOSYLATED A1C: CPT

## 2020-10-22 PROCEDURE — 6360000002 HC RX W HCPCS: Performed by: EMERGENCY MEDICINE

## 2020-10-22 PROCEDURE — 71045 X-RAY EXAM CHEST 1 VIEW: CPT

## 2020-10-22 PROCEDURE — 72125 CT NECK SPINE W/O DYE: CPT

## 2020-10-22 PROCEDURE — 99285 EMERGENCY DEPT VISIT HI MDM: CPT

## 2020-10-22 PROCEDURE — 96372 THER/PROPH/DIAG INJ SC/IM: CPT

## 2020-10-22 PROCEDURE — 36415 COLL VENOUS BLD VENIPUNCTURE: CPT

## 2020-10-22 PROCEDURE — 70450 CT HEAD/BRAIN W/O DYE: CPT

## 2020-10-22 PROCEDURE — 93005 ELECTROCARDIOGRAM TRACING: CPT | Performed by: PHYSICIAN ASSISTANT

## 2020-10-22 RX ORDER — SODIUM CHLORIDE 0.9 % (FLUSH) 0.9 %
10 SYRINGE (ML) INJECTION EVERY 12 HOURS SCHEDULED
Status: DISCONTINUED | OUTPATIENT
Start: 2020-10-22 | End: 2020-10-24 | Stop reason: HOSPADM

## 2020-10-22 RX ORDER — ACETAMINOPHEN 325 MG/1
650 TABLET ORAL EVERY 4 HOURS PRN
Status: DISCONTINUED | OUTPATIENT
Start: 2020-10-22 | End: 2020-10-24 | Stop reason: HOSPADM

## 2020-10-22 RX ORDER — LABETALOL HYDROCHLORIDE 5 MG/ML
10 INJECTION, SOLUTION INTRAVENOUS EVERY 10 MIN PRN
Status: DISCONTINUED | OUTPATIENT
Start: 2020-10-22 | End: 2020-10-24 | Stop reason: HOSPADM

## 2020-10-22 RX ORDER — HALOPERIDOL 5 MG/ML
5 INJECTION INTRAMUSCULAR ONCE
Status: DISCONTINUED | OUTPATIENT
Start: 2020-10-22 | End: 2020-10-22

## 2020-10-22 RX ORDER — HALOPERIDOL 5 MG/ML
5 INJECTION INTRAMUSCULAR ONCE
Status: COMPLETED | OUTPATIENT
Start: 2020-10-22 | End: 2020-10-22

## 2020-10-22 RX ORDER — ONDANSETRON 2 MG/ML
4 INJECTION INTRAMUSCULAR; INTRAVENOUS EVERY 6 HOURS PRN
Status: DISCONTINUED | OUTPATIENT
Start: 2020-10-22 | End: 2020-10-24 | Stop reason: HOSPADM

## 2020-10-22 RX ORDER — DONEPEZIL HYDROCHLORIDE 5 MG/1
10 TABLET, FILM COATED ORAL DAILY
Status: DISCONTINUED | OUTPATIENT
Start: 2020-10-22 | End: 2020-10-24 | Stop reason: HOSPADM

## 2020-10-22 RX ORDER — ROSUVASTATIN CALCIUM 20 MG/1
40 TABLET, COATED ORAL NIGHTLY
Status: DISCONTINUED | OUTPATIENT
Start: 2020-10-22 | End: 2020-10-24 | Stop reason: HOSPADM

## 2020-10-22 RX ORDER — SODIUM CHLORIDE 0.9 % (FLUSH) 0.9 %
10 SYRINGE (ML) INJECTION PRN
Status: DISCONTINUED | OUTPATIENT
Start: 2020-10-22 | End: 2020-10-24 | Stop reason: HOSPADM

## 2020-10-22 RX ORDER — LORAZEPAM 2 MG/ML
1 INJECTION INTRAMUSCULAR ONCE
Status: COMPLETED | OUTPATIENT
Start: 2020-10-22 | End: 2020-10-22

## 2020-10-22 RX ORDER — QUETIAPINE FUMARATE 50 MG/1
100 TABLET, FILM COATED ORAL ONCE
Status: DISCONTINUED | OUTPATIENT
Start: 2020-10-22 | End: 2020-10-22

## 2020-10-22 RX ORDER — DIPHENHYDRAMINE HYDROCHLORIDE 50 MG/ML
25 INJECTION INTRAMUSCULAR; INTRAVENOUS ONCE
Status: DISCONTINUED | OUTPATIENT
Start: 2020-10-22 | End: 2020-10-22

## 2020-10-22 RX ORDER — LOPERAMIDE HYDROCHLORIDE 2 MG/1
2 CAPSULE ORAL 3 TIMES DAILY PRN
Status: DISCONTINUED | OUTPATIENT
Start: 2020-10-22 | End: 2020-10-24 | Stop reason: HOSPADM

## 2020-10-22 RX ORDER — PROMETHAZINE HYDROCHLORIDE 25 MG/1
12.5 TABLET ORAL EVERY 6 HOURS PRN
Status: DISCONTINUED | OUTPATIENT
Start: 2020-10-22 | End: 2020-10-24 | Stop reason: HOSPADM

## 2020-10-22 RX ORDER — METOPROLOL SUCCINATE 25 MG/1
25 TABLET, EXTENDED RELEASE ORAL 2 TIMES DAILY
Status: DISCONTINUED | OUTPATIENT
Start: 2020-10-22 | End: 2020-10-24 | Stop reason: HOSPADM

## 2020-10-22 RX ORDER — DIPHENHYDRAMINE HYDROCHLORIDE 50 MG/ML
25 INJECTION INTRAMUSCULAR; INTRAVENOUS ONCE
Status: COMPLETED | OUTPATIENT
Start: 2020-10-22 | End: 2020-10-22

## 2020-10-22 RX ORDER — QUETIAPINE FUMARATE 50 MG/1
100 TABLET, FILM COATED ORAL ONCE
Status: COMPLETED | OUTPATIENT
Start: 2020-10-22 | End: 2020-10-23

## 2020-10-22 RX ORDER — CHOLESTYRAMINE LIGHT 4 G/5.7G
4 POWDER, FOR SUSPENSION ORAL DAILY
Status: DISCONTINUED | OUTPATIENT
Start: 2020-10-23 | End: 2020-10-24 | Stop reason: HOSPADM

## 2020-10-22 RX ORDER — PANTOPRAZOLE SODIUM 40 MG/1
40 TABLET, DELAYED RELEASE ORAL
Status: DISCONTINUED | OUTPATIENT
Start: 2020-10-23 | End: 2020-10-24 | Stop reason: HOSPADM

## 2020-10-22 RX ADMIN — HALOPERIDOL LACTATE 5 MG: 5 INJECTION INTRAMUSCULAR at 18:32

## 2020-10-22 RX ADMIN — LORAZEPAM 1 MG: 2 INJECTION INTRAMUSCULAR; INTRAVENOUS at 20:39

## 2020-10-22 RX ADMIN — DIPHENHYDRAMINE HYDROCHLORIDE 25 MG: 50 INJECTION, SOLUTION INTRAMUSCULAR; INTRAVENOUS at 19:17

## 2020-10-22 RX ADMIN — HALOPERIDOL LACTATE 5 MG: 5 INJECTION INTRAMUSCULAR at 19:17

## 2020-10-22 ASSESSMENT — ENCOUNTER SYMPTOMS
FACIAL SWELLING: 1
SHORTNESS OF BREATH: 0
COLOR CHANGE: 0
APNEA: 0
COUGH: 0
EYE ITCHING: 0
EYE DISCHARGE: 0
PHOTOPHOBIA: 0
BACK PAIN: 0

## 2020-10-22 ASSESSMENT — PAIN SCALES - WONG BAKER: WONGBAKER_NUMERICALRESPONSE: 4

## 2020-10-22 ASSESSMENT — PAIN SCALES - GENERAL: PAINLEVEL_OUTOF10: 4

## 2020-10-22 NOTE — ED PROVIDER NOTES
[facility] [place] [facility] EMERGENCY DEPT  eMERGENCYdEPARTMENT eNCOUnter      Pt Name: Marcelo Chiu  MRN: [id number]  [place] 1935  Date of evaluation: 10/22/2020  Provider:CHERIE Gray    CHIEF COMPLAINT       Chief Complaint   Patient presents with    Head Injury     arrived via EMS from assisted living, was outside, unwitnessed fall, has lg hematoma to right side of forehead, does not remember         HISTORY OF PRESENT ILLNESS  (Location/Symptom, Timing/Onset, Context/Setting, Quality, Duration, Modifying Factors, Severity.)   Marcelo Chiu is a 81 y.o. male who presents to the emergency department with fall today. This happened this afternoon unwitnessed found outside his apartment he lives at a assisted living nursing home [place] [address]. His daughters are coming. [facility] to get more collateral.  He has not ambulated since this fall brought in by EMS. Patient has ataxia involving the legs listed along with dementia memory issues I see Aricept listed medications but no thinners. He has mainly significant swelling over the front and temporal aspect of his head he admits to some neck pain denies any vision changes. He is very pleasant and confused. He does not know where he is. Follows commands well. HPI    Nursing Notes were reviewed and I agree. REVIEW OF SYSTEMS    (2-9 systems for level 4, 10 or more for level 5)     Review of Systems   Constitutional: Negative for activity change, appetite change, chills and fever. HENT: Positive for facial swelling. Negative for congestion and dental problem. Eyes: Negative for photophobia, discharge and itching. Respiratory: Negative for apnea, cough and shortness of breath. Cardiovascular: Negative for chest pain. Musculoskeletal: Negative for arthralgias, back pain, gait problem, myalgias and neck pain. Skin: Positive for wound. Negative for color change, pallor and rash. Neurological: Positive for syncope and facial asymmetry.  Negative for dizziness and Spouse name: None    Number of children: None    Years of education: None    Highest education level: None   Occupational History    None   Social Needs    Financial resource strain: None    Food insecurity     Worry: None     Inability: None    Transportation needs     Medical: None     Non-medical: None   Tobacco Use    Smoking status: Never Smoker    Smokeless tobacco: Never Used   Substance and Sexual Activity    Alcohol use: Yes     Comment: 4 per week    Drug use: Never    Sexual activity: None   Lifestyle    Physical activity     Days per week: None     Minutes per session: None    Stress: None   Relationships    Social connections     Talks on phone: None     Gets together: None     Attends Jehov[employer] witness service: None     Active member of club or organization: None     Attends meetings of clubs or organizations: None     Relationship status: None    Intimate partner violence     Fear of current or ex partner: None     Emotionally abused: None     Physically abused: None     Forced sexual activity: None   Other Topics Concern    None   Social History Narrative    None       SCREENINGS   NIH Stroke Scale  Interval: Baseline  Level of Consciousness (1a. ): Alert  LOC Questions (1b. ): Answers neither question correctly  LOC Commands (1c. ): Performs neither task correctly  Best Gaze (2. ): Normal  Visual (3. ): No visual loss  Facial Palsy (4. ): Normal symmetrical movement  Motor Arm, Left (5a. ): No drift  Motor Arm, Right (5b. ): No drift  Motor Leg, Left (6a. ): No drift  Motor Leg, Right (6b. ): No drift  Limb Ataxia (7. ): Absent  Sensory (8. ): Normal  Best Language (9. ): No aphasia  Dysarthria (10. ): NormalGlasgow Coma Scale  Eye Opening: Spontaneous  Best Verbal Response: Confused  Best Motor Response:  Withdraws from pain  Boss Coma Scale Score: 12      PHYSICAL EXAM    (up to 7 forlevel 4, 8 or more for level 5)     ED Triage Vitals [10/22/20 1716]   BP Temp Temp src Pulse Resp SpO2 Height Weight   (!) 155/89 98.1 °F (36.7 °C) -- 71 14 95 % -- --       Physical Exam  Vitals signs and nursing note reviewed. Constitutional:       General: He is not in acute distress. Appearance: Normal appearance. He is well-developed. He is not diaphoretic. HENT:      Head:        Right Ear: Tympanic membrane, ear canal and external ear normal.      Left Ear: Tympanic membrane, ear canal and external ear normal.      Nose: Nose normal.      Mouth/Throat:      Mouth: Mucous membranes are moist.   Eyes:      Pupils: Pupils are equal, round, and reactive to light. Neck:      Musculoskeletal: Normal range of motion and neck supple. Trachea: No tracheal deviation. Cardiovascular:      Rate and Rhythm: Normal rate and regular rhythm. Pulses: Normal pulses. Heart sounds: Normal heart sounds. No murmur. Pulmonary:      Effort: Pulmonary effort is normal.      Breath sounds: Normal breath sounds. No stridor. No wheezing. Chest:      Chest wall: No tenderness. Abdominal:      General: Abdomen is flat. Bowel sounds are normal. There is no distension. Palpations: Abdomen is soft. Tenderness: There is no abdominal tenderness. Musculoskeletal: Normal range of motion. Skin:     General: Skin is warm and dry. Capillary Refill: Capillary refill takes less than 2 seconds. Neurological:      Mental Status: He is alert. He is disoriented. Psychiatric:      Comments: dementia           DIAGNOSTIC RESULTS     RADIOLOGY:   Non-plain film images such as CT, Ultrasound and MRI are read by the radiologist. Plain radiographic images are visualized and preliminarilyinterpreted by Luis Wolf MD with the below findings:        Interpretation per the Radiologist below, if available at the time of this note:    XR CHEST PORTABLE   Final Result   1. No acute appearing infiltrate. Old granulomatous disease. 2. Cardiomegaly. No CHF.    Signed by Dr Eva Flores on 10/22/2020 6:42 PM      CT Cervical Spine WO Contrast   Final Result   1. No acute cervical spine fracture is identified. 2. Degenerative changes of the cervical spine, as described. The full report of this exam was immediately signed and available to   the emergency room. The patient is currently in the emergency room. Signed by Dr Elza Farfan on 10/22/2020 6:28 PM      CT Head WO Contrast   Final Result   1. There is a 1.2 cm parenchymal hemorrhage in the right   frontal-parietal region laterally with no significant mass effect or   sulcal effacement. 2. Mild to moderate atrophy with associated ventricular prominence. 3. Low-density in the hemispheric white matter is nonspecific and   likely due to chronic small vessel disease. 4. Extracranial hematoma in the right frontal region laterally. No   calvarial fracture. Results called to Rodrigo Harden, physician assistant. Signed by Dr Elza Farfan on 10/22/2020 6:17 PM      CT FACIAL BONES WO CONTRAST    (Results Pending)   CT HEAD WO CONTRAST    (Results Pending)       LABS:  Labs Reviewed   CBC WITH AUTO DIFFERENTIAL - Abnormal; Notable for the following components:       Result Value    RBC 4.29 (*)     Hemoglobin 13.3 (*)     Hematocrit 40.5 (*)     MCV 94.4 (*)     MCHC 32.8 (*)     Neutrophils % 79.9 (*)     Lymphocytes % 9.5 (*)     Neutrophils Absolute 8.2 (*)     Lymphocytes Absolute 1.0 (*)     All other components within normal limits   COMPREHENSIVE METABOLIC PANEL W/ REFLEX TO MG FOR LOW K   PROTIME-INR   APTT   HEMOGLOBIN A1C   URINE RT REFLEX TO CULTURE   BASIC METABOLIC PANEL   LIPID PANEL   CBC       All other labs were within normal range or notreturned as of this dictation.     RE-ASSESSMENT        EMERGENCY DEPARTMENT COURSE and DIFFERENTIAL DIAGNOSIS/MDM:   Vitals:    Vitals:    10/22/20 1716 10/22/20 1800 10/22/20 1832   BP: (!) 155/89 (!) 152/66 139/89   Pulse: 71 80 74   Resp: 14 (!) 116 17   Temp: 98.1 °F (36.7 °C)     SpO2: 95% 95% 95% MDM  I have spoken with Dr. Lor Calvert with neurosurgery he is not terribly impressed with the read he would like to repeat CT in the morning and anticipates potential for discharge versus surgical intervention. No indication at this time to go to ICU as he is alert and very aside from pleasant confusion normal neurologic exam.  Dr. Suzy Paulino agrees to admit to medical floor as ICU not indicated. We will await for bed request.  His daughters are made aware. He is a DNR. PROCEDURES:    Procedures      FINAL IMPRESSION      1. Fall from standing, initial encounter    2. Intraparenchymal hematoma of brain, right, with loss of consciousness, initial encounter ([facility] [address]    3. Dementia with behavioral disturbance, unspecified dementia type ([facility] [address]    4. History of CVA in adulthood          DISPOSITION/PLAN   DISPOSITION Admitted 10/22/2020 07:31:56 PM      PATIENT REFERRED TO:  No follow-up provider specified.     DISCHARGE MEDICATIONS:  New Prescriptions    No medications on file       (Please note that portions of this note were completed with a voice recognition program.  Efforts were made to edit the dictations but occasionallywords are mis-transcribed.)    Jenny Albert [address] [place] [address]  10/22/20 6610

## 2020-10-23 ENCOUNTER — APPOINTMENT (OUTPATIENT)
Dept: CT IMAGING | Age: 85
DRG: 083 | End: 2020-10-23
Payer: MEDICARE

## 2020-10-23 PROBLEM — Z51.5 PALLIATIVE CARE PATIENT: Status: ACTIVE | Noted: 2020-10-23

## 2020-10-23 LAB
ANION GAP SERPL CALCULATED.3IONS-SCNC: 12 MMOL/L (ref 7–19)
BUN BLDV-MCNC: 10 MG/DL (ref 8–23)
CALCIUM SERPL-MCNC: 9.2 MG/DL (ref 8.8–10.2)
CHLORIDE BLD-SCNC: 104 MMOL/L (ref 98–111)
CHOLESTEROL, TOTAL: 177 MG/DL (ref 160–199)
CO2: 24 MMOL/L (ref 22–29)
CREAT SERPL-MCNC: 0.8 MG/DL (ref 0.5–1.2)
EKG P AXIS: 13 DEGREES
EKG P-R INTERVAL: 92 MS
EKG Q-T INTERVAL: 410 MS
EKG QRS DURATION: 134 MS
EKG QTC CALCULATION (BAZETT): 434 MS
EKG T AXIS: -25 DEGREES
GFR AFRICAN AMERICAN: >59
GFR NON-AFRICAN AMERICAN: >60
GLUCOSE BLD-MCNC: 98 MG/DL (ref 74–109)
HCT VFR BLD CALC: 39 % (ref 42–52)
HDLC SERPL-MCNC: 60 MG/DL (ref 55–121)
HEMOGLOBIN: 13 G/DL (ref 14–18)
LDL CHOLESTEROL CALCULATED: 99 MG/DL
MCH RBC QN AUTO: 30.7 PG (ref 27–31)
MCHC RBC AUTO-ENTMCNC: 33.3 G/DL (ref 33–37)
MCV RBC AUTO: 92 FL (ref 80–94)
PDW BLD-RTO: 13 % (ref 11.5–14.5)
PLATELET # BLD: 248 K/UL (ref 130–400)
PMV BLD AUTO: 10.4 FL (ref 9.4–12.4)
POTASSIUM SERPL-SCNC: 3.9 MMOL/L (ref 3.5–5)
RBC # BLD: 4.24 M/UL (ref 4.7–6.1)
SODIUM BLD-SCNC: 140 MMOL/L (ref 136–145)
TRIGL SERPL-MCNC: 88 MG/DL (ref 0–149)
WBC # BLD: 8.3 K/UL (ref 4.8–10.8)

## 2020-10-23 PROCEDURE — 97116 GAIT TRAINING THERAPY: CPT

## 2020-10-23 PROCEDURE — 99221 1ST HOSP IP/OBS SF/LOW 40: CPT | Performed by: NEUROLOGICAL SURGERY

## 2020-10-23 PROCEDURE — 80061 LIPID PANEL: CPT

## 2020-10-23 PROCEDURE — 6360000002 HC RX W HCPCS: Performed by: PHYSICIAN ASSISTANT

## 2020-10-23 PROCEDURE — 80048 BASIC METABOLIC PNL TOTAL CA: CPT

## 2020-10-23 PROCEDURE — 85027 COMPLETE CBC AUTOMATED: CPT

## 2020-10-23 PROCEDURE — 97535 SELF CARE MNGMENT TRAINING: CPT

## 2020-10-23 PROCEDURE — 6370000000 HC RX 637 (ALT 250 FOR IP): Performed by: EMERGENCY MEDICINE

## 2020-10-23 PROCEDURE — 70450 CT HEAD/BRAIN W/O DYE: CPT

## 2020-10-23 PROCEDURE — 36415 COLL VENOUS BLD VENIPUNCTURE: CPT

## 2020-10-23 PROCEDURE — 97165 OT EVAL LOW COMPLEX 30 MIN: CPT

## 2020-10-23 PROCEDURE — 97161 PT EVAL LOW COMPLEX 20 MIN: CPT

## 2020-10-23 PROCEDURE — 92610 EVALUATE SWALLOWING FUNCTION: CPT

## 2020-10-23 PROCEDURE — 1210000000 HC MED SURG R&B

## 2020-10-23 PROCEDURE — 70486 CT MAXILLOFACIAL W/O DYE: CPT

## 2020-10-23 PROCEDURE — 92523 SPEECH SOUND LANG COMPREHEN: CPT

## 2020-10-23 PROCEDURE — 93010 ELECTROCARDIOGRAM REPORT: CPT | Performed by: INTERNAL MEDICINE

## 2020-10-23 PROCEDURE — 6370000000 HC RX 637 (ALT 250 FOR IP): Performed by: PHYSICIAN ASSISTANT

## 2020-10-23 RX ORDER — ZIPRASIDONE MESYLATE 20 MG/ML
20 INJECTION, POWDER, LYOPHILIZED, FOR SOLUTION INTRAMUSCULAR ONCE
Status: COMPLETED | OUTPATIENT
Start: 2020-10-23 | End: 2020-10-23

## 2020-10-23 RX ORDER — ZIPRASIDONE MESYLATE 20 MG/ML
10 INJECTION, POWDER, LYOPHILIZED, FOR SOLUTION INTRAMUSCULAR EVERY 6 HOURS PRN
Status: DISCONTINUED | OUTPATIENT
Start: 2020-10-23 | End: 2020-10-24

## 2020-10-23 RX ADMIN — QUETIAPINE FUMARATE 100 MG: 50 TABLET ORAL at 19:59

## 2020-10-23 RX ADMIN — DEXTROMETHORPHAN HYDROBROMIDE AND QUINIDINE SULFATE 1 CAPSULE: 20; 10 CAPSULE, GELATIN COATED ORAL at 21:48

## 2020-10-23 RX ADMIN — ZIPRASIDONE MESYLATE 10 MG: 20 INJECTION, POWDER, LYOPHILIZED, FOR SOLUTION INTRAMUSCULAR at 21:45

## 2020-10-23 RX ADMIN — ROSUVASTATIN CALCIUM 40 MG: 20 TABLET, FILM COATED ORAL at 21:47

## 2020-10-23 RX ADMIN — ZIPRASIDONE MESYLATE 20 MG: 20 INJECTION, POWDER, LYOPHILIZED, FOR SOLUTION INTRAMUSCULAR at 14:40

## 2020-10-23 RX ADMIN — METOPROLOL SUCCINATE 25 MG: 25 TABLET, EXTENDED RELEASE ORAL at 21:48

## 2020-10-23 ASSESSMENT — PAIN SCALES - GENERAL: PAINLEVEL_OUTOF10: 0

## 2020-10-23 NOTE — PROGRESS NOTES
Neurosurgery  Full Consult to follow in AM  Case discussed with ED team.  Patient found down, unwitnessed fall  Right scalp hematoma. Patient is awake alert and answer question approprietly. VERDUZCO well. CT reviewed:  Small right parietal superficial intraparenchymal contusion that measures 1.2 cm. No significant mass effect. Diffuse cortical atrophy and chronic microvascular changes noted.   Platelets and coags normal    Plan  5th floor   Repeat CT head in AM  Hold ASA and other anticoagulation/antiplatelets

## 2020-10-23 NOTE — PROGRESS NOTES
Physical Therapy    Facility/Department: [facility] SURG SERVICES  Initial Assessment    NAME: Piotr Cuevas  : 1935  MRN: [id number]    Date of Service: 10/23/2020    Discharge Recommendations:  Continue to assess pending progress(ANTICIPATE Pt REQUIRING PLACMENT AT A FACILITY DUE TO CONFUSION)   PT Equipment Recommendations  Other: NO NEEDS IDENTIFIED AT EVAL    Assessment   Body structures, Functions, Activity limitations: Decreased endurance;Decreased safe awareness;Decreased functional mobility   Assessment: pT APPEARS TO BE AT BASELINE FOR MOBILITY BUT WOULD LIKE TO FURTHER ASSESS Pt WHEN AMB LONGER DISTANCES TO ENSURE SAFETY AND DEC RISK FOR A FALL  Prognosis: Good  Decision Making: Low Complexity  PT Education: General Safety  Barriers to Learning: DEMENTIA  REQUIRES PT FOLLOW UP: Yes  Activity Tolerance  Activity Tolerance: Patient Tolerated treatment well       Patient Diagnosis(es): The primary encounter diagnosis was Fall from standing, initial encounter. Diagnoses of Intraparenchymal hematoma of brain, right, with loss of consciousness, initial encounter ([facility] [address] Dementia with behavioral disturbance, unspecified dementia type ([facility] [address] and History of CVA in adulthood were also pertinent to this visit. has a past medical history of Allergic rhinitis, BPH (benign prostatic hyperplasia), Cholelithiases, Colon cancer ([address] Colon polyp, GERD (gastroesophageal reflux disease), Hearing loss, Hyperlipidemia, Hypertension, Palliative care patient, and Spastic colon. has a past surgical history that includes Appendectomy; Colon surgery; Rectal surgery; and Cholecystectomy, laparoscopic (12). Restrictions     Vision/Hearing        Subjective  General  Diagnosis: FALL IWTH R PARIETAL CONTUSION  Follows Commands: Within Functional Limits  Subjective  Subjective: Pt REPORTS  HE IS NOT HAVING ANY PAIN. pt WAS NOT ABLE TO TELL PT WHY HE WAS IN THE HOSPITAL BUT WAS ABLE TO STATE HIS NAME.  INTRODUCED SELF TO Pt BUT pt WOULD EVERY FEW MINUTES THINK HE KNEW PT AND INQUIRE ABOUT PT'S  OR MOTHER. WOULD REORIENT Pt BUT THEN A FEW SECONDS LATER WAS AGAIN CONFUSED. Pain Screening  Patient Currently in Pain: Denies          Orientation  Orientation  Overall Orientation Status: Impaired  Orientation Level: Oriented to person  Social/Functional History  Social/Functional History  Additional Comments: PER CHART Pt WAS IND AT [facility].   Cognition        Objective          AROM RLE (degrees)  RLE AROM: WFL  AROM LLE (degrees)  LLE AROM : WFL  Strength RLE  Strength RLE: WFL  Strength LLE  Strength LLE: WFL  Tone RLE  RLE Tone: Normotonic  Tone LLE  LLE Tone: Normotonic  Motor Control  Gross Motor?: WFL     Bed mobility  Supine to Sit: Stand by assistance  Sit to Supine: Stand by assistance  Transfers  Sit to Stand: Stand by assistance  Stand to sit: Stand by assistance  Ambulation  Ambulation?: Yes  Ambulation 1  Surface: level tile  Device: No Device  Assistance: Contact guard assistance;Stand by assistance  Quality of Gait: NO LOB OBSERVED. pT CAN BE MILDLLY IMPULSIVE  Distance: 40 FT  Comments: DID NOT PUSH Pt TO AMB FARTHER DUE TO RECENT AGITATION BUT FEEL LIKE pT HAD THE ENDURANCE TO GO FARTHER     Balance  Comments: Pt ABLE TO STAND FOR TOILETING AND HAND WASHING WITHOUT DIFFICULTY OR LOB        Plan   Plan  Times per week: 5-7  Plan weeks: ANTICIPATE SHORT TERM  Current Treatment Recommendations: Cognitive/Perceptual Training, Functional Mobility Training, Transfer Training, Gait Training, Patient/Caregiver Education & Training  Plan Comment: CONTINUE TO ASSESS SAFETY WITH MOBILITY  Safety Devices  Type of devices: Call light within reach, Gait belt, Left in chair, Nurse notified(RN NOTIFIED THAT  A PERSONAL ALARM WAS NOT AVAILABLE)    G-Code       OutComes Score                                                  AM-PAC Score             Goals  Short term goals  Time Frame for Short term goals: 2 WKS  Short term goal 1: AMB 250 FT WITH SUPERVISION       Therapy Time   Individual Concurrent Group Co-treatment   Time In           Time Out           Minutes                   Sara Yang PT    Electronically signed by Sara Yang PT on 10/23/2020 at 12:03 PM

## 2020-10-23 NOTE — PROGRESS NOTES
Occupational Therapy   Occupational Therapy Initial Assessment  Date: 10/23/2020   Patient Name: Vasile Steve  MRN: [id number]     : 1935    Date of Service: 10/23/2020    Discharge Recommendations:  Patient would benefit from continued therapy after discharge       Assessment   Assessment: Evaluation completed and tx initiated. Confusion is the primary limiting factor in ability to function safely. Not appropriate to return to assisted living unless he has significant cognitive clearing  Treatment Diagnosis: Right parenchymal hemorrhage, Right side frontal hematoma  REQUIRES OT FOLLOW UP: Yes  Activity Tolerance  Activity Tolerance: Patient Tolerated treatment well  Safety Devices  Safety Devices in place: Not Applicable(left with nursing)           Patient Diagnosis(es): The primary encounter diagnosis was Fall from standing, initial encounter. 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 has a past medical history of Allergic rhinitis, BPH (benign prostatic hyperplasia), Cholelithiases, Colon cancer ([address] Colon polyp, GERD (gastroesophageal reflux disease), Hearing loss, Hyperlipidemia, Hypertension, Palliative care patient, and Spastic colon. has a past surgical history that includes Appendectomy; Colon surgery; Rectal surgery; and Cholecystectomy, laparoscopic (12).     Treatment Diagnosis: Right parenchymal hemorrhage, Right side frontal hematoma      Restrictions  Restrictions/Precautions  Restrictions/Precautions: Fall Risk    Subjective   General  Chart Reviewed: Yes  Patient assessed for rehabilitation services?: Yes  Family / Caregiver Present: No  Patient Currently in Pain: No  Vital Signs  Patient Currently in Pain: No  Social/Functional History  Social/Functional History  Type of Home: Assisted living  Additional Comments: PER CHART Pt WAS IND AT [facility]. Objective        Orientation  Overall Orientation Status: Impaired  Orientation Level: Oriented to person;Disoriented to place; Disoriented to time;Disoriented to situation     Balance  Sitting Balance: Independent  Standing Balance: Contact guard assistance  Functional Mobility  Assist Level: Contact guard assistance  Toilet Transfers  Toilet Transfer: Contact guard assistance  ADL  Feeding: Contact guard assistance  Grooming: Contact guard assistance  UE Bathing: Contact guard assistance  LE Bathing: Minimal assistance  UE Dressing: Supervision  LE Dressing: Minimal assistance  Toileting: Minimal assistance           Transfers  Stand Step Transfers: Contact guard assistance     Cognition  Cognition Comment: Confused with waxing and waning ability to be redirected.   Required convincing as to location of bathroom                 LUE PROM (degrees)  LUE PROM: WFL  LUE AROM (degrees)  LUE AROM : WFL  RUE AROM (degrees)  RUE AROM : WFL  Right Hand PROM (degrees)  Right Hand PROM: WFL                    Tx initiated:  toileting (15 mins)    Plan   Plan  Times per week: 3-5    G-Code     OutComes Score                                                  AM-PAC Score             Goals  Short term goals  Short term goal 1: Supervision for dressing  Short term goal 2: Supervision for toileting  Short term goal 3: Supervision for bathing  Short term goal 4: Supervision for light ambulatory ADL       Therapy Time   Individual Concurrent Group Co-treatment   Time In           Time Out           Minutes                   Andrea Whitaker OT Electronically signed by Andrea Whitaker OT on 10/23/2020 at 2:00 PM

## 2020-10-23 NOTE — PROGRESS NOTES
Unable to complete admission questions as patient is confused Hx . Dementia . Lives at assisted living center here in [place] . Patient repeats same answers but does not answer questions when asked . Patient can be combative when attempting to get out of bed . Incontient of urine. Denies headache . When attempting to do pupil checks will close eyes tight . States \" Im not sure why you are here . \"

## 2020-10-23 NOTE — ED PROVIDER NOTES
[facility] EMERGENCY DEPT  EMERGENCY DEPARTMENT ENCOUNTER      Pt Name: Jamila Yadav  MRN: [id number]  [place] 1935  Date of evaluation: 10/22/2020  Provider: Stan An MD    CHIEF COMPLAINT       Chief Complaint   Patient presents with    Head Injury     arrived via EMS from assisted living, was outside, unwitnessed fall, has lg hematoma to right side of forehead, does not remember         HISTORY OF PRESENT ILLNESS   (Location/Symptom, Timing/Onset,Context/Setting, Quality, Duration, Modifying Factors, Severity)  Note limiting factors. Jamila Yadav is a 81 y.o. male who presents to the emergency department for evaluation after a fall. Patient has a history of dementia and previous CVA. Patient was found outside by assisted living staff with apparent head injury. HPI    NursingNotes were reviewed. REVIEW OF SYSTEMS    (2-9 systems for level 4, 10 or more for level 5)     Review of Systems   Unable to perform ROS: Dementia       A complete review of systems was performed and is negative except as noted above in the HPI.        PAST MEDICAL HISTORY     Past Medical History:   Diagnosis Date    Allergic rhinitis     BPH (benign prostatic hyperplasia)     Cholelithiases     Colon cancer (HCC)     Colon polyp     GERD (gastroesophageal reflux disease)     Hearing loss     Hyperlipidemia     Hypertension     Spastic colon          SURGICAL HISTORY       Past Surgical History:   Procedure Laterality Date    APPENDECTOMY      CHOLECYSTECTOMY, LAPAROSCOPIC  2/24/12    COLON SURGERY      colectomy    RECTAL SURGERY      fissure repair x2         CURRENT MEDICATIONS       Previous Medications    ASPIRIN 325 MG EC TABLET    Take 1 tablet by mouth daily    CHOLESTYRAMINE LIGHT 4 G PACKET    Take 1 packet by mouth daily    DEXTROMETHORPHAN-QUINIDINE (NUEDEXTA) 20-10 MG CAPS PER CAPSULE    TAKE ONE TABLET BY MOUTH 2 TIMES A DAY    DONEPEZIL (ARICEPT) 10 MG TABLET    Take one each am with breakfast    LISINOPRIL (PRINIVIL;ZESTRIL) 5 MG TABLET    Take 1 tablet by mouth daily    LOPERAMIDE (IMODIUM A-D) 2 MG TABLET    Take 1 tablet by mouth 3 times daily as needed    METOPROLOL SUCCINATE (TOPROL XL) 25 MG EXTENDED RELEASE TABLET    Take 25 mg by mouth 2 times daily    OMEPRAZOLE (PRILOSEC) 20 MG DELAYED RELEASE CAPSULE    Take 40 mg by mouth daily    QUETIAPINE (SEROQUEL) 25 MG TABLET    Take 1 tablet by mouth nightly as needed for Agitation    SILDENAFIL (REVATIO) 20 MG TABLET    Take 20 mg by mouth as needed (take 3 tablets by mouth daily as needed)       ALLERGIES     Amoxicillin-pot clavulanate    FAMILY HISTORY       Family History   Problem Relation Age of Onset    Heart Disease Mother     Cancer Father         liver          SOCIAL HISTORY       Social History     Socioeconomic History    Marital status:       Spouse name: None    Number of children: None    Years of education: None    Highest education level: None   Occupational History    None   Social Needs    Financial resource strain: None    Food insecurity     Worry: None     Inability: None    Transportation needs     Medical: None     Non-medical: None   Tobacco Use    Smoking status: Never Smoker    Smokeless tobacco: Never Used   Substance and Sexual Activity    Alcohol use: Yes     Comment: 4 per week    Drug use: Never    Sexual activity: None   Lifestyle    Physical activity     Days per week: None     Minutes per session: None    Stress: None   Relationships    Social connections     Talks on phone: None     Gets together: None     Attends Taoism service: None     Active member of club or organization: None     Attends meetings of clubs or organizations: None     Relationship status: None    Intimate partner violence     Fear of current or ex partner: None     Emotionally abused: None     Physically abused: None     Forced sexual activity: None   Other Topics Concern    None   Social History Narrative  None       SCREENINGS             PHYSICAL EXAM    (up to 7 for level 4, 8 or more for level 5)     ED Triage Vitals [10/22/20 1716]   BP Temp Temp src Pulse Resp SpO2 Height Weight   (!) 155/89 98.1 °F (36.7 °C) -- 71 14 95 % -- --       Physical Exam  Vitals signs and nursing note reviewed. Constitutional:       General: He is not in acute distress. Appearance: He is well-developed. He is not toxic-appearing or diaphoretic. HENT:      Head: Normocephalic. Comments: Contusion and hematoma of the left anterior forehead  Eyes:      General: No scleral icterus. Right eye: No discharge. Left eye: No discharge. Pupils: Pupils are equal, round, and reactive to light. Neck:      Musculoskeletal: Normal range of motion. Cardiovascular:      Rate and Rhythm: Normal rate and regular rhythm. Pulmonary:      Effort: Pulmonary effort is normal. No respiratory distress. Breath sounds: No stridor. Abdominal:      General: There is no distension. Musculoskeletal: Normal range of motion. General: No deformity. Skin:     General: Skin is warm and dry. Neurological:      Mental Status: He is alert and oriented to person, place, and time. GCS: GCS eye subscore is 4. GCS verbal subscore is 5. GCS motor subscore is 6. Cranial Nerves: No cranial nerve deficit. Motor: No abnormal muscle tone. Psychiatric:         Mood and Affect: Mood is anxious. Behavior: Behavior is uncooperative and agitated. Cognition and Memory: He exhibits impaired recent memory.          Judgment: Judgment normal.         DIAGNOSTIC RESULTS     EKG: All EKG's are interpreted by the Emergency Department Physician who either signs or Co-signs this chart in the absence of a cardiologist.      RADIOLOGY:   Non-plain film images such as CT, Ultrasound and MRI are read by the radiologist. Plainradiographic images are visualized and preliminarily interpreted by the emergency physician with the below findings:      Interpretation per the Radiologist below, if available at the time of this note:    XR CHEST PORTABLE   Final Result   1. No acute appearing infiltrate. Old granulomatous disease. 2. Cardiomegaly. No CHF. Signed by Dr Suzy Fuentes on 10/22/2020 6:42 PM      CT Cervical Spine WO Contrast   Final Result   1. No acute cervical spine fracture is identified. 2. Degenerative changes of the cervical spine, as described. The full report of this exam was immediately signed and available to   the emergency room. The patient is currently in the emergency room. Signed by Dr Suzy Fuentes on 10/22/2020 6:28 PM      CT Head WO Contrast   Final Result   1. There is a 1.2 cm parenchymal hemorrhage in the right   frontal-parietal region laterally with no significant mass effect or   sulcal effacement. 2. Mild to moderate atrophy with associated ventricular prominence. 3. Low-density in the hemispheric white matter is nonspecific and   likely due to chronic small vessel disease. 4. Extracranial hematoma in the right frontal region laterally. No   calvarial fracture. Results called to Alycia Moya physician assistant.    Signed by Dr Suzy Fuentes on 10/22/2020 6:17 PM      CT FACIAL BONES WO CONTRAST    (Results Pending)   CT HEAD WO CONTRAST    (Results Pending)         ED BEDSIDE ULTRASOUND:   Performed by ED Physician - none    LABS:  Labs Reviewed   CBC WITH AUTO DIFFERENTIAL - Abnormal; Notable for the following components:       Result Value    RBC 4.29 (*)     Hemoglobin 13.3 (*)     Hematocrit 40.5 (*)     MCV 94.4 (*)     MCHC 32.8 (*)     Neutrophils % 79.9 (*)     Lymphocytes % 9.5 (*)     Neutrophils Absolute 8.2 (*)     Lymphocytes Absolute 1.0 (*)     All other components within normal limits   COMPREHENSIVE METABOLIC PANEL W/ REFLEX TO MG FOR LOW K   PROTIME-INR   APTT   URINE RT REFLEX TO CULTURE   BASIC METABOLIC PANEL   HEMOGLOBIN A1C   LIPID PANEL   CBC All other labs were within normal range or not returned as of this dictation. EMERGENCY DEPARTMENT COURSE and DIFFERENTIALDIAGNOSIS/MDM:   Vitals:    Vitals:    10/22/20 1716 10/22/20 1800 10/22/20 1832   BP: (!) 155/89 (!) 152/66 139/89   Pulse: 71 80 74   Resp: 14 (!) 116 17   Temp: 98.1 °F (36.7 °C)     SpO2: 95% 95% 95%       MDM     Patient was agitated and uncooperative here. Given initial dose of Haldol for assistance in obtaining CT and other radiographic work-up. Patient had transient improvement with that but became agitated again trying to leave the emergency department and shoving nursing staff after returning. Patient was given additional dose of Haldol at that point. Case was discussed with neurosurgery and hospitalist for admission. CONSULTS:  IP CONSULT TO NEUROSURGERY  IP CONSULT TO PHARMACY  PHARMACY TO CHANGE BASE FLUIDS    PROCEDURES:  Unless otherwise notedbelow, none     Procedures  CRITICAL CARE TIME   Total Critical Care time was >30 minutes, excluding separately reportable procedures. There was a high probability of clinically significant/life threatening deterioration in the patient's condition which required my urgent intervention. FINAL IMPRESSION     1. Fall from standing, initial encounter    2. Intraparenchymal hematoma of brain, right, with loss of consciousness, initial encounter ([address]    3. Dementia with behavioral disturbance, unspecified dementia type ([address]    4. 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     DISCHARGE MEDICATIONS:  New Prescriptions    No medications on file          (Please note that portions of this note were completed with a voice recognition program.  Efforts were made to edit the dictations butoccasionally words are mis-transcribed.)    Jude Soto MD (electronically signed)  Emergency Physician          Jude Pickard MD  10/22/20 2029

## 2020-10-23 NOTE — PROGRESS NOTES
Speech Language Pathology  Facility/Department: [facility] SURG SERVICES  Initial Speech/Language/Cognitive Assessment  Bedside Swallow Evaluation     NAME: Yessica Tejeda  : 1935   MRN: [id number]  ADMISSION DATE: 10/22/2020  ADMITTING DIAGNOSIS: has S/P laparoscopic cholecystectomy; Ataxia involving legs; Acute CVA (cerebrovascular accident) ([facility] [address] Memory deficit; Hard of hearing; Dementia ([facility] [address] B12 deficiency; Essential hypertension; Traumatic hemorrhage of right cerebrum ([facility] [address] Benign prostatic hyperplasia with urinary obstruction; History of colon cancer; and Palliative care patient on their problem list.    Date of Eval: 10/23/2020   Evaluating Therapist: VANESSA Zhang    Assessment:  Completed assessment. Patient exhibited slow, decreased lingual movements during verbalizations. SLP ranked functional intelligibility for unfamiliar listeners at % in utterances with background noise present. Patient also exhibited slow processing, delayed and decreased select and sustained attention, delayed and decreased auditory comprehension of even simple questions and commands, delayed verbalizations with utterances frequently not pertaining to topics and questions at hand, decreased immediate/short-term memory, and impaired visual and verbal reasoning/problem solving. Also evaluated patient's swallowing function. Patient exhibits decreased oral prep of more solid consistencies, fast oral transit and suspected swallow delay with thin liquids, and sluggish, mild-moderately decreased laryngeal elevation for swallow airway protection. Even so, no outward S/S penetration/aspiration was noted with any ice chip trial, puree consistency trial, regular solid consistency trial, nectar thick liquid trial, or thin H2O trial administered during assessment this date. At this time, would trial bite sized consistency and nectar thick liquids. TOTAL FEED. Recommend meds crushed in pudding/applesauce.  If patient receives mouth care prior to intake, okay for ice chips IN BETWEEN MEALS for comfort. Subjective:  General  Chart Reviewed: Yes  Patient assessed for rehabilitation services?: Yes  Family / Caregiver Present: No    Objective:  Oral Motor:    Labial ROM: Decreased, on the right, during labial retraction and labial protrusion. Labial Strength: Decreased during labial compression. Labial Coordination: Slowed movements were noted. Lingual ROM: Decreased during lingual extension with tongue not achieving full point; decreased during lingual elevation without use of accessory jaw movement; adequate movements noted bilaterally. Lingual Strength: Decreased  Lingual Coordination: Slowed movements were noted    Auditory Comprehension  Comprehension:  (Delays were noted and significant break down was observed during simple yes/no questions regarding immediate environment and current state of being at independent level and with provision of repetitions. Delays were noted and significant break down was observed during simple 1 auditory commands independently and with provision of repetition of commands.)    Expression  Primary Mode of Expression:  (Confrontation naming of items in room was considered to be delayed. Structured responsive speech and responses in natural conversation were considered to be frequently delayed and impaired with utterances not pertaining to topics and questions at hand.)    Motor Speech:  (Patient exhibited slow, decreased lingual movements during verbalizations. SLP ranked functional intelligibility for unfamiliar listeners at % in utterances with background noise present.)    Overall Orientation Status:  (Patient demonstrated ability to verbalize name and birthday at independent level.  Patient did not demonstrate ability to verbalize any additional biographical, temporal, spatial, or situational information independently.)    Attention:  (Patient demonstrated decreased select and sustained attention during assessment. Significant agitation was noted with provision of redirections.)    Memory:  (Patient demonstrated decreased immediate memory with sequences of related words set up to 3 items with repetitions provided. Patient demonstrated decreased short-term/working memory with less than 1 minute time delay+distractions present during assessment.)    Problem Solving:  (Patient impaired visual and verbal reasoning/problem solving during assessment.)    Additional Assessments:  Assessed patient's swallowing function. With ice chip trials and regular solid consistency trials presented by SLP, patient exhibited slow oral prep with decreased rotary jaw movement noted. Oral transit of ice chip trials primarily measured 1-2 seconds in length. Oral transit of puree consistency trials and regular solid consistency, all presented by SLP, primarily measured 1-2 seconds in length and min oral cavity residue (with both consistencies) was observed post swallows; all oral cavity residue cleared from the mouth with additional dry swallows. Oral transit of nectar thick liquid trials, presented via cup by SLP, primarily measured 1-2 seconds in length. Patient exhibited fast oral transit of thin H2O trials presented via cup by SLP. Laryngeal elevation during swallow initiation was considered to be sluggish and mild-moderately decreased for swallow airway protection. Even so, no outward S/S penetration/aspiration was noted with any ice chip trial, puree consistency trial, regular solid consistency trial, nectar thick liquid trial, or thin H2O trial administered during evaluation this date. At this time, would trial bite sized consistency and nectar thick liquids. TOTAL FEED. Recommend meds crushed in pudding/applesauce. If patient receives mouth care prior to intake, okay for ice chips IN BETWEEN MEALS for comfort. Will continue to follow to monitor swallow and cognitive-linguistic functioning.

## 2020-10-23 NOTE — CONSULTS
Palliative Care initiated for support and goals of care. 80 y/o man who lives at [facility]" assisted living. He suffered a fall with head trauma. Pt has been confused and resistive to help. Currently he is sleeping in the chair. Report from nursing, pt had repeat CT of head today, neuro following. Spoke with Ayse Mota, who is reaching out to family to discuss NF placement. Pt is DNR. He has 2 daughters who are his decision makers. Palliative will follow POC.       Electronically signed by Mary Alice Pierce RN on 10/23/2020 at 1:18 PM

## 2020-10-23 NOTE — PROGRESS NOTES
[facility]    Patient:  Brittanie Vega  YOB: 1935  Date of Service: 10/23/2020  MRN: [id number]   Acct: [de-identified]   Primary Care Physician: Clinton Parson MD  Advance Directive: [facility]  Admit Date: 10/22/2020       Hospital Day: 1  Portions of this note have been copied forward, however, changed to reflect the most current clinical status of this patient. CHIEF COMPLAINT Head injury    SUBJECTIVE: Mr. Jyoti Ann has no new complaints today. He was calm though confused upon this PA-C evaluation. Cumulative Hospital Course: The patient is an 81 y.o. male who presented to [address] [place] [address] ED complaining of a fall outside his assisted living facility. Mr. Jyoti Ann was confused upon arrival and unable to provide history. This history was gathered from EHR/ED provider and caregiver present in room. Fall was unwitnessed. It is unknown if he fully lost consciousness. Patient does have significantly worse confusion than prior to the fall, although, his caregiver stated patient has been more confused than normal over the last 2 weeks and has stopped taking home medications. Patient was noted to have a large hematoma to the right side of his forehead. CT revealed a 1.2 cm parenchymal hemorrhage in the right frontal-parietal region with no significant mass effect. Neurosurgery was consulted and recommended repeat CT in AM and hold all NSAIDS, anticoagulants. CT Cervical spine and CXR unremarkable. Review of Systems:   14 point review of systems is negative except as specifically addressed above.     Objective:   VITALS:  /78   Pulse 68   Temp 98 °F (36.7 °C) (Temporal)   Resp 18   SpO2 92%   24HR INTAKE/OUTPUT:      Intake/Output Summary (Last 24 hours) at 10/23/2020 1337  Last data filed at 10/23/2020 0900  Gross per 24 hour   Intake 120 ml   Output 0 ml   Net 120 ml     General appearance: alert, appears stated age, cooperative and no distress, sitting comfortably in bedside chair   Head: Normocephalic, right upper frontal hematoma improving  Eyes: conjunctivae/corneas clear. PERRL, EOM's intact. Ears: normal external ears and nose, throat without exudate  Neck: no adenopathy, no carotid bruit, no JVD, supple, symmetrical, trachea midline   Lungs: clear throughout, no rales, wheezes or rhonchi   Heart: RRR, S1, S2 normal, no murmur  Abdomen:soft, non-tender; non-distended, normal bowel sounds no masses, no organomegaly  Extremities:No lower extremity edema,  No erythema, no tenderness to palpation  Skin: Skin color, texture, turgor normal. No rashes or lesions  Lymphatic: No palpable lymph node enlargment  Neurologic: Alert and oriented X self, normal strength and tone. No focal deficits  Psychiatric: Calm, confused     Medications:      ziprasidone  20 mg Intramuscular Once    cholestyramine light  4 g Oral Daily    dextromethorphan-quiNIDine  1 capsule Oral BID    donepezil  10 mg Oral Daily    metoprolol succinate  25 mg Oral BID    pantoprazole  40 mg Oral QAM AC    sodium chloride flush  10 mL Intravenous 2 times per day    rosuvastatin  40 mg Oral Nightly    QUEtiapine  100 mg Oral Once     loperamide, sodium chloride flush, acetaminophen, promethazine **OR** ondansetron, labetalol  DIET DYSPHAGIA SOFT AND BITE-SIZED; Mildly Thick (F[place]patrick)     Lab and other Data:     Recent Labs     10/22/20  1829 10/23/20  0233   WBC 10.2 8.3   HGB 13.3* 13.0*    248     Recent Labs     10/22/20  1829 10/23/20  0233    140   K 4.4 3.9    104   CO2 27 24   BUN 12 10   CREATININE 0.9 0.8   GLUCOSE 103 98     Recent Labs     10/22/20  1829   AST 22   ALT 15   BILITOT 0.6   ALKPHOS 40   INR:   Recent Labs     10/22/20  1829   INR 1.02   A1C:   Recent Labs     10/22/20  1829   LABA1C 5.5     RAD:   Ct Head Wo Contrast  1. There is a 1.2 cm parenchymal hemorrhage in the right frontal-parietal region laterally with no significant mass effect or sulcal effacement.  2. Mild to moderate atrophy with associated ventricular prominence. 3. Low-density in the hemispheric white matter is nonspecific and likely due to chronic small vessel disease. 4. Extracranial hematoma in the right frontal region laterally. No calvarial fracture. Results called to Cyndi Timmons physician assistant. Signed by Dr Pia Garcia on 10/22/2020 6:17 PM    Ct Cervical Spine Wo Contrast  1. No acute cervical spine fracture is identified. 2. Degenerative changes of the cervical spine, as described. The full report of this exam was immediately signed and available to the emergency room. The patient is currently in the emergency room. Signed by Dr Pia Garcia on 10/22/2020 6:28 PM    Xr Chest Portable  1. No acute appearing infiltrate. Old granulomatous disease. 2. Cardiomegaly. No CHF. Signed by Dr Pia Garcia on 10/22/2020 6:42 PM    Assessment/Plan   Principal Problem:    Traumatic 1.2 cm parenchymal hemorrhage right frontal-parietal region-Neurosurgery following, repeat CT pending. Continue to hold Aspirin, BP controlled    Active Problems:    Dementia (HCC)-suspect worsening given history of increasing confusion over 2 weeks prior to this hospitalization, PT/OT/SW.  Will likely need SNF placement      Essential hypertension-home meds resumed, monitor closely      Palliative care patient-noted    Further orders per clinical course/attending    DVT Prophylaxis:SCD  GI prophylaxis: Protonix    Jaya Kwan PA-C

## 2020-10-23 NOTE — ED NOTES
Patient out of bed walking around room, refusing to listen to staff. Patient upset at staff and aggressively moves arms when attempting to touch patient in any way. Multiple attempts to get patient to lay in bed with little success. After about 15 minutes patient willing to finally lay in bed.      Melva Jones RN  10/22/20 6959

## 2020-10-23 NOTE — CONSULTS
tablet 12.5 mg, 12.5 mg, Oral, Q6H PRN **OR** ondansetron (ZOFRAN) injection 4 mg, 4 mg, Intravenous, Q6H PRN, Pamela Closs, PA-C    rosuvastatin (CRESTOR) tablet 40 mg, 40 mg, Oral, Nightly, Pamela Closs, PA-C    labetalol (NORMODYNE;TRANDATE) injection 10 mg, 10 mg, Intravenous, Q10 Min PRN, Pamela Closs, PA-C    QUEtiapine (SEROQUEL) tablet 100 mg, 100 mg, Oral, Once, Rachna Kerns MD  Amoxicillin-pot clavulanate    Social History  Social History     Tobacco Use   Smoking Status Never Smoker   Smokeless Tobacco Never Used     Social History     Substance and Sexual Activity   Alcohol Use Yes    Comment: 4 per week         Family History   Problem Relation Age of Onset    Heart Disease Mother     Cancer Father         liver         REVIEW OF SYSTEMS:  Not totally reliable due to dementia/confusion  Neurological: No headache, no confusion    PHYSICAL EXAM:  Vitals:    10/23/20 0645   BP: 130/79   Pulse: 78   Resp: 16   Temp: 98.8 °F (37.1 °C)   SpO2: 94%       Constitutional: The patient appears well-developed and well-nourished. Eyes - conjunctiva normal.  Conjugate Gaze  Ear, nose, throat - No scars, masses, or lesions over external nose or ears, no atrophy of tongue  Neck-symmetric, no masses noted, no jugular vein distension  Respiration- chest wall appears symmetric, good expansion, normal effort without use of accessory muscles  Musculoskeletal - no significant wasting of muscles noted, no bony deformities  Extremities-no clubbing, cyanosis or edema  Skin - warm, dry, and intact. No rash, erythema, or pallor. + scalp hematoma right lateral frontal region  Psychiatric - mood, affect, and behavior appear normal.     Neurologic Examination  Awake, Alert and oriented x 1, only able to state correct year  Normal speech pattern, following commands  VERDUZCO well  PERRL, EOMI, CN II-XII grossly intact      DATA:  Nursing/pcp notes, imaging,labs and vitals reviewed.      PT,OT and/or speech notes reviewed    Lab Results   Component Value Date    WBC 8.3 10/23/2020    HGB 13.0 (L) 10/23/2020    HCT 39.0 (L) 10/23/2020    MCV 92.0 10/23/2020     10/23/2020     Lab Results   Component Value Date     10/23/2020    K 3.9 10/23/2020     10/23/2020    CO2 24 10/23/2020    BUN 10 10/23/2020    CREATININE 0.8 10/23/2020    GLUCOSE 98 10/23/2020    CALCIUM 9.2 10/23/2020    PROT 7.1 10/22/2020    LABALBU 4.2 10/22/2020    BILITOT 0.6 10/22/2020    ALKPHOS 40 10/22/2020    AST 22 10/22/2020    ALT 15 10/22/2020    LABGLOM >60 10/23/2020    GFRAA >59 10/23/2020     Lab Results   Component Value Date    INR 1.02 10/22/2020    INR 1.13 08/31/2019    PROTIME 13.3 10/22/2020    PROTIME 13.9 08/31/2019     EXAMINATION:  CT HEAD WO CONTRAST  10/22/2020 6:13 PM    HISTORY: The patient fell. Right frontal hematoma. TECHNIQUE: Multiple axial images were obtained through the brain    without contrast infusion. Multiplanar images were reconstructed. DLP: 769 mGy-cm. Automated exposure control was utilized. COMPARISON: No comparison study. FINDINGS: There is a small contusional hemorrhage in the right    frontal-parietal region on images 28 and 29 of series 5 laterally. There is no significant edema or mass effect related to the small    hemorrhage. There is no sulcal effacement. There is mild to moderate    atrophy. There is low-density in the hemispheric white matter. There    is an extracranial hematoma in the right frontal region measuring 4.6    cm in greatest dimension. No calvarial fracture is seen. The    visualized paranasal sinuses and mastoid air cells are clear.         Impression    1. There is a 1.2 cm parenchymal hemorrhage in the right    frontal-parietal region laterally with no significant mass effect or    sulcal effacement. 2. Mild to moderate atrophy with associated ventricular prominence. 3.  Low-density in the hemispheric white matter is nonspecific and    likely due to chronic small vessel disease. 4. Extracranial hematoma in the right frontal region laterally. No    calvarial fracture. Results called to Cyndi Timmons, physician assistant. Signed by Dr Pia Garcia on 10/22/2020 6:17 PM          IMPRESSION  81year old male with hx of dementia found down with evidence of head trauma. On ASA. Platelets and coags Normal.  CT head with small right anterior parietal 1.2 cm contusion    RECOMMENDATIONS:    Repeat CT head today. If stable he will likely be cleared for discharge from a neurosurgical standpoint. I recommend that he continue to hold his ASA for at least one week. Maintain SBP , currently controlled.           Javon Loaiza, DO

## 2020-10-23 NOTE — ED NOTES
Patient refuses to be on monitor, took all EKG leads and BP cuff off.       Harper Neville RN  10/22/20 2012

## 2020-10-23 NOTE — H&P
daily as needed    Historical Provider, MD   aspirin 325 MG EC tablet Take 1 tablet by mouth daily 9/6/19   Grace Greer PA-C   cholestyramine light 4 g packet Take 1 packet by mouth daily 9/6/19   Grace Greer PA-C   lisinopril (PRINIVIL;ZESTRIL) 5 MG tablet Take 1 tablet by mouth daily  Patient not taking: Reported on 10/28/2019 9/5/19   Grace Greer PA-C   QUEtiapine (SEROQUEL) 25 MG tablet Take 1 tablet by mouth nightly as needed for Agitation  Patient not taking: Reported on 10/28/2019 9/5/19   Grace Greer PA-C   sildenafil (REVATIO) 20 MG tablet Take 20 mg by mouth as needed (take 3 tablets by mouth daily as needed)    Historical Provider, MD   metoprolol succinate (TOPROL XL) 25 MG extended release tablet Take 25 mg by mouth 2 times daily    Historical Provider, MD   omeprazole (PRILOSEC) 20 MG delayed release capsule Take 40 mg by mouth daily    Historical Provider, MD     Allergies:    Amoxicillin-pot clavulanate    Social History:    The patient currently lives at an assisted living facility. Tobacco:   reports that he has never smoked. He has never used smokeless tobacco.  Alcohol:   reports current alcohol use. Illicit Drugs: denies    Family History:      Problem Relation Age of Onset    Heart Disease Mother     Cancer Father         liver     Review of Systems:   Unable to obtain full review of systems 2/2 patient confusion/agitation     Physical Examination:  BP (!) 155/89   Pulse 71   Temp 98.1 °F (36.7 °C)   Resp 14   SpO2 95%   General appearance: 81year old male, well nourished, no acute distress, hard of hearing  Head: Normocephalic, right upper frontal hematoma  Eyes: conjunctivae/corneas clear. PERRL, EOM's intact.    Ears: normal external ears and nose, throat without exudate  Neck: no adenopathy, no carotid bruit, no JVD, supple, symmetrical, trachea midline  Lungs: clear to auscultation bilaterally,no rales or wheezes   Heart: regular rate and rhythm, S1, S2 normal, no murmur  Abdomen:soft, non-tender; non-distended, normal bowel sounds no masses, no organomegaly  Extremities:No lower extremity edema,  No erythema, no tenderness to palpation  Skin: Skin color, texture, turgor normal. No rashes or lesions  Lymphatic: No palpable lymph node enlargment  Neurologic: Patient is disoriented, moving all extremities spontaneously and with purpose but unable to answer questions appropriately. He has no facial asymmetry or slurred speech   Psychiatric: Confused, agitated     Diagnostic Data:  CBC:  Recent Labs     10/22/20  1829   WBC 10.2   HGB 13.3*   HCT 40.5*        BMP:  Recent Labs     10/22/20  1829      K 4.4      CO2 27   BUN 12   CREATININE 0.9   CALCIUM 9.5     Recent Labs     10/22/20  1829   AST 22   ALT 15   BILITOT 0.6   ALKPHOS 40     Coag Panel:   Recent Labs     10/22/20  1829   INR 1.02   PROTIME 13.3   APTT 28.1     Ct Head Wo Contrast  1. There is a 1.2 cm parenchymal hemorrhage in the right frontal-parietal region laterally with no significant mass effect or sulcal effacement. 2. Mild to moderate atrophy with associated ventricular prominence. 3. Low-density in the hemispheric white matter is nonspecific and likely due to chronic small vessel disease. 4. Extracranial hematoma in the right frontal region laterally. No calvarial fracture. Results called to Lucila Gamez, physician assistant. Signed by Dr Vanita Morris on 10/22/2020 6:17 PM    Ct Cervical Spine Wo Contrast  1. No acute cervical spine fracture is identified. 2. Degenerative changes of the cervical spine, as described. The full report of this exam was immediately signed and available to the emergency room. The patient is currently in the emergency room. Signed by Dr Vanita Morris on 10/22/2020 6:28 PM    Xr Chest Portable  1. No acute appearing infiltrate. Old granulomatous disease. 2. Cardiomegaly. No CHF.  Signed by Dr Vanita Morris on 10/22/2020 6:42 PM    Assessment/Plan:  Principal

## 2020-10-23 NOTE — ED NOTES
Patient became combative with staff, verbally threatening and stating he will leave. Patient refusing to stay in bed and is walking around room. States he needs to use bathroom, offered patient urinal however patient declined. BSC to bedside with  at bedside.       Lindy Olseno, RN  10/22/20 2012

## 2020-10-24 VITALS
OXYGEN SATURATION: 90 % | RESPIRATION RATE: 16 BRPM | DIASTOLIC BLOOD PRESSURE: 89 MMHG | SYSTOLIC BLOOD PRESSURE: 149 MMHG | HEART RATE: 72 BPM | TEMPERATURE: 97.1 F

## 2020-10-24 PROCEDURE — 6370000000 HC RX 637 (ALT 250 FOR IP): Performed by: PHYSICIAN ASSISTANT

## 2020-10-24 PROCEDURE — 2580000003 HC RX 258: Performed by: PHYSICIAN ASSISTANT

## 2020-10-24 PROCEDURE — 99232 SBSQ HOSP IP/OBS MODERATE 35: CPT | Performed by: NEUROLOGICAL SURGERY

## 2020-10-24 RX ORDER — QUETIAPINE FUMARATE 25 MG/1
25 TABLET, FILM COATED ORAL NIGHTLY PRN
Status: DISCONTINUED | OUTPATIENT
Start: 2020-10-24 | End: 2020-10-24 | Stop reason: HOSPADM

## 2020-10-24 RX ADMIN — DEXTROMETHORPHAN HYDROBROMIDE AND QUINIDINE SULFATE 1 CAPSULE: 20; 10 CAPSULE, GELATIN COATED ORAL at 12:17

## 2020-10-24 RX ADMIN — METOPROLOL SUCCINATE 25 MG: 25 TABLET, EXTENDED RELEASE ORAL at 12:17

## 2020-10-24 RX ADMIN — CHOLESTYRAMINE 4 G: 4 POWDER, FOR SUSPENSION ORAL at 12:17

## 2020-10-24 RX ADMIN — Medication 10 ML: at 00:22

## 2020-10-24 RX ADMIN — Medication 10 ML: at 12:17

## 2020-10-24 RX ADMIN — PANTOPRAZOLE SODIUM 40 MG: 40 TABLET, DELAYED RELEASE ORAL at 07:21

## 2020-10-24 RX ADMIN — DONEPEZIL HYDROCHLORIDE 10 MG: 5 TABLET, FILM COATED ORAL at 12:17

## 2020-10-24 NOTE — PROGRESS NOTES
10/24/20 1108   General Comment   Comments No TX per NSG. Patient was out of restraints earlier became agitated NSG had to place back in restraints.   Nsg does not want patient disturbed   Physical Therapy  Electronically signed by Damari Tidwell PTA on 10/24/2020 at 11:12 AM

## 2020-10-24 NOTE — PROGRESS NOTES
Discharge instructions, prescriptions and follow up appointments reviewed with patient's daughter who verbalized understanding. Patient stable and daughter agreeable to discharge. Patient's daughter made aware of patient's intermittent agitation and she agreed to drive him back to [facility]. Daughter stated that patient will have 24/7 care there.

## 2020-10-24 NOTE — DISCHARGE INSTR - DIET

## 2020-10-24 NOTE — DISCHARGE SUMMARY
Ritu Beach  :  1935  MRN:  [id number]    Admit date:  10/22/2020  Discharge date:  10/24/2020    Discharging Physician: oRsalinda Munoz MD    Advance Directive: DNR-CC    Consults: Dr. Miguelito Wilson     Primary Care Physician:  Mia Hernandez MD    Discharge Diagnoses:    Principal Problem:    Traumatic hemorrhage of right cerebrum [facility])  Active Problems:    Dementia [facility])    Essential hypertension    Palliative care patient  Resolved Problems:    * No resolved hospital problems. *    Portions of this note have been copied forward, however, changed to reflect the most current clinical status of this patient. Hospital Course: The patient is an 81 y. o. male who presented to [facility] ED complaining of a fall outside his assisted living facility. Mr. Darryl Daugherty was confused upon arrival and unable to provide history. This history was gathered from EHR/ED provider and caregiver present in room. Fall was unwitnessed. It is unknown if he fully lost consciousness. Patient does have significantly worse confusion than prior to the fall, although, his caregiver stated patient has been more confused than normal over the last 2 weeks and has stopped taking home medications. Patient was noted to have a large hematoma to the right side of his forehead. CT revealed a 1.2 cm parenchymal hemorrhage in the right frontal-parietal region with no significant mass effect. Neurosurgery was consulted and recommended repeat CT in AM and hold all NSAIDS, anticoagulants. CT Cervical spine and CXR unremarkable. Repeat CT head unremarkable. Recommended to avoid Aspirin for a week until follow up with Neurosurgery. Mr. Darryl Daugherty has been agitated this admission. This was discussed with his daughter. Patient lives in assisted living and his daughter has now arranged from 24 hour care. He is medically stable for discharge.  His agitation was discussed in full with his daughter to include use of soft restraints this hospitalization due to agitation with nursing staff. She states understanding and wishes for him to discharge to his home environment with 24 hour care. Significant Diagnostic Studies:   Ct Head Wo Contrast  Slight decrease in conspicuity of small focus of hemorrhage in the RIGHT frontal lobe. No new intracranial finding. No mass effect or midline shift. RIGHT frontal soft tissue hematoma again noted. Signed by Dr Esther Rosario on 10/23/2020 3:37 PM    Ct Head Wo Contrast  1. There is a 1.2 cm parenchymal hemorrhage in the right frontal-parietal region laterally with no significant mass effect or sulcal effacement. 2. Mild to moderate atrophy with associated ventricular prominence. 3. Low-density in the hemispheric white matter is nonspecific and likely due to chronic small vessel disease. 4. Extracranial hematoma in the right frontal region laterally. No calvarial fracture. Results called to Juan Francisco Mane, physician assistant. Signed by Dr Cari Mario on 10/22/2020 6:17 PM    Ct Facial Bones Wo Contrast  A suspected nondisplaced fracture of the anterior nasal spine. There is marked asymmetrical  thinning of the lateral aspect of the roof of the right orbit with a small focal defect which may represent a normal variation or a nondisplaced fracture. If symptomatic then further follow-up may be obtained. The remaining bones are unremarkable. Signed by Dr Sylvia Kinney on 10/23/2020 3:46 PM    Ct Cervical Spine Wo Contrast  1. No acute cervical spine fracture is identified. 2. Degenerative changes of the cervical spine, as described. The full report of this exam was immediately signed and available to the emergency room. The patient is currently in the emergency room. Signed by Dr aCri Mario on 10/22/2020 6:28 PM    Xr Chest Portable  1. No acute appearing infiltrate. Old granulomatous disease. 2. Cardiomegaly. No CHF.  Signed by Dr Cari Mario on 10/22/2020 6:42 PM    Pertinent Labs:   CBC:   Recent Labs     10/22/20  3353 10/23/20  0233   WBC 10.2 8.3   HGB 13.3* 13.0*    248     BMP:    Recent Labs     10/22/20  1829 10/23/20  0233    140   K 4.4 3.9    104   CO2 27 24   BUN 12 10   CREATININE 0.9 0.8   GLUCOSE 103 98     INR:   Recent Labs     10/22/20  1829   INR 1.02     Physical Exam:  Vital Signs: BP (!) 149/89   Pulse 72   Temp 97.1 °F (36.2 °C) (Temporal)   Resp 16   SpO2 90%   General appearance:. Alert and Cooperative   HEENT: Normocephalic. Right frontal hematoma  Chest: clear to auscultation bilaterally without wheezes or rhonchi. Cardiac: Normal heart tones with regular rate and rhythm, S1, S2 normal. No murmurs, gallops, or rubs auscultated. Abdomen:soft, non-tender; non-distended normal bowel sounds no masses, no organomegaly. Extremities: No clubbing or cyanosis. No peripheral edema. Peripheral pulses palpable. Neurologic: Grossly intact.     Discharge Medications:       Yvan Mccormick   Home Medication Instructions OKS:[id number]    Printed on:10/24/20 4456   Medication Information                      cholestyramine light 4 g packet  Take 1 packet by mouth daily             dextromethorphan-quiNIDine (NUEDEXTA) 20-10 MG CAPS per capsule  TAKE ONE TABLET BY MOUTH 2 TIMES A DAY             donepezil (ARICEPT) 10 MG tablet  Take one each am with breakfast             lisinopril (PRINIVIL;ZESTRIL) 5 MG tablet  Take 1 tablet by mouth daily             loperamide (IMODIUM A-D) 2 MG tablet  Take 1 tablet by mouth 3 times daily as needed             metoprolol succinate (TOPROL XL) 25 MG extended release tablet  Take 25 mg by mouth 2 times daily             omeprazole (PRILOSEC) 20 MG delayed release capsule  Take 40 mg by mouth daily             QUEtiapine (SEROQUEL) 25 MG tablet  Take 1 tablet by mouth nightly as needed for Agitation             sildenafil (REVATIO) 20 MG tablet  Take 20 mg by mouth as needed (take 3 tablets by mouth daily as needed)               Discharge Instructions:

## 2020-10-24 NOTE — PROGRESS NOTES
[facility]  Progress Note    LAST 24 HRS:  Patient combative overnight - code violet called. Repeat CT yesterday stable/decrese in size of hemorrhage. CHIEF COMPLAINT:  Found down, head trauma    HISTORY OF PRESENT ILLNESS:      The patient is a 81 y.o. male with history of dementia on ASA who was found down outside his assisted living facility yesterday afternoon with evidence of right frontal head trauma. Due to his confused state, he is not a good historian. But he does deny HA or N/V.        CT head revealed right parietal contusion for which I was asked to evaluate. The patient does take anticoagulation medication.   ASA    Past Medical History:   Diagnosis Date    Allergic rhinitis     BPH (benign prostatic hyperplasia)     Cholelithiases     Colon cancer (HCC)     Colon polyp     GERD (gastroesophageal reflux disease)     Hearing loss     Hyperlipidemia     Hypertension     Palliative care patient 10/23/2020    Spastic colon        Past Surgical History:   Procedure Laterality Date    APPENDECTOMY      CHOLECYSTECTOMY, LAPAROSCOPIC  2/24/12    COLON SURGERY      colectomy    RECTAL SURGERY      fissure repair x2        Medications    Current Facility-Administered Medications:     ziprasidone (GEODON) injection 10 mg, 10 mg, Intramuscular, Q6H PRN, [place] Paci, PA-C, 10 mg at 10/23/20 2145    cholestyramine light packet 4 g, 4 g, Oral, Daily, [place] Paci, PA-C    dextromethorphan-quiNIDine (NUEDEXTA) 20-10 MG per capsule 1 capsule, 1 capsule, Oral, BID, [place] Paci, PA-C, 1 capsule at 10/23/20 2148    donepezil (ARICEPT) tablet 10 mg, 10 mg, Oral, Daily, Mooreland Paci, PA-C    loperamide (IMODIUM) capsule 2 mg, 2 mg, Oral, TID PRN, Mooreland Paci, PA-C    metoprolol succinate (TOPROL XL) extended release tablet 25 mg, 25 mg, Oral, BID, Mooreland Paci, PA-C, 25 mg at 10/23/20 2148    pantoprazole (PROTONIX) tablet 40 mg, 40 mg, Oral, QAM AC, Tea Varela PA-C, 40 mg at 10/24/20 2271    sodium chloride flush 0.9 % injection 10 mL, 10 mL, Intravenous, 2 times per day, Tea Varela PA-C, 10 mL at 10/24/20 0022    sodium chloride flush 0.9 % injection 10 mL, 10 mL, Intravenous, PRN, Tea Varela, PA-ARETHA    acetaminophen (TYLENOL) tablet 650 mg, 650 mg, Oral, Q4H PRN, Tea Varela, PADANIELLA    promethazine (PHENERGAN) tablet 12.5 mg, 12.5 mg, Oral, Q6H PRN **OR** ondansetron (ZOFRAN) injection 4 mg, 4 mg, Intravenous, Q6H PRN, Tea Varela, PA-ARETHA    rosuvastatin (CRESTOR) tablet 40 mg, 40 mg, Oral, Nightly, MIGUEL PardoARETHA, 40 mg at 10/23/20 2147    labetalol (NORMODYNE;TRANDATE) injection 10 mg, 10 mg, Intravenous, Q10 Min PRN, Tea Varela PA-ARETHA  Amoxicillin-pot clavulanate    Social History  Social History     Tobacco Use   Smoking Status Never Smoker   Smokeless Tobacco Never Used     Social History     Substance and Sexual Activity   Alcohol Use Yes    Comment: 4 per week         Family History   Problem Relation Age of Onset    Heart Disease Mother     Cancer Father         liver         REVIEW OF SYSTEMS:  Not totally reliable due to dementia/confusion  Neurological: No headache,     PHYSICAL EXAM:  Vitals:    10/24/20 0751   BP: (!) 151/81   Pulse: 69   Resp: 20   Temp: 97.7 °F (36.5 °C)   SpO2: 94%       Constitutional: The patient appears well-developed and well-nourished. Eyes - conjunctiva normal.  Conjugate Gaze  Ear, nose, throat - No scars, masses, or lesions over external nose or ears, no atrophy of tongue  Neck-symmetric, no masses noted, no jugular vein distension  Respiration- chest wall appears symmetric, good expansion, normal effort without use of accessory muscles  Musculoskeletal - no significant wasting of muscles noted, no bony deformities  Extremities-no clubbing, cyanosis or edema  Skin - warm, dry, and intact. No rash, erythema, or pallor.  + scalp hematoma right lateral frontal region  Psychiatric - mood, affect, and behavior appear normal.     Neurologic Examination  Awake, Alert and oriented x 1, only able to state correct year  Normal speech pattern, following commands  VERDUZCO well  PERRL, EOMI, CN II-XII grossly intact      DATA:  Nursing/pcp notes, imaging,labs and vitals reviewed. PT,OT and/or speech notes reviewed    Lab Results   Component Value Date    WBC 8.3 10/23/2020    HGB 13.0 (L) 10/23/2020    HCT 39.0 (L) 10/23/2020    MCV 92.0 10/23/2020     10/23/2020     Lab Results   Component Value Date     10/23/2020    K 3.9 10/23/2020     10/23/2020    CO2 24 10/23/2020    BUN 10 10/23/2020    CREATININE 0.8 10/23/2020    GLUCOSE 98 10/23/2020    CALCIUM 9.2 10/23/2020    PROT 7.1 10/22/2020    LABALBU 4.2 10/22/2020    BILITOT 0.6 10/22/2020    ALKPHOS 40 10/22/2020    AST 22 10/22/2020    ALT 15 10/22/2020    LABGLOM >60 10/23/2020    GFRAA >59 10/23/2020     Lab Results   Component Value Date    INR 1.02 10/22/2020    INR 1.13 08/31/2019    PROTIME 13.3 10/22/2020    PROTIME 13.9 08/31/2019     EXAMINATION:  CT HEAD WO CONTRAST  10/22/2020 6:13 PM    HISTORY: The patient fell. Right frontal hematoma. TECHNIQUE: Multiple axial images were obtained through the brain    without contrast infusion. Multiplanar images were reconstructed. DLP: 769 mGy-cm. Automated exposure control was utilized. COMPARISON: No comparison study. FINDINGS: There is a small contusional hemorrhage in the right    frontal-parietal region on images 28 and 29 of series 5 laterally. There is no significant edema or mass effect related to the small    hemorrhage. There is no sulcal effacement. There is mild to moderate    atrophy. There is low-density in the hemispheric white matter. There    is an extracranial hematoma in the right frontal region measuring 4.6    cm in greatest dimension. No calvarial fracture is seen.  The    visualized paranasal sinuses and mastoid air cells are clear.      contusion    RECOMMENDATIONS:    Repeat CT head stable/improved  Cleared for discharge from a neurosurgery standpoint  May follow up in our clinic in 1-2 weeks          Tato Schaffer DO

## 2020-10-24 NOTE — PROGRESS NOTES
Edward mccallum called patient became combative and biting and swinging at staff and almost fell out of bed, hospitalist at bedside and winifred [employer] and security here

## 2020-10-24 NOTE — PROGRESS NOTES
Patient took his seroquel and we will reassess after meds kick in  Electronically signed by Joe Dixon RN on 10/23/2020 at 8:05 PM

## 2020-10-29 ENCOUNTER — TELEPHONE (OUTPATIENT)
Dept: NEUROSURGERY | Age: 85
End: 2020-10-29

## 2020-10-29 NOTE — TELEPHONE ENCOUNTER
Called and left VM letting patient know when his follow up appointment is. Advised the patient to call the office if there are any questions or if he cannot make that date and time.

## 2020-11-05 NOTE — PROGRESS NOTES
Physician Progress Note      PATIENT:               Sheri Fabry  CSN #:                  [id number]  :                       1935  ADMIT DATE:       10/22/2020 5:11 PM  DISCH DATE:        10/24/2020 4:46 PM  RESPONDING  PROVIDER #:        JEREMI RAHMAN MD          QUERY TEXT:    Pt admitted with traumatic hemorrhage of the right cerebrum after a fall. Pt.   was noted by the ER provider to have \"Dementia with Behavioral Disturbance\". Pt noted to \"uncooperative and agitated behavior\" as documented by the ED   Provider. If possible, please document in the progress notes and discharge   summary if you are evaluating and / or treating any of the following: The medical record reflects the following:  Risk Factors: Repeated Falls, Head Injury, Baseline Dementia  Clinical Indicators: Agitated and uncooperative in the ED at the time of   admission. Noted to be anxious in the ED. Treatment: Was given Haldol, Ativan and Benadryl in the ED, then resumed home   medications for admission    Thank you in advance,    Vince Barrera RN-BSN  Clinical Documentation  [address]  [email or web address]  Options provided:  -- Dementia without behavioral disturbance  -- Dementia with behavioral disturbance  -- Other - I will add my own diagnosis  -- Disagree - Not applicable / Not valid  -- Disagree - Clinically unable to determine / Unknown  -- Refer to Clinical Documentation Reviewer    PROVIDER RESPONSE TEXT:    This patient has dementia with behavioral disturbances.     Query created by: Valentino Faster on 2020 9:23 AM      Electronically signed by:  Gage Han MD 2020 9:35 AM

## 2021-03-11 ENCOUNTER — HOSPITAL ENCOUNTER (EMERGENCY)
Age: 86
Discharge: HOME | End: 2021-03-11
Payer: MEDICARE

## 2021-03-11 VITALS
HEART RATE: 95 BPM | DIASTOLIC BLOOD PRESSURE: 90 MMHG | SYSTOLIC BLOOD PRESSURE: 154 MMHG | RESPIRATION RATE: 16 BRPM | TEMPERATURE: 98 F | OXYGEN SATURATION: 96 %

## 2021-03-11 DIAGNOSIS — R04.0 EPISTAXIS: Primary | ICD-10-CM

## 2021-03-11 PROCEDURE — 99282 EMERGENCY DEPT VISIT SF MDM: CPT

## 2021-03-11 PROCEDURE — 6370000000 HC RX 637 (ALT 250 FOR IP): Performed by: NURSE PRACTITIONER

## 2021-03-11 RX ORDER — LIDOCAINE HYDROCHLORIDE 20 MG/ML
JELLY TOPICAL ONCE
Status: DISCONTINUED | OUTPATIENT
Start: 2021-03-11 | End: 2021-03-11 | Stop reason: HOSPADM

## 2021-03-11 RX ORDER — OXYMETAZOLINE HYDROCHLORIDE 0.05 G/100ML
2 SPRAY NASAL ONCE
Status: COMPLETED | OUTPATIENT
Start: 2021-03-11 | End: 2021-03-11

## 2021-03-11 RX ADMIN — OXYMETAZOLINE HYDROCHLORIDE 2 SPRAY: 0.05 SPRAY NASAL at 16:43

## 2021-03-11 NOTE — ED TRIAGE NOTES
Patient from [facility] with history of dementia admitted with spontaneous epistaxis lasting for more than 30 minutes from right nare. Patient is holding pressure to nare.  Electronically signed by Solange Mejia RN on 3/11/2021 at 4:09 PM

## 2021-03-11 NOTE — ED PROVIDER NOTES
MOUTH 2 TIMES A DAY, Disp-60 capsule,R-11Normal      donepezil (ARICEPT) 10 MG tablet Take one each am with breakfast, Disp-30 tablet,R-11Normal      loperamide (IMODIUM A-D) 2 MG tablet Take 1 tablet by mouth 3 times daily as neededHistorical Med      cholestyramine light 4 g packet Take 1 packet by mouth daily, Disp-60 packet, R-3DC to SNF      lisinopril (PRINIVIL;ZESTRIL) 5 MG tablet Take 1 tablet by mouth daily, Disp-30 tablet, R-0DC to SNF      QUEtiapine (SEROQUEL) 25 MG tablet Take 1 tablet by mouth nightly as needed for Agitation, Disp-30 tablet, R-0DC to SNF      sildenafil (REVATIO) 20 MG tablet Take 20 mg by mouth as needed (take 3 tablets by mouth daily as needed)Historical Med      metoprolol succinate (TOPROL XL) 25 MG extended release tablet Take 25 mg by mouth 2 times dailyHistorical Med      omeprazole (PRILOSEC) 20 MG delayed release capsule Take 40 mg by mouth dailyHistorical Med             ALLERGIES     Amoxicillin-pot clavulanate    FAMILY HISTORY       Family History   Problem Relation Age of Onset    Heart Disease Mother     Cancer Father         liver          SOCIAL HISTORY       Social History     Socioeconomic History    Marital status:       Spouse name: Not on file    Number of children: Not on file    Years of education: Not on file    Highest education level: Not on file   Occupational History    Not on file   Social Needs    Financial resource strain: Not on file    Food insecurity     Worry: Not on file     Inability: Not on file    Transportation needs     Medical: Not on file     Non-medical: Not on file   Tobacco Use    Smoking status: Never Smoker    Smokeless tobacco: Never Used   Substance and Sexual Activity    Alcohol use: Yes     Comment: 4 per week    Drug use: Never    Sexual activity: Not on file   Lifestyle    Physical activity     Days per week: Not on file     Minutes per session: Not on file    Stress: Not on file   Relationships    Social connections     Talks on phone: Not on file     Gets together: Not on file     Attends Latter-day service: Not on file     Active member of club or organization: Not on file     Attends meetings of clubs or organizations: Not on file     Relationship status: Not on file    Intimate partner violence     Fear of current or ex partner: Not on file     Emotionally abused: Not on file     Physically abused: Not on file     Forced sexual activity: Not on file   Other Topics Concern    Not on file   Social History Narrative    Not on file       SCREENINGS      @BOBV([id number])@      PHYSICAL EXAM    (up to 7 for level 4, 8 or more for level 5)     ED Triage Vitals [03/11/21 1642]   BP Temp Temp Source Pulse Resp SpO2 Height Weight   (!) 154/90 98 °F (36.7 °C) Oral 95 16 96 % -- --       Physical Exam  Vitals signs and nursing note reviewed. Constitutional:       Appearance: He is well-developed. HENT:      Head: Normocephalic and atraumatic. Nose: No nasal deformity or signs of injury. Right Nostril: Epistaxis present. No foreign body, septal hematoma or occlusion. Eyes:      General: No scleral icterus. Right eye: No discharge. Left eye: No discharge. Neck:      Musculoskeletal: Normal range of motion and neck supple. Cardiovascular:      Rate and Rhythm: Normal rate. Pulmonary:      Effort: No respiratory distress. Neurological:      Mental Status: He is alert.    Psychiatric:         Behavior: Behavior normal.         DIAGNOSTIC RESULTS     EKG: All EKG's are interpreted by the Emergency Department Physician who either signs or Co-signsthis chart in the absence of a cardiologist.        RADIOLOGY:   Non-plain filmimages such as CT, Ultrasound and MRI are read by the radiologist. Plain radiographic images are visualized and preliminarily interpreted by the emergency physician with the below findings:      Interpretation per the Radiologist below, if available at the time of this note:    No orders to display         ED BEDSIDEULTRASOUND:   Performed by ED Physician -none    LABS:  Labs Reviewed - No data to display    All other labs were within normal range or not returned as of this dictation. EMERGENCY DEPARTMENT COURSE and DIFFERENTIALDIAGNOSIS/MDM:   Vitals:    Vitals:    03/11/21 1642   BP: (!) 154/90   Pulse: 95   Resp: 16   Temp: 98 °F (36.7 °C)   TempSrc: Oral   SpO2: 96%           MDM  Nosebleed slowed and stopped after afrin. Patient would not hold pressure. He has been placing his finger up his nose also.   Very anxious to go home      CONSULTS:  None    PROCEDURES:  Unless otherwise noted below, none     Procedures    FINAL IMPRESSION      1. Epistaxis        DISPOSITION/PLAN   DISPOSITION        PATIENT REFERRED TO:  Rhett Rolon MD  [phone]            DISCHARGE MEDICATIONS:  Discharge Medication List as of 3/11/2021  6:18 PM             (Please note that portions of this note were completed with a voice recognitionprogram.  Efforts were made to edit the dictations but occasionally words are mis-transcribed.)    RADHA Dawn (electronically signed)         RADHA Dawn  03/11/21 1943

## 2021-03-11 NOTE — ED NOTES
Bed: RME05  Expected date:   Expected time:   Means of arrival:   Comments:     James Silver RN  03/11/21 4382

## 2021-04-01 ENCOUNTER — HOSPITAL ENCOUNTER (INPATIENT)
Age: 86
LOS: 5 days | Discharge: NURSING, REHAB OR LONG-TERM CARE | DRG: 378 | End: 2021-04-06
Attending: STUDENT IN AN ORGANIZED HEALTH CARE EDUCATION/TRAINING PROGRAM | Admitting: INTERNAL MEDICINE
Payer: MEDICARE

## 2021-04-01 ENCOUNTER — APPOINTMENT (OUTPATIENT)
Dept: CT IMAGING | Age: 86
DRG: 378 | End: 2021-04-01
Payer: MEDICARE

## 2021-04-01 DIAGNOSIS — Z85.038 HISTORY OF COLON CANCER: ICD-10-CM

## 2021-04-01 DIAGNOSIS — K92.2 GI BLEED: ICD-10-CM

## 2021-04-01 DIAGNOSIS — I63.9 ACUTE CVA (CEREBROVASCULAR ACCIDENT) (HCC): ICD-10-CM

## 2021-04-01 DIAGNOSIS — D64.9 ANEMIA, UNSPECIFIED TYPE: Primary | ICD-10-CM

## 2021-04-01 DIAGNOSIS — R19.5 POSITIVE OCCULT STOOL BLOOD TEST: ICD-10-CM

## 2021-04-01 DIAGNOSIS — F03.91 DEMENTIA WITH BEHAVIORAL DISTURBANCE, UNSPECIFIED DEMENTIA TYPE: ICD-10-CM

## 2021-04-01 LAB
ABO/RH: NORMAL
ALBUMIN SERPL-MCNC: 4 G/DL (ref 3.5–5.2)
ALP BLD-CCNC: 36 U/L (ref 40–130)
ALT SERPL-CCNC: 29 U/L (ref 5–41)
ANION GAP SERPL CALCULATED.3IONS-SCNC: 13 MMOL/L (ref 7–19)
ANTIBODY SCREEN: NORMAL
APTT: 24.1 SEC (ref 26–36.2)
AST SERPL-CCNC: 99 U/L (ref 5–40)
BASOPHILS ABSOLUTE: 0 K/UL (ref 0–0.2)
BASOPHILS RELATIVE PERCENT: 0.1 % (ref 0–1)
BILIRUB SERPL-MCNC: 0.4 MG/DL (ref 0.2–1.2)
BLOOD BANK DISPENSE STATUS: NORMAL
BLOOD BANK PRODUCT CODE: NORMAL
BPU ID: NORMAL
BUN BLDV-MCNC: 29 MG/DL (ref 8–23)
CALCIUM SERPL-MCNC: 8.8 MG/DL (ref 8.8–10.2)
CHLORIDE BLD-SCNC: 100 MMOL/L (ref 98–111)
CO2: 21 MMOL/L (ref 22–29)
CREAT SERPL-MCNC: 1 MG/DL (ref 0.5–1.2)
DESCRIPTION BLOOD BANK: NORMAL
EOSINOPHILS ABSOLUTE: 0 K/UL (ref 0–0.6)
EOSINOPHILS RELATIVE PERCENT: 0 % (ref 0–5)
GFR AFRICAN AMERICAN: >59
GFR NON-AFRICAN AMERICAN: >60
GLUCOSE BLD-MCNC: 143 MG/DL (ref 74–109)
HCT VFR BLD CALC: 19.8 % (ref 42–52)
HEMOGLOBIN: 5.9 G/DL (ref 14–18)
IMMATURE GRANULOCYTES #: 0.1 K/UL
INR BLD: 1.15 (ref 0.88–1.18)
LYMPHOCYTES ABSOLUTE: 0.5 K/UL (ref 1.1–4.5)
LYMPHOCYTES RELATIVE PERCENT: 4.3 % (ref 20–40)
MCH RBC QN AUTO: 27.6 PG (ref 27–31)
MCHC RBC AUTO-ENTMCNC: 29.8 G/DL (ref 33–37)
MCV RBC AUTO: 92.5 FL (ref 80–94)
MONOCYTES ABSOLUTE: 0.6 K/UL (ref 0–0.9)
MONOCYTES RELATIVE PERCENT: 5 % (ref 0–10)
NEUTROPHILS ABSOLUTE: 11.2 K/UL (ref 1.5–7.5)
NEUTROPHILS RELATIVE PERCENT: 90.1 % (ref 50–65)
PDW BLD-RTO: 13.4 % (ref 11.5–14.5)
PLATELET # BLD: 339 K/UL (ref 130–400)
PMV BLD AUTO: 9.9 FL (ref 9.4–12.4)
POTASSIUM REFLEX MAGNESIUM: 4 MMOL/L (ref 3.5–5)
PROTHROMBIN TIME: 14.7 SEC (ref 12–14.6)
RBC # BLD: 2.14 M/UL (ref 4.7–6.1)
SARS-COV-2, NAAT: NOT DETECTED
SODIUM BLD-SCNC: 134 MMOL/L (ref 136–145)
TOTAL PROTEIN: 6.4 G/DL (ref 6.6–8.7)
WBC # BLD: 12.4 K/UL (ref 4.8–10.8)

## 2021-04-01 PROCEDURE — 1210000000 HC MED SURG R&B

## 2021-04-01 PROCEDURE — 86923 COMPATIBILITY TEST ELECTRIC: CPT

## 2021-04-01 PROCEDURE — 6370000000 HC RX 637 (ALT 250 FOR IP): Performed by: STUDENT IN AN ORGANIZED HEALTH CARE EDUCATION/TRAINING PROGRAM

## 2021-04-01 PROCEDURE — 86900 BLOOD TYPING SEROLOGIC ABO: CPT

## 2021-04-01 PROCEDURE — 80053 COMPREHEN METABOLIC PANEL: CPT

## 2021-04-01 PROCEDURE — P9016 RBC LEUKOCYTES REDUCED: HCPCS

## 2021-04-01 PROCEDURE — 86901 BLOOD TYPING SEROLOGIC RH(D): CPT

## 2021-04-01 PROCEDURE — 85730 THROMBOPLASTIN TIME PARTIAL: CPT

## 2021-04-01 PROCEDURE — 2580000003 HC RX 258: Performed by: NURSE PRACTITIONER

## 2021-04-01 PROCEDURE — 2580000003 HC RX 258: Performed by: STUDENT IN AN ORGANIZED HEALTH CARE EDUCATION/TRAINING PROGRAM

## 2021-04-01 PROCEDURE — 85610 PROTHROMBIN TIME: CPT

## 2021-04-01 PROCEDURE — 85025 COMPLETE CBC W/AUTO DIFF WBC: CPT

## 2021-04-01 PROCEDURE — 36415 COLL VENOUS BLD VENIPUNCTURE: CPT

## 2021-04-01 PROCEDURE — 70450 CT HEAD/BRAIN W/O DYE: CPT

## 2021-04-01 PROCEDURE — 6360000002 HC RX W HCPCS: Performed by: NURSE PRACTITIONER

## 2021-04-01 PROCEDURE — C9113 INJ PANTOPRAZOLE SODIUM, VIA: HCPCS | Performed by: NURSE PRACTITIONER

## 2021-04-01 PROCEDURE — 99284 EMERGENCY DEPT VISIT MOD MDM: CPT

## 2021-04-01 PROCEDURE — 86850 RBC ANTIBODY SCREEN: CPT

## 2021-04-01 PROCEDURE — 87635 SARS-COV-2 COVID-19 AMP PRB: CPT

## 2021-04-01 RX ORDER — ACETAMINOPHEN 650 MG/1
650 SUPPOSITORY RECTAL EVERY 4 HOURS PRN
Status: DISCONTINUED | OUTPATIENT
Start: 2021-04-01 | End: 2021-04-06 | Stop reason: HOSPADM

## 2021-04-01 RX ORDER — SODIUM CHLORIDE 9 MG/ML
10 INJECTION INTRAVENOUS DAILY
Status: DISCONTINUED | OUTPATIENT
Start: 2021-04-04 | End: 2021-04-01

## 2021-04-01 RX ORDER — SODIUM CHLORIDE 9 MG/ML
10 INJECTION INTRAVENOUS DAILY
Status: DISCONTINUED | OUTPATIENT
Start: 2021-04-02 | End: 2021-04-01

## 2021-04-01 RX ORDER — QUETIAPINE FUMARATE 50 MG/1
25 TABLET, FILM COATED ORAL NIGHTLY PRN
Status: DISCONTINUED | OUTPATIENT
Start: 2021-04-01 | End: 2021-04-06 | Stop reason: HOSPADM

## 2021-04-01 RX ORDER — SODIUM CHLORIDE 9 MG/ML
25 INJECTION, SOLUTION INTRAVENOUS PRN
Status: DISCONTINUED | OUTPATIENT
Start: 2021-04-01 | End: 2021-04-06 | Stop reason: HOSPADM

## 2021-04-01 RX ORDER — LISINOPRIL 5 MG/1
5 TABLET ORAL DAILY
Status: DISCONTINUED | OUTPATIENT
Start: 2021-04-02 | End: 2021-04-06 | Stop reason: HOSPADM

## 2021-04-01 RX ORDER — PANTOPRAZOLE SODIUM 40 MG/10ML
40 INJECTION, POWDER, LYOPHILIZED, FOR SOLUTION INTRAVENOUS DAILY
Status: DISCONTINUED | OUTPATIENT
Start: 2021-04-04 | End: 2021-04-01

## 2021-04-01 RX ORDER — ATORVASTATIN CALCIUM 80 MG/1
80 TABLET, FILM COATED ORAL NIGHTLY
Status: DISCONTINUED | OUTPATIENT
Start: 2021-04-01 | End: 2021-04-02

## 2021-04-01 RX ORDER — LOPERAMIDE HYDROCHLORIDE 2 MG/1
2 CAPSULE ORAL 3 TIMES DAILY PRN
Status: DISCONTINUED | OUTPATIENT
Start: 2021-04-01 | End: 2021-04-06 | Stop reason: HOSPADM

## 2021-04-01 RX ORDER — LABETALOL HYDROCHLORIDE 5 MG/ML
10 INJECTION, SOLUTION INTRAVENOUS EVERY 10 MIN PRN
Status: DISCONTINUED | OUTPATIENT
Start: 2021-04-01 | End: 2021-04-06 | Stop reason: HOSPADM

## 2021-04-01 RX ORDER — PANTOPRAZOLE SODIUM 40 MG/10ML
40 INJECTION, POWDER, LYOPHILIZED, FOR SOLUTION INTRAVENOUS DAILY
Status: DISCONTINUED | OUTPATIENT
Start: 2021-04-02 | End: 2021-04-01

## 2021-04-01 RX ORDER — SODIUM CHLORIDE 9 MG/ML
INJECTION, SOLUTION INTRAVENOUS PRN
Status: DISCONTINUED | OUTPATIENT
Start: 2021-04-01 | End: 2021-04-06 | Stop reason: HOSPADM

## 2021-04-01 RX ORDER — METOPROLOL SUCCINATE 25 MG/1
25 TABLET, EXTENDED RELEASE ORAL 2 TIMES DAILY
Status: DISCONTINUED | OUTPATIENT
Start: 2021-04-01 | End: 2021-04-06 | Stop reason: HOSPADM

## 2021-04-01 RX ORDER — DONEPEZIL HYDROCHLORIDE 5 MG/1
10 TABLET, FILM COATED ORAL DAILY
Status: DISCONTINUED | OUTPATIENT
Start: 2021-04-02 | End: 2021-04-06 | Stop reason: HOSPADM

## 2021-04-01 RX ORDER — SODIUM CHLORIDE 0.9 % (FLUSH) 0.9 %
10 SYRINGE (ML) INJECTION EVERY 12 HOURS SCHEDULED
Status: DISCONTINUED | OUTPATIENT
Start: 2021-04-01 | End: 2021-04-06 | Stop reason: HOSPADM

## 2021-04-01 RX ORDER — ACETAMINOPHEN 325 MG/1
650 TABLET ORAL EVERY 4 HOURS PRN
Status: DISCONTINUED | OUTPATIENT
Start: 2021-04-01 | End: 2021-04-06 | Stop reason: HOSPADM

## 2021-04-01 RX ORDER — SODIUM CHLORIDE 9 MG/ML
10 INJECTION INTRAVENOUS 2 TIMES DAILY
Status: DISCONTINUED | OUTPATIENT
Start: 2021-04-02 | End: 2021-04-06 | Stop reason: HOSPADM

## 2021-04-01 RX ORDER — PROMETHAZINE HYDROCHLORIDE 25 MG/1
12.5 TABLET ORAL EVERY 6 HOURS PRN
Status: DISCONTINUED | OUTPATIENT
Start: 2021-04-01 | End: 2021-04-06 | Stop reason: HOSPADM

## 2021-04-01 RX ORDER — CHOLESTYRAMINE LIGHT 4 G/5.7G
4 POWDER, FOR SUSPENSION ORAL DAILY
Status: DISCONTINUED | OUTPATIENT
Start: 2021-04-02 | End: 2021-04-06 | Stop reason: HOSPADM

## 2021-04-01 RX ORDER — PANTOPRAZOLE SODIUM 40 MG/10ML
40 INJECTION, POWDER, LYOPHILIZED, FOR SOLUTION INTRAVENOUS 2 TIMES DAILY
Status: DISCONTINUED | OUTPATIENT
Start: 2021-04-02 | End: 2021-04-06 | Stop reason: HOSPADM

## 2021-04-01 RX ORDER — SODIUM CHLORIDE 9 MG/ML
INJECTION, SOLUTION INTRAVENOUS CONTINUOUS
Status: DISCONTINUED | OUTPATIENT
Start: 2021-04-01 | End: 2021-04-06 | Stop reason: HOSPADM

## 2021-04-01 RX ORDER — SODIUM CHLORIDE 0.9 % (FLUSH) 0.9 %
10 SYRINGE (ML) INJECTION PRN
Status: DISCONTINUED | OUTPATIENT
Start: 2021-04-01 | End: 2021-04-06 | Stop reason: HOSPADM

## 2021-04-01 RX ORDER — ONDANSETRON 2 MG/ML
4 INJECTION INTRAMUSCULAR; INTRAVENOUS EVERY 6 HOURS PRN
Status: DISCONTINUED | OUTPATIENT
Start: 2021-04-01 | End: 2021-04-06 | Stop reason: HOSPADM

## 2021-04-01 RX ADMIN — SODIUM CHLORIDE: 9 INJECTION, SOLUTION INTRAVENOUS at 22:52

## 2021-04-01 RX ADMIN — ATORVASTATIN CALCIUM 80 MG: 80 TABLET, FILM COATED ORAL at 22:48

## 2021-04-01 RX ADMIN — PANTOPRAZOLE SODIUM 80 MG: 40 INJECTION, POWDER, FOR SOLUTION INTRAVENOUS at 22:48

## 2021-04-01 RX ADMIN — SODIUM CHLORIDE, PRESERVATIVE FREE 10 ML: 5 INJECTION INTRAVENOUS at 22:50

## 2021-04-01 RX ADMIN — QUETIAPINE FUMARATE 25 MG: 50 TABLET ORAL at 22:49

## 2021-04-01 NOTE — ED PROVIDER NOTES
[facility] 4 ONCOLOGY UNIT  eMERGENCY dEPARTMENT eNCOUnter      Pt Name: Hiram Newsome  MRN: [id number]  Gustavogfkatelin 1935  Date of evaluation: 4/1/2021  Provider: RADHA Tabares    CHIEF COMPLAINT       Chief Complaint   Patient presents with    Altered Mental Status     from [facility]. abnormal gate reported         HISTORY OF PRESENT ILLNESS   (Location/Symptom, Timing/Onset,Context/Setting, Quality, Duration, Modifying Factors, Severity)  Note limiting factors. Hiram Newsome is a 81 y.o. male who presents to the emergency department by ambulance from [facility]. They were concerned about an abnormal gait. Pt walks a lot. HAve not been able to talk to anyone at the [facility]. Pt has dementia and has no focal  deficits. Ambulatory without difficulty. BAseline dementia unchanged  Spoke to Pt's daughter Jourdan Morris [phone]        The history is provided by the patient and a relative. Neurologic Problem  Primary symptoms include loss of balance. This is a new problem. There was no focality noted. Pertinent negatives include no confusion and no headaches. Associated medical issues include dementia and CVA. NursingNotes were reviewed. REVIEW OF SYSTEMS    (2-9 systems for level 4, 10 or more for level 5)     Review of Systems   Unable to perform ROS: Dementia   Musculoskeletal: Positive for gait problem. Neurological: Positive for loss of balance. Negative for headaches. Psychiatric/Behavioral: Negative for confusion. Except as noted above the remainder of the review of systems was reviewed and negative.        PAST MEDICAL HISTORY     Past Medical History:   Diagnosis Date    Allergic rhinitis     BPH (benign prostatic hyperplasia)     Cholelithiases     Colon cancer (HCC)     Colon polyp     GERD (gastroesophageal reflux disease)     Hearing loss     Hyperlipidemia     Hypertension     Palliative care patient 10/23/2020    Spastic colon Smokeless tobacco: Never Used   Substance and Sexual Activity    Alcohol use: Yes     Comment: 4 per week    Drug use: Never    Sexual activity: None   Lifestyle    Physical activity     Days per week: None     Minutes per session: None    Stress: None   Relationships    Social connections     Talks on phone: None     Gets together: None     Attends Mu-ism service: None     Active member of club or organization: None     Attends meetings of clubs or organizations: None     Relationship status: None    Intimate partner violence     Fear of current or ex partner: None     Emotionally abused: None     Physically abused: None     Forced sexual activity: None   Other Topics Concern    None   Social History Narrative    None       SCREENINGS   NIH Stroke Scale  Interval: Baseline  Level of Consciousness (1a. ): Alert  LOC Questions (1b. ): Answers both correctly  LOC Commands (1c. ): Performs both tasks correctly  Best Gaze (2. ): Normal  Visual (3. ): No visual loss  Facial Palsy (4. ): Normal symmetrical movement  Motor Arm, Left (5a. ): No drift  Motor Arm, Right (5b. ): No drift  Motor Leg, Left (6a. ): No drift  Motor Leg, Right (6b. ): No drift  Sensory (8. ): Normal  Best Language (9. ): No aphasia  Dysarthria (10. ): Normal  Extinction and Inattention (11): No abnormalityGlasgow Coma Scale  Eye Opening: Spontaneous  Best Verbal Response: Confused(Baseline)  Best Motor Response: Obeys commands  Ting Coma Scale Score: 14 @FLOW([id number])@      PHYSICAL EXAM    (up to 7 for level 4, 8 or more for level 5)     ED Triage Vitals [04/01/21 1637]   BP Temp Temp src Pulse Resp SpO2 Height Weight   126/69 97.6 °F (36.4 °C) -- 90 18 98 % 5' 10\" (1.778 m) 199 lb (90.3 kg)       Physical Exam  Vitals signs and nursing note reviewed. Constitutional:       Appearance: He is well-developed. HENT:      Head: Normocephalic and atraumatic. Eyes:      General: No scleral icterus. Right eye: No discharge. Left eye: No discharge. Pupils: Pupils are equal, round, and reactive to light. Neck:      Musculoskeletal: Normal range of motion and neck supple. Cardiovascular:      Rate and Rhythm: Normal rate. Pulmonary:      Effort: No respiratory distress. Neurological:      Mental Status: He is alert. Mental status is at baseline. Cranial Nerves: Cranial nerves are intact. Sensory: Sensation is intact. Motor: Motor function is intact. Coordination: Coordination is intact. Gait: Gait is intact. Psychiatric:         Attention and Perception: Attention normal.         Mood and Affect: Mood normal.         Speech: Speech normal.         Behavior: Behavior normal. Behavior is cooperative. Cognition and Memory: He exhibits impaired recent memory. DIAGNOSTIC RESULTS     EKG: All EKG's are interpreted by the Emergency Department Physician who either signs or Co-signsthis chart in the absence of a cardiologist.        RADIOLOGY:   Non-plain filmimages such as CT, Ultrasound and MRI are read by the radiologist. Plain radiographic images are visualized and preliminarily interpreted by the emergency physician with the below findings:      Interpretation per the Radiologist below, if available at the time of this note:    CT HEAD WO CONTRAST   Final Result   1. There is a new 2.8 cm hypodensity cortical/subcortical hypodensity   in the anterior left frontal lobe, most suspicious for an acute   frontal lobe cortical infarct. Otherwise stable.    Signed by Dr Karina Cherry on 4/1/2021 6:38 PM      MRI BRAIN WO CONTRAST    (Results Pending)         ED BEDSIDEULTRASOUND:   Performed by ED Physician -none    LABS:  Labs Reviewed   CBC WITH AUTO DIFFERENTIAL - Abnormal; Notable for the following components:       Result Value    WBC 12.4 (*)     RBC 2.14 (*)     Hemoglobin 5.9 (*)     Hematocrit 19.8 (*)     MCHC 29.8 (*)     Neutrophils % 90.1 (*)     Lymphocytes % 4.3 (*) Neutrophils Absolute 11.2 (*)     Lymphocytes Absolute 0.5 (*)     All other components within normal limits    Narrative:     Sy Hernandez tel. ,  Hematology results called to and read back by baldomero peñaloza er, 04/01/2021 17:42,  by [address] [facility] [address] W/ REFLEX TO MG FOR LOW K - Abnormal; Notable for the following components:    Sodium 134 (*)     CO2 21 (*)     Glucose 143 (*)     BUN 29 (*)     Total Protein 6.4 (*)     Alkaline Phosphatase 36 (*)     AST 99 (*)     All other components within normal limits   PROTIME-INR - Abnormal; Notable for the following components:    Protime 14.7 (*)     All other components within normal limits   APTT - Abnormal; Notable for the following components:    aPTT 24.1 (*)     All other components within normal limits   COVID-19, RAPID   CBC   HEMOGLOBIN A1C   LIPID PANEL   BASIC METABOLIC PANEL W/ REFLEX TO MG FOR LOW K   URINE DRUG SCREEN   URINE RT REFLEX TO CULTURE   TYPE AND SCREEN   PREPARE RBC (CROSSMATCH)       All other labs were within normal range or not returned as of this dictation. EMERGENCY DEPARTMENT COURSE and DIFFERENTIALDIAGNOSIS/MDM:   Vitals:    Vitals:    04/01/21 1637 04/01/21 1856 04/01/21 2200   BP: 126/69 124/74 (!) 174/86   Pulse: 90 101 106   Resp: 18 17 20   Temp: 97.6 °F (36.4 °C) 97.4 °F (36.3 °C) 97.2 °F (36.2 °C)   TempSrc:   Temporal   SpO2: 98%  96%   Weight: 199 lb (90.3 kg)  184 lb 3.2 oz (83.6 kg)   Height: 5' 10\" (1.778 m)  5' 10\" (1.778 m)           MDM      CONSULTS:  IP CONSULT TO NEUROLOGY  IP CONSULT TO GI    PROCEDURES:  Unless otherwise noted below, none     Procedures    FINAL IMPRESSION      1. Anemia, unspecified type    2. Dementia with behavioral disturbance, unspecified dementia type ([facility] [address]    3. History of colon cancer    4. Positive occult stool blood test    5.  Acute CVA (cerebrovascular accident) [facility])        DISPOSITION/PLAN   DISPOSITION Admitted 04/01/2021 07:48:46 PM      PATIENT REFERRED TO:  No follow-up provider specified.     DISCHARGE MEDICATIONS:  Current Discharge Medication List             (Please note that portions of this note were completed with a voice recognitionprogram.  Efforts were made to edit the dictations but occasionally words are mis-transcribed.)    RADHA Medeiros (electronically signed)          RADHA Medeiros  04/01/21 6098

## 2021-04-02 ENCOUNTER — APPOINTMENT (OUTPATIENT)
Dept: MRI IMAGING | Age: 86
DRG: 378 | End: 2021-04-02
Payer: MEDICARE

## 2021-04-02 ENCOUNTER — TELEPHONE (OUTPATIENT)
Dept: INTERNAL MEDICINE | Facility: CLINIC | Age: 86
End: 2021-04-02

## 2021-04-02 PROBLEM — K92.2 GI BLEED: Status: ACTIVE | Noted: 2021-04-01

## 2021-04-02 LAB
AMPHETAMINE SCREEN, URINE: NEGATIVE
ANION GAP SERPL CALCULATED.3IONS-SCNC: 9 MMOL/L (ref 7–19)
BARBITURATE SCREEN URINE: NEGATIVE
BENZODIAZEPINE SCREEN, URINE: NEGATIVE
BILIRUBIN URINE: NEGATIVE
BLOOD BANK DISPENSE STATUS: NORMAL
BLOOD BANK PRODUCT CODE: NORMAL
BLOOD, URINE: NEGATIVE
BPU ID: NORMAL
BUN BLDV-MCNC: 23 MG/DL (ref 8–23)
CALCIUM SERPL-MCNC: 8.6 MG/DL (ref 8.8–10.2)
CANNABINOID SCREEN URINE: NEGATIVE
CHLORIDE BLD-SCNC: 102 MMOL/L (ref 98–111)
CHOLESTEROL, TOTAL: 129 MG/DL (ref 160–199)
CLARITY: CLEAR
CO2: 25 MMOL/L (ref 22–29)
COCAINE METABOLITE SCREEN URINE: NEGATIVE
COLOR: YELLOW
CREAT SERPL-MCNC: 0.9 MG/DL (ref 0.5–1.2)
DESCRIPTION BLOOD BANK: NORMAL
GFR AFRICAN AMERICAN: >59
GFR NON-AFRICAN AMERICAN: >60
GLUCOSE BLD-MCNC: 100 MG/DL (ref 70–99)
GLUCOSE BLD-MCNC: 107 MG/DL (ref 74–109)
GLUCOSE BLD-MCNC: 115 MG/DL (ref 70–99)
GLUCOSE BLD-MCNC: 116 MG/DL (ref 70–99)
GLUCOSE URINE: NEGATIVE MG/DL
HBA1C MFR BLD: 5.1 % (ref 4–6)
HCT VFR BLD CALC: 22.6 % (ref 42–52)
HCT VFR BLD CALC: 23.8 % (ref 42–52)
HCT VFR BLD CALC: 28.2 % (ref 42–52)
HDLC SERPL-MCNC: 59 MG/DL (ref 55–121)
HEMOGLOBIN: 6.9 G/DL (ref 14–18)
HEMOGLOBIN: 7.5 G/DL (ref 14–18)
HEMOGLOBIN: 8.7 G/DL (ref 14–18)
KETONES, URINE: ABNORMAL MG/DL
LDL CHOLESTEROL CALCULATED: 59 MG/DL
LEUKOCYTE ESTERASE, URINE: NEGATIVE
LV EF: 58 %
LVEF MODALITY: NORMAL
Lab: NORMAL
MCH RBC QN AUTO: 27.9 PG (ref 27–31)
MCH RBC QN AUTO: 28.2 PG (ref 27–31)
MCH RBC QN AUTO: 28.3 PG (ref 27–31)
MCHC RBC AUTO-ENTMCNC: 30.5 G/DL (ref 33–37)
MCHC RBC AUTO-ENTMCNC: 30.9 G/DL (ref 33–37)
MCHC RBC AUTO-ENTMCNC: 31.5 G/DL (ref 33–37)
MCV RBC AUTO: 89.8 FL (ref 80–94)
MCV RBC AUTO: 91.3 FL (ref 80–94)
MCV RBC AUTO: 91.5 FL (ref 80–94)
NITRITE, URINE: NEGATIVE
OPIATE SCREEN URINE: NEGATIVE
PDW BLD-RTO: 14 % (ref 11.5–14.5)
PDW BLD-RTO: 14.1 % (ref 11.5–14.5)
PDW BLD-RTO: 14.2 % (ref 11.5–14.5)
PERFORMED ON: ABNORMAL
PH UA: 5 (ref 5–8)
PLATELET # BLD: 260 K/UL (ref 130–400)
PLATELET # BLD: 322 K/UL (ref 130–400)
PLATELET # BLD: 342 K/UL (ref 130–400)
PMV BLD AUTO: 9.7 FL (ref 9.4–12.4)
PMV BLD AUTO: 9.8 FL (ref 9.4–12.4)
PMV BLD AUTO: 9.9 FL (ref 9.4–12.4)
POTASSIUM REFLEX MAGNESIUM: 3.6 MMOL/L (ref 3.5–5)
PROTEIN UA: ABNORMAL MG/DL
RBC # BLD: 2.47 M/UL (ref 4.7–6.1)
RBC # BLD: 2.65 M/UL (ref 4.7–6.1)
RBC # BLD: 3.09 M/UL (ref 4.7–6.1)
SODIUM BLD-SCNC: 136 MMOL/L (ref 136–145)
SPECIFIC GRAVITY UA: 1.02 (ref 1–1.03)
TRIGL SERPL-MCNC: 53 MG/DL (ref 0–149)
UROBILINOGEN, URINE: 0.2 E.U./DL
WBC # BLD: 10.1 K/UL (ref 4.8–10.8)
WBC # BLD: 11.5 K/UL (ref 4.8–10.8)
WBC # BLD: 12.3 K/UL (ref 4.8–10.8)

## 2021-04-02 PROCEDURE — 80048 BASIC METABOLIC PNL TOTAL CA: CPT

## 2021-04-02 PROCEDURE — 85027 COMPLETE CBC AUTOMATED: CPT

## 2021-04-02 PROCEDURE — 99223 1ST HOSP IP/OBS HIGH 75: CPT | Performed by: PSYCHIATRY & NEUROLOGY

## 2021-04-02 PROCEDURE — 93306 TTE W/DOPPLER COMPLETE: CPT

## 2021-04-02 PROCEDURE — 99222 1ST HOSP IP/OBS MODERATE 55: CPT | Performed by: INTERNAL MEDICINE

## 2021-04-02 PROCEDURE — 80061 LIPID PANEL: CPT

## 2021-04-02 PROCEDURE — 83036 HEMOGLOBIN GLYCOSYLATED A1C: CPT

## 2021-04-02 PROCEDURE — 36430 TRANSFUSION BLD/BLD COMPNT: CPT

## 2021-04-02 PROCEDURE — 6370000000 HC RX 637 (ALT 250 FOR IP): Performed by: INTERNAL MEDICINE

## 2021-04-02 PROCEDURE — 93880 EXTRACRANIAL BILAT STUDY: CPT

## 2021-04-02 PROCEDURE — 2580000003 HC RX 258: Performed by: STUDENT IN AN ORGANIZED HEALTH CARE EDUCATION/TRAINING PROGRAM

## 2021-04-02 PROCEDURE — 36415 COLL VENOUS BLD VENIPUNCTURE: CPT

## 2021-04-02 PROCEDURE — 70551 MRI BRAIN STEM W/O DYE: CPT

## 2021-04-02 PROCEDURE — 81003 URINALYSIS AUTO W/O SCOPE: CPT

## 2021-04-02 PROCEDURE — 97162 PT EVAL MOD COMPLEX 30 MIN: CPT

## 2021-04-02 PROCEDURE — C9113 INJ PANTOPRAZOLE SODIUM, VIA: HCPCS | Performed by: STUDENT IN AN ORGANIZED HEALTH CARE EDUCATION/TRAINING PROGRAM

## 2021-04-02 PROCEDURE — 1210000000 HC MED SURG R&B

## 2021-04-02 PROCEDURE — 6360000002 HC RX W HCPCS: Performed by: STUDENT IN AN ORGANIZED HEALTH CARE EDUCATION/TRAINING PROGRAM

## 2021-04-02 PROCEDURE — 6360000002 HC RX W HCPCS: Performed by: INTERNAL MEDICINE

## 2021-04-02 PROCEDURE — 82947 ASSAY GLUCOSE BLOOD QUANT: CPT

## 2021-04-02 PROCEDURE — 80307 DRUG TEST PRSMV CHEM ANLYZR: CPT

## 2021-04-02 PROCEDURE — 97116 GAIT TRAINING THERAPY: CPT

## 2021-04-02 RX ORDER — DEXTROSE MONOHYDRATE 50 MG/ML
100 INJECTION, SOLUTION INTRAVENOUS PRN
Status: DISCONTINUED | OUTPATIENT
Start: 2021-04-02 | End: 2021-04-06 | Stop reason: HOSPADM

## 2021-04-02 RX ORDER — ATORVASTATIN CALCIUM 40 MG/1
40 TABLET, FILM COATED ORAL NIGHTLY
Status: DISCONTINUED | OUTPATIENT
Start: 2021-04-02 | End: 2021-04-06 | Stop reason: HOSPADM

## 2021-04-02 RX ORDER — SODIUM CHLORIDE 9 MG/ML
INJECTION, SOLUTION INTRAVENOUS PRN
Status: DISCONTINUED | OUTPATIENT
Start: 2021-04-02 | End: 2021-04-06 | Stop reason: HOSPADM

## 2021-04-02 RX ORDER — POLYETHYLENE GLYCOL 3350 17 G/17G
152 POWDER, FOR SOLUTION ORAL ONCE
Status: COMPLETED | OUTPATIENT
Start: 2021-04-02 | End: 2021-04-02

## 2021-04-02 RX ORDER — NICOTINE POLACRILEX 4 MG
15 LOZENGE BUCCAL PRN
Status: DISCONTINUED | OUTPATIENT
Start: 2021-04-02 | End: 2021-04-06 | Stop reason: HOSPADM

## 2021-04-02 RX ORDER — DEXTROSE MONOHYDRATE 25 G/50ML
12.5 INJECTION, SOLUTION INTRAVENOUS PRN
Status: DISCONTINUED | OUTPATIENT
Start: 2021-04-02 | End: 2021-04-06 | Stop reason: HOSPADM

## 2021-04-02 RX ORDER — HALOPERIDOL 5 MG/ML
2 INJECTION INTRAMUSCULAR ONCE
Status: COMPLETED | OUTPATIENT
Start: 2021-04-02 | End: 2021-04-02

## 2021-04-02 RX ADMIN — POLYETHYLENE GLYCOL 3350 152 G: 17 POWDER, FOR SOLUTION ORAL at 17:29

## 2021-04-02 RX ADMIN — SODIUM CHLORIDE, PRESERVATIVE FREE 10 ML: 5 INJECTION INTRAVENOUS at 20:36

## 2021-04-02 RX ADMIN — HALOPERIDOL LACTATE 2 MG: 5 INJECTION, SOLUTION INTRAMUSCULAR at 16:02

## 2021-04-02 RX ADMIN — PANTOPRAZOLE SODIUM 40 MG: 40 INJECTION, POWDER, FOR SOLUTION INTRAVENOUS at 09:40

## 2021-04-02 RX ADMIN — PANTOPRAZOLE SODIUM 40 MG: 40 INJECTION, POWDER, FOR SOLUTION INTRAVENOUS at 20:36

## 2021-04-02 RX ADMIN — SODIUM CHLORIDE, PRESERVATIVE FREE 10 ML: 5 INJECTION INTRAVENOUS at 09:40

## 2021-04-02 RX ADMIN — ATORVASTATIN CALCIUM 40 MG: 40 TABLET, FILM COATED ORAL at 20:36

## 2021-04-02 RX ADMIN — BISACODYL 20 MG: 5 TABLET, COATED ORAL at 17:29

## 2021-04-02 ASSESSMENT — PAIN SCALES - GENERAL
PAINLEVEL_OUTOF10: 0
PAINLEVEL_OUTOF10: 0

## 2021-04-02 NOTE — CARE COORDINATION
Spoke with pt's dtr, Yuliaan Justo, re: [place] planning for pt. [facility] [place]. She stated that he was independent with ambulation, dressing, could meet most of his needs, however has had a decline within the last few months and becomes confused at times. She is on her way to this facility from [place] right now. Yuliana Kemp has discussed options with her sister, Gerson Negron. They are agreeable that pt may need further therapy. They prefer for pt to return back to [facility] at [place] if appropriate for pt and he is able to meet his own care needs. If pt needs further therapy, they are agreeable to rehab at [address] [place] [address] at the [facility] in [place], [place]. Dtr, Yuliana Kemp, lives in [place]. If pt requires more care, such as a long term facility, they would like pt to go to [facility] in [place] or [address] [place] [address] at the [facility] in [place]. Explained that PTOT has been ordered for pt and they will see him today, he also has tests multiple tests pending. Dtr can assess what she believes will be pt's car needs and make her decision. SW will meet with dtr when she gets here today.    Electronically signed by Becky Sanchez on 4/2/2021 at 10:00 AM

## 2021-04-02 NOTE — PROGRESS NOTES
Physician Progress Note      PATIENT:               Isidra Bower  CSN #:                  [id number]  :                       1935  ADMIT DATE:       2021 4:35 PM  [address] [place] DATE:  RESPONDING  PROVIDER #:        Vivi Jeffery MD          QUERY TEXT:    Pt admitted with CVA and has stool positive for OB. Admission Hgb 5.9,   transfused 1 unit PRBC. If possible, please document in progress notes and   discharge summary further specificity regarding the acuity and type of anemia:    The medical record reflects the following:  Risk Factors: History of colon cancer  Clinical Indicators: Stool positive for OB, H&H 5.9/19.8  6.9/22.6  Treatment: Serial CBC, transfuse 1 unit PRBC  Options provided:  -- Anemia due to acute blood loss  -- Anemia due to chronic blood loss  -- Anemia due to acute on chronic blood loss  -- Anemia due to iron deficiency  -- Other - I will add my own diagnosis  -- Disagree - Not applicable / Not valid  -- Disagree - Clinically unable to determine / Unknown  -- Refer to Clinical Documentation Reviewer    PROVIDER RESPONSE TEXT:    This patient has acute blood loss anemia.     Query created by: Gurvinder Sloan on 2021 9:59 AM      Electronically signed by:  Vivi Jeffery MD 2021 12:39 PM

## 2021-04-02 NOTE — PROGRESS NOTES
Daughter, Sandrine Sandhu, lives in [place]. She will be in to see Mr. Sandi Capps tomorrow. Brenna Shy, pt's other daughter, lives in [address] [place] [address]. Sandrine Sandhu states \"it may be time to make a change\" with Mr. Charles's care.  Electronically signed by Estella Simeon RN on 4/1/2021 at 9:47 PM

## 2021-04-02 NOTE — PROGRESS NOTES
[facility]        Hospitalist Progress Note  4/2/2021 11:37 AM  Subjective:   Admit Date: 4/1/2021  PCP: No primary care provider on file. Chief Complaint: AMS    Subjective: Patient seen and examined at bedside with sitter present. Denies chest pain shortness of breath. Wants to go back to sleep. Alert and oriented during my evaluation. Calm. [facility] History: 73-year-old male with a past medical history of dementia, recent previous traumatic brain hemorrhage, hypertension, history of colon cancer presenting to the hospital for worsening mental status and abnormal gait found to be anemic with FOBT+ stool with gastroenterology consulted from the ER. Patient also noted to have acute CVA noted on CT head while in the emergency department. Neurology and gastroenterology consulted. ROS: 14 point review of systems is negative except as specifically addressed above. DIET DYSPHAGIA MINCED AND MOIST;  Mildly Thick (Nectar)  No intake or output data in the 24 hours ending 04/02/21 1137  Medications:   sodium chloride      sodium chloride      dextrose      sodium chloride      sodium chloride      sodium chloride 75 mL/hr at 04/01/21 2252     Current Facility-Administered Medications   Medication Dose Route Frequency Provider Last Rate Last Admin    0.9 % sodium chloride infusion   Intravenous PRN Richard Youngblood MD        0.9 % sodium chloride infusion   Intravenous PRN Jacquelyn Robbins MD        atorvastatin (LIPITOR) tablet 40 mg  40 mg Oral Nightly Jacquelyn Robbins MD        insulin lispro (HUMALOG) injection vial 0-6 Units  0-6 Units Subcutaneous TID WC Jacquelyn Robbins MD        insulin lispro (HUMALOG) injection vial 0-3 Units  0-3 Units Subcutaneous Nightly Jacquelyn Robbins MD        glucose (GLUTOSE) 40 % oral gel 15 g  15 g Oral PRN Jacquelyn Robbins MD        dextrose 50 % IV solution  12.5 g Intravenous PRN Jacquelyn Robbins MD        glucagon (rDNA) injection 1 mg  1 mg Intramuscular PRN Jean Carlos Obrien MD        dextrose 5 % solution  100 mL/hr Intravenous PRN Jean Carlos Obrien MD        0.9 % sodium chloride infusion   Intravenous PRN RADHA Flores        [Held by provider] metoprolol succinate (TOPROL XL) extended release tablet 25 mg  25 mg Oral BID Debbie Vernon MD        [Held by provider] cholestyramine light packet 4 g  4 g Oral Daily Debbie Vernon MD        [Held by provider] lisinopril (PRINIVIL;ZESTRIL) tablet 5 mg  5 mg Oral Daily Debbie Vernon MD        QUEtiapine (SEROQUEL) tablet 25 mg  25 mg Oral Nightly PRN Debbie Vernon MD   25 mg at 04/01/21 5089    [Held by provider] loperamide (IMODIUM) capsule 2 mg  2 mg Oral TID PRN Debbie Vernon MD       Travonshilo [facility] by provider] donepezil (ARICEPT) tablet 10 mg  10 mg Oral Daily Debbie Vernon MD        [Held by provider] dextromethorphan-quiNIDine (NUEDEXTA) 20-10 MG per capsule 1 capsule  1 capsule Oral Daily Debbie Vernon MD        pantoprazole (PROTONIX) injection 40 mg  40 mg Intravenous BID Debbie Vernon MD   40 mg at 04/02/21 0940    And    sodium chloride (PF) 0.9 % injection 10 mL  10 mL Intravenous BID Debbie Vernon MD   10 mL at 04/02/21 0940    sodium chloride flush 0.9 % injection 10 mL  10 mL Intravenous 2 times per day Debbie Vernon MD   10 mL at 04/02/21 0940    sodium chloride flush 0.9 % injection 10 mL  10 mL Intravenous PRN Debbie Vernon MD        0.9 % sodium chloride infusion  25 mL Intravenous PRN Debbie Vernon MD        promethazine (PHENERGAN) tablet 12.5 mg  12.5 mg Oral Q6H PRN Debbie Vernon MD        Or    ondansetron [facility]) injection 4 mg  4 mg Intravenous Q6H PRN Debbie Vernon MD        0.9 % sodium chloride infusion   Intravenous Continuous Debbie Vernon MD 75 mL/hr at 04/01/21 2252 New Bag at 04/01/21 2252    acetaminophen (TYLENOL) tablet 650 mg  650 mg Oral Q4H PRN Debbie Vernon MD        Or    acetaminophen (TYLENOL) suppository 650 mg  650 mg Rectal Q4H PRN Graham Bond MD        labetalol (NORMODYNE;TRANDATE) injection 10 mg  10 mg Intravenous Q10 Min PRN Graham Bond MD            Labs:     Recent Labs     04/01/21 1647 04/02/21  0440   WBC 12.4* 11.5*   RBC 2.14* 2.47*   HGB 5.9* 6.9*   HCT 19.8* 22.6*   MCV 92.5 91.5   MCH 27.6 27.9   MCHC 29.8* 30.5*    322     Recent Labs     04/01/21 1647 04/02/21  0440   * 136   K 4.0 3.6   ANIONGAP 13 9    102   CO2 21* 25   BUN 29* 23   CREATININE 1.0 0.9   GLUCOSE 143* 107   CALCIUM 8.8 8.6*     No results for input(s): MG, PHOS in the last 72 hours. Recent Labs     04/01/21 1647   AST 99*   ALT 29   BILITOT 0.4   ALKPHOS 36*     ABGs:No results for input(s): PH, PO2, PCO2, HCO3, BE, O2SAT in the last 72 hours. Troponin T: No results for input(s): TROPONINI in the last 72 hours. INR:   Recent Labs     04/01/21 1647   INR 1.15     Lactic Acid: No results for input(s): LACTA in the last 72 hours. Objective:   Vitals: /64   Pulse 78   Temp 98.5 °F (36.9 °C) (Temporal)   Resp 18   Ht 5' 10\" (1.778 m)   Wt 184 lb 3.2 oz (83.6 kg)   SpO2 93%   BMI 26.43 kg/m²   24HR INTAKE/OUTPUT:  No intake or output data in the 24 hours ending 04/02/21 1137     General appearance: alert and cooperative with exam  HEENT: AT/NC  Lungs: no increased work of breathing, BLAE, no wheezing appreciated  Heart: RRR, no murmurs appreciated  Abdomen: BS+, soft, NT, ND  Extremities: no edema, pulses+  Neurologic: Alert, gross motor function intact  Skin: warm    Assessment and Plan: Active Problems:    Anemia requiring transfusions  Resolved Problems:    * No resolved hospital problems. *    GIB: PPI BID. GI consulted. Transfuse PRN. CVA/AMS/Dementia: Statin. Hold aspirin for GIB. Neurology consulted. MRI brain/TTE. Supportive management. PT/OT/SLP.     Advance Directive: DNR-CC    DVT prophylaxis: SCDs    Discharge planning: TBD      Signed:  Naty Chin MD 4/2/2021 11:37 AM  Rounding Hospitalist

## 2021-04-02 NOTE — H&P
Hospitalist: History and Physical    Date: 2021 Time: 7:50 PM    Name: Eliane Morel : 1935 MRN: [id number]    Code Status: Prior No additional code details    PCP: No primary care provider on file. Patient's Chief Complaint Is:    AMS      HPI: Patient is a 81 y.o. male with dementia who resides at [place] [address], hearing loss, history of traumatic brain hemorrhage for which he was hospitalized 10/2020, hypertension, history of colon cancer over 10 years ago, presents to the ED via EMS evening of  due to concern for worsening mental status from his baseline with more confusion and report of abnormal gait. Patient reportedly refusing all of his medications. Subsequent work-up in the ED significant for severe anemia with hemoglobin 5.9, significant drop from previous hemoglobin around 13 from 10/2020. Rectal exam the ED showed brown stool but FOBT positive. Patient has had recent epistaxis but no report of any hematemesis, coffee-ground emesis, hematochezia or melena. GI consulted from the ED will see in consultation. Patient typed and screened and transfused 1 unit PRBCs. CT head showed concern for acute left frontal infarct. Past Medical History:   Diagnosis Date    Allergic rhinitis     BPH (benign prostatic hyperplasia)     Cholelithiases     Colon cancer (HCC)     Colon polyp     GERD (gastroesophageal reflux disease)     Hearing loss     Hyperlipidemia     Hypertension     Palliative care patient 10/23/2020    Spastic colon      Past Surgical History:   Procedure Laterality Date    APPENDECTOMY      CHOLECYSTECTOMY, LAPAROSCOPIC  12    COLON SURGERY      colectomy    RECTAL SURGERY      fissure repair x2     Family History   Problem Relation Age of Onset    Heart Disease Mother     Cancer Father         liver     Social History     Socioeconomic History    Marital status:       Spouse name: Not on file    Number of children: Not on file    Years of education: Not on file    Highest education level: Not on file   Occupational History    Not on file   Social Needs    Financial resource strain: Not on file    Food insecurity     Worry: Not on file     Inability: Not on file    Transportation needs     Medical: Not on file     Non-medical: Not on file   Tobacco Use    Smoking status: Never Smoker    Smokeless tobacco: Never Used   Substance and Sexual Activity    Alcohol use: Yes     Comment: 4 per week    Drug use: Never    Sexual activity: Not on file   Lifestyle    Physical activity     Days per week: Not on file     Minutes per session: Not on file    Stress: Not on file   Relationships    Social connections     Talks on phone: Not on file     Gets together: Not on file     Attends Latter day service: Not on file     Active member of club or organization: Not on file     Attends meetings of clubs or organizations: Not on file     Relationship status: Not on file    Intimate partner violence     Fear of current or ex partner: Not on file     Emotionally abused: Not on file     Physically abused: Not on file     Forced sexual activity: Not on file   Other Topics Concern    Not on file   Social History Narrative    Not on file     Allergies   Allergen Reactions    Amoxicillin-Pot Clavulanate      Prior to Admission medications    Medication Sig Start Date End Date Taking?  Authorizing Provider   dextromethorphan-quiNIDine (NUEDEXTA) 20-10 MG CAPS per capsule TAKE ONE TABLET BY MOUTH 2 TIMES A DAY 5/5/20   Lay Avendaño MD   donepezil (ARICEPT) 10 MG tablet Take one each am with breakfast 11/27/19   Lay Avendaño MD   loperamide (IMODIUM A-D) 2 MG tablet Take 1 tablet by mouth 3 times daily as needed    Historical Provider, MD   cholestyramine light 4 g packet Take 1 packet by mouth daily 9/6/19   Mira Moe PA-C   lisinopril (PRINIVIL;ZESTRIL) 5 MG tablet Take 1 tablet by mouth daily 9/5/19   Mira Moe PA-C   QUEtiapine (SEROQUEL) 25 MG tablet Take 1 tablet by mouth nightly as needed for Agitation 9/5/19   Kalia Dacosta PA-C   sildenafil (REVATIO) 20 MG tablet Take 20 mg by mouth as needed (take 3 tablets by mouth daily as needed)    Historical Provider, MD   metoprolol succinate (TOPROL XL) 25 MG extended release tablet Take 25 mg by mouth 2 times daily    Historical Provider, MD   omeprazole (PRILOSEC) 20 MG delayed release capsule Take 40 mg by mouth daily    Historical Provider, MD DO have reviewed all pertinent history. Prior medical records and laboratory evaluation reviewed. Imaging independently reviewed. Review of Systems:   Unable to complete comprehensive ROS due to AMS    Physical Exam:  Vitals:    04/01/21 1856   BP: 124/74   Pulse: 101   Resp: 17   Temp: 97.4 °F (36.3 °C)   SpO2:      CONSTITUTIONAL: Chronically ill-appearing  PSYCH: Impaired judgment  EYES: Symmetrical, no scleral icterus  Head: Normocephalic/atraumatic  NECK: Trachea is midline. Thyroid is non-tender. LUNGS: Normal respiratory effort, no intercostal retractions or accessory muscle use. CARDIOVASCULAR: Regular rate and rhythm, peripheral pulses symmetric  GI/ABDOMEN: Soft, non-tender, non-distended, no guarding or rebound. Bowel sounds are normal.  SKIN: Warm and dry.    NEUROLOGICAL: Confused but no focal lateralizing weakness  EXTREMITIES: No clubbing or cyanosis    Labs:   Recent Results (from the past 72 hour(s))   CBC Auto Differential    Collection Time: 04/01/21  4:47 PM   Result Value Ref Range    WBC 12.4 (H) 4.8 - 10.8 K/uL    RBC 2.14 (L) 4.70 - 6.10 M/uL    Hemoglobin 5.9 (LL) 14.0 - 18.0 g/dL    Hematocrit 19.8 (LL) 42.0 - 52.0 %    MCV 92.5 80.0 - 94.0 fL    MCH 27.6 27.0 - 31.0 pg    MCHC 29.8 (L) 33.0 - 37.0 g/dL    RDW 13.4 11.5 - 14.5 %    Platelets 181 386 - 680 K/uL    MPV 9.9 9.4 - 12.4 fL    Neutrophils % 90.1 (H) 50.0 - 65.0 %    Lymphocytes % 4.3 (L) 20.0 - 40.0 %    Monocytes % 5.0 0.0 - 10.0 %    Eosinophils % 0.0 0.0 - 5.0 % Basophils % 0.1 0.0 - 1.0 %    Neutrophils Absolute 11.2 (H) 1.5 - 7.5 K/uL    Immature Granulocytes # 0.1 K/uL    Lymphocytes Absolute 0.5 (L) 1.1 - 4.5 K/uL    Monocytes Absolute 0.60 0.00 - 0.90 K/uL    Eosinophils Absolute 0.00 0.00 - 0.60 K/uL    Basophils Absolute 0.00 0.00 - 0.20 K/uL   Comprehensive Metabolic Panel w/ Reflex to MG    Collection Time: 04/01/21  4:47 PM   Result Value Ref Range    Sodium 134 (L) 136 - 145 mmol/L    Potassium reflex Magnesium 4.0 3.5 - 5.0 mmol/L    Chloride 100 98 - 111 mmol/L    CO2 21 (L) 22 - 29 mmol/L    Anion Gap 13 7 - 19 mmol/L    Glucose 143 (H) 74 - 109 mg/dL    BUN 29 (H) 8 - 23 mg/dL    CREATININE 1.0 0.5 - 1.2 mg/dL    GFR Non-African American >60 >60    GFR African American >59 >59    Calcium 8.8 8.8 - 10.2 mg/dL    Total Protein 6.4 (L) 6.6 - 8.7 g/dL    Albumin 4.0 3.5 - 5.2 g/dL    Total Bilirubin 0.4 0.2 - 1.2 mg/dL    Alkaline Phosphatase 36 (L) 40 - 130 U/L    ALT 29 5 - 41 U/L    AST 99 (H) 5 - 40 U/L   Protime-INR    Collection Time: 04/01/21  4:47 PM   Result Value Ref Range    Protime 14.7 (H) 12.0 - 14.6 sec    INR 1.15 0.88 - 1.18   APTT    Collection Time: 04/01/21  4:47 PM   Result Value Ref Range    aPTT 24.1 (L) 26.0 - 36.2 sec   TYPE AND SCREEN    Collection Time: 04/01/21  4:47 PM   Result Value Ref Range    ABO/Rh AB POS     Antibody Screen NEG    PREPARE RBC (CROSSMATCH), 1 Units    Collection Time: 04/01/21  4:47 PM   Result Value Ref Range    Product Code Blood Bank [id number]     Description Blood Bank Red Blood Cells, Leuko-reduced     Unit Number [id number]     Dispense Status Blood Bank issued    COVID-19, Rapid    Collection Time: 04/01/21  7:03 PM    Specimen: Nasopharyngeal Swab   Result Value Ref Range    SARS-CoV-2, NAAT Not Detected Not Detected       Imaging: Ct Head Wo Contrast    Result Date: 4/1/2021  CT HEAD WO CONTRAST 4/1/2021 5:02 PM HISTORY: Altered gait COMPARISON: CT scan dated 10/23/2020 DOSE LENGTH PRODUCT: 760 mGy

## 2021-04-02 NOTE — PROGRESS NOTES
Physical Therapy    Facility/Department: [facility] ONCOLOGY UNIT  Initial Assessment    NAME: Jewell Moffett  : 1935  MRN: [id number]    Date of Service: 2021    Discharge Recommendations:  Continue to assess pending progress, Patient would benefit from continued therapy after discharge, 24 hour supervision or assist        Assessment   Body structures, Functions, Activity limitations: Decreased functional mobility ; Decreased strength;Decreased cognition;Decreased safe awareness;Decreased endurance;Decreased posture;Decreased balance  Assessment: Pt. will benefit from cont. PT to increase mobility and safety. Pt. a fall risk and should not attempt mobility on his own at this time. Pt. limited by agitation, decreased safety awareness, fast cirilo and staggering gait. Treatment Diagnosis: impaired gait and mobility  Prognosis: Guarded  Decision Making: Medium Complexity  PT Education: General Safety;PT Role;Precautions  Barriers to Learning: agitation, dementia  REQUIRES PT FOLLOW UP: Yes  Activity Tolerance  Activity Tolerance: Patient limited by cognitive status;Treatment limited secondary to agitation  Activity Tolerance: once pt's agitation increased, got pt back into bed to increase safety       Patient Diagnosis(es): The primary encounter diagnosis was Anemia, unspecified type. Diagnoses of Dementia with behavioral disturbance, unspecified dementia type ([facility] [address] History of colon cancer, Positive occult stool blood test, and Acute CVA (cerebrovascular accident) [facility]) were also pertinent to this visit. has a past medical history of Allergic rhinitis, BPH (benign prostatic hyperplasia), Cholelithiases, Colon cancer ([facility] [address] Colon polyp, GERD (gastroesophageal reflux disease), Hearing loss, Hyperlipidemia, Hypertension, Palliative care patient, and Spastic colon. has a past surgical history that includes Appendectomy; Colon surgery;  Rectal surgery; and Cholecystectomy, laparoscopic (2/24/12). Restrictions  Restrictions/Precautions  Restrictions/Precautions: Fall Risk  Required Braces or Orthoses?: No  Vision/Hearing  Vision: Impaired  Vision Exceptions: Wears glasses for reading  Hearing: Exceptions to [facility]  Hearing Exceptions: Hard of hearing/hearing concerns(pt irritated that he cannot hear PT through the mask. Refuses to wear the mask.)     Subjective  General  Chart Reviewed: Yes  Patient assessed for rehabilitation services?: Yes  Response To Previous Treatment: Not applicable  Family / Caregiver Present: Yes(hospital sitter)  Referring Practitioner: Beto Regalado MD  Referral Date : 04/01/21  Diagnosis: anemia, dementia with behavioral disturbance, history of colon cancer, + occult stool for blood, acute CVA  Follows Commands: Impaired  Other (Comment): pt agitated and confused  General Comment  Comments: RN, Valentino Hair PT and states pt is going to be getting blood. Subjective  Subjective: Pt states he is upset and wants to get out of here. Reports he is having to go to the bathroom a lot. Pain Screening  Patient Currently in Pain: No  Pain Assessment  Pain Assessment: 0-10  Pain Level: 0  Non-Pharmaceutical Pain Intervention(s): Ambulation/Increased Activity;Repositioned  Vital Signs  Patient Currently in Pain: No  Pre Treatment Pain Screening  Intervention List: Patient able to continue with treatment    Orientation  Orientation  Overall Orientation Status: Impaired  Orientation Level: Disoriented to person;Oriented to place; Disoriented to situation;Disoriented to time  Social/Functional History  Social/Functional History  Type of Home: Assisted living([facility]  ADL Assistance: Independent  Ambulation Assistance: Independent  Transfer Assistance: Independent  Additional Comments: pt fully dressed and in the bathroom upon PT entry. Pt. agitated, but states he does not use AD.   Cognition   Cognition  Overall Cognitive Status: WFL    Objective Observation/Palpation  Posture: Fair  Observation: R hip elevated, pt fully dressed, RN going to give pt blood, so pt doffed shirt and donned gown. AROM RLE (degrees)  RLE AROM: WFL  AROM LLE (degrees)  LLE AROM : WFL  Strength RLE  Strength RLE: WFL  Comment: grossly 4/5  Strength LLE  Strength LLE: WFL  Comment: grossly 4/5        Bed mobility  Supine to Sit: Unable to assess  Sit to Supine: Stand by assistance  Scooting: Stand by assistance  Transfers  Stand to sit: Stand by assistance  Ambulation  Ambulation?: Yes  Ambulation 1  Surface: level tile  Device: No Device  Assistance: Stand by assistance  Quality of Gait: unsteady, fast pace, wide HARRIETT, side steps to regain balance  Gait Deviations: Staggers  Distance: 50'     Balance  Posture: Fair  Sitting - Static: Fair;+  Sitting - Dynamic: Fair;-  Standing - Static: Poor;+  Standing - Dynamic: Poor;+        Plan   Plan  Times per week: 3-7  Times per day: Daily  Plan weeks: 2  Current Treatment Recommendations: Balance Training, Functional Mobility Training, Transfer Training, Endurance Training, Gait Training, Safety Education & Training, Patient/Caregiver Education & Training, Equipment Evaluation, Education, & procurement  Plan Comment: cont. PT per POC.   Safety Devices  Type of devices: Bed alarm in place, Gait belt, Nurse notified, Call light within reach, Sitter present, Left in bed    G-Code       OutComes Score                                                  AM-PAC Score             Goals  Short term goals  Time Frame for Short term goals: 2 wks  Short term goal 1: supine to sit indep  Short term goal 2: sit to stand indep  Short term goal 3: amb. 100' indep, no LOB  Patient Goals   Patient goals : go home       Therapy Time   Individual Concurrent Group Co-treatment   Time In           Time Out           Minutes                   Dariana Khalil PT     Electronically signed by Dariana Khalil PT on 4/2/2021 at 11:54 AM

## 2021-04-02 NOTE — PROGRESS NOTES
PRELIMINARY REPORT    RCCA= 654/15             LCCA= 64/21  AFSHAN= 71/19                 LICA= 45/90  LESS THAN 50% NOTED IN BOTH ICA'S   BILATERAL ANTEGRADE VERTEBRAL ARTERIES    PENDING FINAL REPORT

## 2021-04-02 NOTE — PROGRESS NOTES
Spoke with daughter, Sj Mcfadden [phone], she is worried that her father has been experiencing depression and a decline in mental status. She reports that he wonders at night and refuses all medication. He has not taken any of his prescribed medications in 8 - 10 months. He had a fall last October and was admitted with a brain bleed that resolved on its own. She reports he was restrained and was given \"every medication ever listed\" on his last visit that made him delusional.  She also reports that he has been admitted twice in the last year with serious nose bleeds. Trang Burleson would like to be called with any updates on Mr. Jessica Amaya. She is his POA, along with her sister, Ed Mejía [phone].

## 2021-04-02 NOTE — CONSULTS
[address], [address]  [facility], [facility] [address]  Phone [phone]     Neurology Consultation     Date of Admission: 2021  4:35 PM  Date of Consultation: 21    Attending Provider: Corrina Singh MD  Consulting Provider: Sun Ash M.D. Patient: Mirtha Yarbrough  :  1935  Age:  81 y.o. MRN:  [id number]    CHIEF COMPLAINT:  Mental status change    History Source: History obtained from chart review and daughter. PCP: No primary care provider on file. HISTORY OF PRESENT ILLNESS:   Mirtha Yarbrough is a 81y.o. year old man with a history of stroke, cerebral hemorrhage, and epistaxi who was admitted yesterday with altered mental status, new gait difficulties, and anemia. He had a right frontal stroke in 2019 with left sided weakness and falls. After that it was noted he was developing dementia and Aricept was started. In fact the cognitive decline started prior to that stroke. He moved into assisted living. Since then, his memory has declined. He refuses to take his medications. He has refused physical therapy. He has had recurring falls. He fell in 10/2020 and hit his head sustaining a left frontal hemorrhage. He has moved back to the assisted living facility. He is sometimes irritable and refuses to follow directions and refuses to take his medications. He has had significant epistaxis. Yesterday he was noted to have a change in his gait and was referred to the ER where CT head showed an old left frontal infarct. Mr. Derick Goodwin has been intermittently agitated and confused. He is not sure why he is here and believes he is going to have some sort of surgery.       PAST MEDICAL HISTORY:    Medical History:      Diagnosis Date    Allergic rhinitis     BPH (benign prostatic hyperplasia)     Cholelithiases     Colon cancer (HCC)     Colon polyp     GERD (gastroesophageal reflux disease)     Hearing loss     Hyperlipidemia     Hypertension     Palliative care patient 10/23/2020    Spastic colon        Surgical History:      Procedure Laterality Date    APPENDECTOMY      CHOLECYSTECTOMY, LAPAROSCOPIC  2/24/12    COLON SURGERY      colectomy    RECTAL SURGERY      fissure repair x2       Medications Prior to Admission:    Prior to Admission medications    Medication Sig Start Date End Date Taking?  Authorizing Provider   dextromethorphan-quiNIDine (NUEDEXTA) 20-10 MG CAPS per capsule TAKE ONE TABLET BY MOUTH 2 TIMES A DAY 5/5/20   Christian Casas MD   donepezil (ARICEPT) 10 MG tablet Take one each am with breakfast 11/27/19   Christian Casas MD   loperamide (IMODIUM A-D) 2 MG tablet Take 1 tablet by mouth 3 times daily as needed    Historical Provider, MD   cholestyramine light 4 g packet Take 1 packet by mouth daily 9/6/19   Jenniffer Ramírez PA-C   lisinopril (PRINIVIL;ZESTRIL) 5 MG tablet Take 1 tablet by mouth daily 9/5/19   Jenniffer Ramírez PA-C   QUEtiapine (SEROQUEL) 25 MG tablet Take 1 tablet by mouth nightly as needed for Agitation 9/5/19   Jenniffer Ramírez PA-C   sildenafil (REVATIO) 20 MG tablet Take 20 mg by mouth as needed (take 3 tablets by mouth daily as needed)    Historical Provider, MD   metoprolol succinate (TOPROL XL) 25 MG extended release tablet Take 25 mg by mouth 2 times daily    Historical Provider, MD   omeprazole (PRILOSEC) 20 MG delayed release capsule Take 40 mg by mouth daily    Historical Provider, MD       Current Medications:  Current Facility-Administered Medications: 0.9 % sodium chloride infusion, , Intravenous, PRN  0.9 % sodium chloride infusion, , Intravenous, PRN  atorvastatin (LIPITOR) tablet 40 mg, 40 mg, Oral, Nightly  insulin lispro (HUMALOG) injection vial 0-6 Units, 0-6 Units, Subcutaneous, TID WC  insulin lispro (HUMALOG) injection vial 0-3 Units, 0-3 Units, Subcutaneous, Nightly  glucose (GLUTOSE) 40 % oral gel 15 g, 15 g, Oral, PRN  dextrose 50 % IV solution, 12.5 g, Intravenous, PRN  glucagon (rDNA) injection 1 mg, 1 mg, Intramuscular, PRN  dextrose 5 % solution, 100 mL/hr, Intravenous, PRN  haloperidol lactate (HALDOL) injection 2 mg, 2 mg, Intramuscular, Once  0.9 % sodium chloride infusion, , Intravenous, PRN  [Held by provider] metoprolol succinate (TOPROL XL) extended release tablet 25 mg, 25 mg, Oral, BID  [Held by provider] cholestyramine light packet 4 g, 4 g, Oral, Daily  [Held by provider] lisinopril (PRINIVIL;ZESTRIL) tablet 5 mg, 5 mg, Oral, Daily  QUEtiapine (SEROQUEL) tablet 25 mg, 25 mg, Oral, Nightly PRN  [Held by provider] loperamide (IMODIUM) capsule 2 mg, 2 mg, Oral, TID PRN  [Held by provider] donepezil (ARICEPT) tablet 10 mg, 10 mg, Oral, Daily  [Held by provider] dextromethorphan-quiNIDine (NUEDEXTA) 20-10 MG per capsule 1 capsule, 1 capsule, Oral, Daily  pantoprazole (PROTONIX) injection 40 mg, 40 mg, Intravenous, BID **AND** sodium chloride (PF) 0.9 % injection 10 mL, 10 mL, Intravenous, BID  sodium chloride flush 0.9 % injection 10 mL, 10 mL, Intravenous, 2 times per day  sodium chloride flush 0.9 % injection 10 mL, 10 mL, Intravenous, PRN  0.9 % sodium chloride infusion, 25 mL, Intravenous, PRN  promethazine (PHENERGAN) tablet 12.5 mg, 12.5 mg, Oral, Q6H PRN **OR** ondansetron (ZOFRAN) injection 4 mg, 4 mg, Intravenous, Q6H PRN  0.9 % sodium chloride infusion, , Intravenous, Continuous  acetaminophen (TYLENOL) tablet 650 mg, 650 mg, Oral, Q4H PRN **OR** acetaminophen (TYLENOL) suppository 650 mg, 650 mg, Rectal, Q4H PRN  labetalol (NORMODYNE;TRANDATE) injection 10 mg, 10 mg, Intravenous, Q10 Min PRN    Allergies:  Amoxicillin-pot clavulanate    Habits:  TOBACCO:   reports that he has never smoked. He has never used smokeless tobacco.  ETOH:   reports current alcohol use. DRUGS:    Social History     Substance and Sexual Activity   Drug Use Never       SOCIAL HISTORY:   Ayaan Farfan is , lives in an assisted living facility in [place], [place], and is retired.     FAMILY HISTORY: neck flexion: 5/5  Left neck flexion: 5/5  Right neck extension: 5/5  Left neck extension: 5/5  Right deltoid: 5/5  Left deltoid: 5/5  Right biceps: 5/5  Left biceps: 5/5  Right triceps: 5/5  Left triceps: 5/5  Right wrist flexion: 5/5  Left wrist flexion: 5/5  Right wrist extension: 5/5  Left wrist extension: 5/5  Right interossei: 5/5  Left interossei: 5/5  Right iliopsoas: 5/5  Left iliopsoas: 5/5  Right quadriceps: 5/5  Left quadriceps: 5/5  Right glutei: 5/5  Left glutei: 5/5  Right anterior tibial: 5/5  Left anterior tibial: 5/5  Right posterior tibial: 5/5  Left posterior tibial: 5/5  Right peroneal: 5/5  Left peroneal: 5/5  Right gastroc: 5/5  Left gastroc: 5/5    Sensory Exam   Light touch normal.   Vibration normal.     Gait, Coordination, and Reflexes     Coordination   Finger to nose coordination: normal    Tremor   Resting tremor: absent  Intention tremor: absent  Action tremor: absent    Reflexes   Right brachioradialis: 0  Left brachioradialis: 0  Right biceps: 0  Right triceps: 0  Left triceps: 0  Right patellar: 0  Left patellar: 0  Right achilles: 0  Left achilles: 0  Right plantar: normal  Left plantar: normal  Right Green: absent  Left Green: absent  Right ankle clonus: absent  Left ankle clonus: absent  Rapid alternating movements are slow bilaterally.        ADDITIONAL REVIEW:  CBC:    Recent Labs     04/01/21 1647 04/02/21  0440   WBC 12.4* 11.5*   HGB 5.9* 6.9*    322     BMP:     Recent Labs     04/01/21  1647 04/02/21  0440   * 136   K 4.0 3.6    102   CO2 21* 25   BUN 29* 23   CREATININE 1.0 0.9   GLUCOSE 143* 107     Hepatic:  Recent Labs     04/01/21  1647   AST 99*   ALT 29   BILITOT 0.4   ALKPHOS 36*     Lipids:    Recent Labs     04/02/21  0440   CHOL 129*   HDL 59     INR:    Recent Labs     04/01/21  1647   INR 1.15     Narrative   CT HEAD WO CONTRAST 4/1/2021 5:02 PM   HISTORY: Altered gait   COMPARISON: CT scan dated 10/23/2020    DOSE LENGTH PRODUCT: 760 mGy cm   TECHNIQUE: Helical tomographic images of the brain were obtained   without the use of intravenous contrast. Automated exposure control   was also utilized to decrease patient radiation dose. FINDINGS:    There is a 2.8 cm cortical and subcortical hypodensity in the anterior   left frontal lobe which is suspicious for an acute cortical infarct. Underlying advanced chronic small vessel ischemic change with global   cortical atrophy. There is an old lacunar infarct in the deep right   frontal lobe. No intra-axial or extra-axial hemorrhage. No   hydrocephalus. Orbital contents are unremarkable. The paranasal   sinuses and mastoids are clear. Calvarium is intact.       Impression   1. There is a new 2.8 cm hypodensity cortical/subcortical hypodensity   in the anterior left frontal lobe, most suspicious for an acute   frontal lobe cortical infarct. Otherwise stable. Signed by Dr Bala Carrion on 4/1/2021 6:38 PM       IMPRESSION:  1. Left frontal stroke, new since 10/2020 but does not appear acute. May be the residua of the left frontal hemorrhage. 2. Anemia  3. H/O stroke and cerebral hemorrhage  4. Dementia with behavioral difficulties    PLAN:  1. Carotid US  2. echocardiogram  3. EGD  4. I would increase Aricept or add Namenda but he has refused to take his medications for the past 8 or more months.     Harpreet Ramos M.D.

## 2021-04-02 NOTE — CONSULTS
Palliative Care:    Pt initiated for support and he is known to Palliative Care team.  Currently pt is asleep. He has a sitter present and has been agitated at times. Did not wake him. Information received from his care staff and spoke with SW who has been in touch with family. Likely to need a higher level of care and his daughter and Ingrid Stroud is coming in to meet with SW.  Pt has an established decision maker and is DNR. Palliative Care will be available for support as needed.     Electronically signed by Augie Escobar RN on 4/2/2021 at 1:20 PM

## 2021-04-02 NOTE — PROGRESS NOTES
SLP discussed speech order with RN. Pt went to MRI. SLP to complete order at a later date.     Electronically signed by Rupali Gunter M.S.,CCC-SLP on 4/2/21 at 4:14 PM CDT

## 2021-04-02 NOTE — TELEPHONE ENCOUNTER
Caller: Ericka Florence    Relationship: Emergency Contact    Best call back number: [phone]    PATIENT'S DAUGHTER ERICKA CALLED TO SAY THAT HER DAD, WAS ADMITTED TO [facility] LAST NIGHT, 4/1/21

## 2021-04-02 NOTE — PROGRESS NOTES
Pt has been increasingly agitated. He has tried to get out of bed without assistance many times and shakes his fist at the sitter and this nurse. Pt's bed alarm is set and sitter at bedside. Will continue to monitor.   Electronically signed by Osmin Otero RN on 4/2/2021 at 12:47 AM

## 2021-04-02 NOTE — CONSULTS
[facility] [address] [facility]                                  CONSULTATION    PATIENT NAME: Darlene Burt                  :        1935  MED REC NO:   [id number]                              ROOM:       [facility]  ACCOUNT NO:   [de-identified]                           ADMIT DATE: 2021  PROVIDER:     Mikhail Bronson MD    CONSULT DATE:  2021    ASSESSMENT:  1. Symptomatic anemia with admission hemoglobin of 5.9 gm with baseline  hemoglobin last October of 13 gm and positive stool Hemoccults,  significance to be determined. 2.  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 Follow-up colonoscopies remained benign. The patient cannot  remember when his last colonoscopy was done. 3.  History of dementia and now this anemia apparently precipitated a  new cerebral infarction being further evaluated. RECOMMENDATIONS:  1. Protonix IV. 2.  Transfuse 2 units of packed cells. 3.  Monitor H and H q.6 hours. 4.  Bowel prep today, then EGD and colonoscopy tomorrow. 5.  CT of the head is being followed up with MRI study ordered by a  neurologist.    HISTORY OF PRESENT ILLNESS:  This 81-year-old male has dementia with a  history of traumatic brain hemorrhage for which he was hospitalized in  10/2020, and history of hypertension. He has a surgical history of  undergoing a limited laparoscopic colectomy for removal of a carcinoma  in situ on 2007. The patient's family were told that the cancer  was localized and no further workup or treatment was needed other than  followup colonoscopies. Old records indicate serial colonoscopies in  , , and , by Dr. Mallika Solorio with no recurrence of tumor or  polyps.   One or more of these colonoscopies were done at [facility] and I do not have the actual reports available at the time of  this dictation. The patient was brought to the ED here via EMS due to a new change in  mental status which was seemingly above and beyond his mild-to-moderate  dementia. The patient is a poor historian, but states that he has had  increased weakness. Initial CBC showed low hemoglobin of 5.9 gm,  hematocrit 14.8% with normocytic normochromic indices and brown stool  was frankly heme-positive. Platelet count was normal and pro time was  14.7 seconds with INR of 1.15. Renal function and electrolytes were  essentially normal.  Liver function tests did not suggest any active  liver disease. The patient had suffered a right frontal parenchymal  hemorrhage remotely in the past.  Follow-up CAT scan on admission  reports a new 2.8 cm cortical/subcortical hypodensity in the anterior  left frontal lobe worrisome of a new frontal lobe cortical infarct. This will be followed up with further evaluation by MRI. There has been  no recent CAT scan of the abdomen here at our hospital.  There was a  remote right upper quadrant ultrasound done in 2011, which reported to  show gallstones within the gallbladder, but no gallbladder wall  thickening or common bile duct dilatation. The patient is a poor historian and most of the above history is  obtained from his daughter. He denies any abdominal pain, difficulty  swallowing, heartburn, or recognized melena. There is no background  history of progressive weight loss, jaundice, or fever. He is not known  to take any GI irritants. PAST MEDICAL HISTORY:  Hypertension, hyperlipidemia, strokes, GERD,  colon cancer resection, and cholelithiasis. SURGICAL HISTORY:  Appendectomy, laparoscopic cholecystectomy, segmental  colectomy, surgical repairs of rectal fissures. ALLERGIES TO MEDICATIONS:  PENICILLIN, AUGMENTIN. CURRENT MAINTENANCE MEDICATIONS:  See admission medication  reconciliation. SOCIAL HISTORY:  .   No tobacco use, prior bourbon drinker and  occasional wine, but no history of alcoholism. FAMILY HISTORY:  No GI malignancy. PHYSICAL EXAMINATION:  GENERAL:  Alert, in no distress, afebrile. VITAL SIGNS:  Stable. HEENT:  Moderate pallor. Sclerae white. Conjunctivae pale. Tongue  moist.  NECK:  Supple. LUNGS:  Clear. HEART:  No S3.  ABDOMEN:  Soft abdomen. No epigastric tenderness. Normal bowel sounds. No organomegaly. No palpable hernias. Stool heme-positive. The procedures of EGD and colonoscopy have been discussed with the  patient and daughter present including indication, alternatives, and  risks. Dr. Wilver Agosto will assume care of the patient tomorrow.         Randall Briscoe MD    D: 04/02/2021 14:50:23      T: 04/02/2021 15:02:03     [email or web address]  Job#: [id number]     Doc#: [id number]    CC:

## 2021-04-02 NOTE — PROGRESS NOTES
Rose Candelario arrived to room # 430. Presented with: anemia requiring transfusions  Mental Status: Patient is disoriented. Vitals:    04/01/21 1856   BP: 124/74   Pulse: 101   Resp: 17   Temp: 97.4 °F (36.3 °C)   SpO2:      Patient safety contract and falls prevention contract reviewed with patient Yes. Oriented Patient to room. Call light within reach. Yes.   Needs, issues or concerns expressed at this time: no.      Electronically signed by Debora Dee RN on 4/1/2021 at 9:55 PM

## 2021-04-03 ENCOUNTER — ANESTHESIA EVENT (OUTPATIENT)
Dept: ENDOSCOPY | Age: 86
DRG: 378 | End: 2021-04-03
Payer: MEDICARE

## 2021-04-03 ENCOUNTER — ANESTHESIA (OUTPATIENT)
Dept: ENDOSCOPY | Age: 86
DRG: 378 | End: 2021-04-03
Payer: MEDICARE

## 2021-04-03 VITALS — OXYGEN SATURATION: 100 % | SYSTOLIC BLOOD PRESSURE: 73 MMHG | DIASTOLIC BLOOD PRESSURE: 48 MMHG

## 2021-04-03 LAB
ANION GAP SERPL CALCULATED.3IONS-SCNC: 7 MMOL/L (ref 7–19)
BUN BLDV-MCNC: 18 MG/DL (ref 8–23)
CALCIUM SERPL-MCNC: 8.3 MG/DL (ref 8.8–10.2)
CEA: 2.4 NG/ML (ref 0–4.7)
CHLORIDE BLD-SCNC: 107 MMOL/L (ref 98–111)
CO2: 26 MMOL/L (ref 22–29)
CREAT SERPL-MCNC: 0.9 MG/DL (ref 0.5–1.2)
GFR AFRICAN AMERICAN: >59
GFR NON-AFRICAN AMERICAN: >60
GLUCOSE BLD-MCNC: 93 MG/DL (ref 70–99)
GLUCOSE BLD-MCNC: 94 MG/DL (ref 74–109)
HCT VFR BLD CALC: 26.3 % (ref 42–52)
HCT VFR BLD CALC: 26.6 % (ref 42–52)
HCT VFR BLD CALC: 28.7 % (ref 42–52)
HEMOGLOBIN: 7.9 G/DL (ref 14–18)
HEMOGLOBIN: 8.1 G/DL (ref 14–18)
HEMOGLOBIN: 8.6 G/DL (ref 14–18)
MCH RBC QN AUTO: 27.7 PG (ref 27–31)
MCH RBC QN AUTO: 27.9 PG (ref 27–31)
MCH RBC QN AUTO: 27.9 PG (ref 27–31)
MCHC RBC AUTO-ENTMCNC: 30 G/DL (ref 33–37)
MCHC RBC AUTO-ENTMCNC: 30 G/DL (ref 33–37)
MCHC RBC AUTO-ENTMCNC: 30.5 G/DL (ref 33–37)
MCV RBC AUTO: 91.7 FL (ref 80–94)
MCV RBC AUTO: 92.6 FL (ref 80–94)
MCV RBC AUTO: 92.9 FL (ref 80–94)
PDW BLD-RTO: 14.2 % (ref 11.5–14.5)
PDW BLD-RTO: 14.2 % (ref 11.5–14.5)
PDW BLD-RTO: 14.3 % (ref 11.5–14.5)
PERFORMED ON: NORMAL
PLATELET # BLD: 282 K/UL (ref 130–400)
PLATELET # BLD: 286 K/UL (ref 130–400)
PLATELET # BLD: 294 K/UL (ref 130–400)
PMV BLD AUTO: 9.2 FL (ref 9.4–12.4)
PMV BLD AUTO: 9.7 FL (ref 9.4–12.4)
PMV BLD AUTO: 9.8 FL (ref 9.4–12.4)
POTASSIUM REFLEX MAGNESIUM: 3.9 MMOL/L (ref 3.5–5)
RBC # BLD: 2.83 M/UL (ref 4.7–6.1)
RBC # BLD: 2.9 M/UL (ref 4.7–6.1)
RBC # BLD: 3.1 M/UL (ref 4.7–6.1)
SODIUM BLD-SCNC: 140 MMOL/L (ref 136–145)
WBC # BLD: 10.4 K/UL (ref 4.8–10.8)
WBC # BLD: 7.5 K/UL (ref 4.8–10.8)
WBC # BLD: 8.7 K/UL (ref 4.8–10.8)

## 2021-04-03 PROCEDURE — 6360000002 HC RX W HCPCS: Performed by: STUDENT IN AN ORGANIZED HEALTH CARE EDUCATION/TRAINING PROGRAM

## 2021-04-03 PROCEDURE — 99222 1ST HOSP IP/OBS MODERATE 55: CPT | Performed by: INTERNAL MEDICINE

## 2021-04-03 PROCEDURE — 7100000011 HC PHASE II RECOVERY - ADDTL 15 MIN: Performed by: INTERNAL MEDICINE

## 2021-04-03 PROCEDURE — 0DB78ZX EXCISION OF STOMACH, PYLORUS, VIA NATURAL OR ARTIFICIAL OPENING ENDOSCOPIC, DIAGNOSTIC: ICD-10-PCS | Performed by: INTERNAL MEDICINE

## 2021-04-03 PROCEDURE — 1210000000 HC MED SURG R&B

## 2021-04-03 PROCEDURE — 80048 BASIC METABOLIC PNL TOTAL CA: CPT

## 2021-04-03 PROCEDURE — C9113 INJ PANTOPRAZOLE SODIUM, VIA: HCPCS | Performed by: INTERNAL MEDICINE

## 2021-04-03 PROCEDURE — 0DJD8ZZ INSPECTION OF LOWER INTESTINAL TRACT, VIA NATURAL OR ARTIFICIAL OPENING ENDOSCOPIC: ICD-10-PCS | Performed by: INTERNAL MEDICINE

## 2021-04-03 PROCEDURE — 82378 CARCINOEMBRYONIC ANTIGEN: CPT

## 2021-04-03 PROCEDURE — 6360000002 HC RX W HCPCS: Performed by: ANESTHESIOLOGY

## 2021-04-03 PROCEDURE — 97116 GAIT TRAINING THERAPY: CPT

## 2021-04-03 PROCEDURE — 7100000010 HC PHASE II RECOVERY - FIRST 15 MIN: Performed by: INTERNAL MEDICINE

## 2021-04-03 PROCEDURE — 36415 COLL VENOUS BLD VENIPUNCTURE: CPT

## 2021-04-03 PROCEDURE — 2500000003 HC RX 250 WO HCPCS: Performed by: ANESTHESIOLOGY

## 2021-04-03 PROCEDURE — 6360000002 HC RX W HCPCS: Performed by: INTERNAL MEDICINE

## 2021-04-03 PROCEDURE — 2580000003 HC RX 258: Performed by: ANESTHESIOLOGY

## 2021-04-03 PROCEDURE — 3700000001 HC ADD 15 MINUTES (ANESTHESIA): Performed by: INTERNAL MEDICINE

## 2021-04-03 PROCEDURE — 3609017100 HC EGD: Performed by: INTERNAL MEDICINE

## 2021-04-03 PROCEDURE — 7100000001 HC PACU RECOVERY - ADDTL 15 MIN: Performed by: INTERNAL MEDICINE

## 2021-04-03 PROCEDURE — 2580000003 HC RX 258: Performed by: INTERNAL MEDICINE

## 2021-04-03 PROCEDURE — 82947 ASSAY GLUCOSE BLOOD QUANT: CPT

## 2021-04-03 PROCEDURE — 3609027000 HC COLONOSCOPY: Performed by: INTERNAL MEDICINE

## 2021-04-03 PROCEDURE — C9113 INJ PANTOPRAZOLE SODIUM, VIA: HCPCS | Performed by: STUDENT IN AN ORGANIZED HEALTH CARE EDUCATION/TRAINING PROGRAM

## 2021-04-03 PROCEDURE — 85027 COMPLETE CBC AUTOMATED: CPT

## 2021-04-03 PROCEDURE — 6370000000 HC RX 637 (ALT 250 FOR IP): Performed by: STUDENT IN AN ORGANIZED HEALTH CARE EDUCATION/TRAINING PROGRAM

## 2021-04-03 PROCEDURE — 43239 EGD BIOPSY SINGLE/MULTIPLE: CPT | Performed by: INTERNAL MEDICINE

## 2021-04-03 PROCEDURE — 3700000000 HC ANESTHESIA ATTENDED CARE: Performed by: INTERNAL MEDICINE

## 2021-04-03 PROCEDURE — 6370000000 HC RX 637 (ALT 250 FOR IP): Performed by: INTERNAL MEDICINE

## 2021-04-03 PROCEDURE — 7100000000 HC PACU RECOVERY - FIRST 15 MIN: Performed by: INTERNAL MEDICINE

## 2021-04-03 PROCEDURE — 45378 DIAGNOSTIC COLONOSCOPY: CPT | Performed by: INTERNAL MEDICINE

## 2021-04-03 PROCEDURE — 2709999900 HC NON-CHARGEABLE SUPPLY: Performed by: INTERNAL MEDICINE

## 2021-04-03 PROCEDURE — 2580000003 HC RX 258: Performed by: STUDENT IN AN ORGANIZED HEALTH CARE EDUCATION/TRAINING PROGRAM

## 2021-04-03 RX ORDER — ONDANSETRON 2 MG/ML
4 INJECTION INTRAMUSCULAR; INTRAVENOUS
Status: CANCELLED | OUTPATIENT
Start: 2021-04-03 | End: 2021-04-03

## 2021-04-03 RX ORDER — PROMETHAZINE HYDROCHLORIDE 25 MG/ML
6.25 INJECTION, SOLUTION INTRAMUSCULAR; INTRAVENOUS
Status: DISCONTINUED | OUTPATIENT
Start: 2021-04-03 | End: 2021-04-03 | Stop reason: HOSPADM

## 2021-04-03 RX ORDER — SODIUM CHLORIDE, SODIUM LACTATE, POTASSIUM CHLORIDE, CALCIUM CHLORIDE 600; 310; 30; 20 MG/100ML; MG/100ML; MG/100ML; MG/100ML
INJECTION, SOLUTION INTRAVENOUS CONTINUOUS PRN
Status: DISCONTINUED | OUTPATIENT
Start: 2021-04-03 | End: 2021-04-03 | Stop reason: SDUPTHER

## 2021-04-03 RX ORDER — LIDOCAINE HYDROCHLORIDE 10 MG/ML
INJECTION, SOLUTION INFILTRATION; PERINEURAL PRN
Status: DISCONTINUED | OUTPATIENT
Start: 2021-04-03 | End: 2021-04-03 | Stop reason: SDUPTHER

## 2021-04-03 RX ORDER — METOCLOPRAMIDE HYDROCHLORIDE 5 MG/ML
10 INJECTION INTRAMUSCULAR; INTRAVENOUS
Status: DISCONTINUED | OUTPATIENT
Start: 2021-04-03 | End: 2021-04-03 | Stop reason: HOSPADM

## 2021-04-03 RX ORDER — SODIUM CHLORIDE, SODIUM LACTATE, POTASSIUM CHLORIDE, CALCIUM CHLORIDE 600; 310; 30; 20 MG/100ML; MG/100ML; MG/100ML; MG/100ML
INJECTION, SOLUTION INTRAVENOUS CONTINUOUS
Status: CANCELLED | OUTPATIENT
Start: 2021-04-03

## 2021-04-03 RX ORDER — DIPHENHYDRAMINE HYDROCHLORIDE 50 MG/ML
12.5 INJECTION INTRAMUSCULAR; INTRAVENOUS
Status: DISCONTINUED | OUTPATIENT
Start: 2021-04-03 | End: 2021-04-03 | Stop reason: HOSPADM

## 2021-04-03 RX ORDER — LABETALOL HYDROCHLORIDE 5 MG/ML
5 INJECTION, SOLUTION INTRAVENOUS EVERY 10 MIN PRN
Status: DISCONTINUED | OUTPATIENT
Start: 2021-04-03 | End: 2021-04-03 | Stop reason: HOSPADM

## 2021-04-03 RX ORDER — LIDOCAINE HYDROCHLORIDE 10 MG/ML
1 INJECTION, SOLUTION EPIDURAL; INFILTRATION; INTRACAUDAL; PERINEURAL
Status: CANCELLED | OUTPATIENT
Start: 2021-04-03 | End: 2021-04-03

## 2021-04-03 RX ORDER — MORPHINE SULFATE 4 MG/ML
2 INJECTION, SOLUTION INTRAMUSCULAR; INTRAVENOUS EVERY 5 MIN PRN
Status: DISCONTINUED | OUTPATIENT
Start: 2021-04-03 | End: 2021-04-03 | Stop reason: HOSPADM

## 2021-04-03 RX ORDER — MIDAZOLAM HYDROCHLORIDE 1 MG/ML
2 INJECTION INTRAMUSCULAR; INTRAVENOUS
Status: CANCELLED | OUTPATIENT
Start: 2021-04-03 | End: 2021-04-03

## 2021-04-03 RX ORDER — HYDROMORPHONE HYDROCHLORIDE 1 MG/ML
0.25 INJECTION, SOLUTION INTRAMUSCULAR; INTRAVENOUS; SUBCUTANEOUS EVERY 5 MIN PRN
Status: DISCONTINUED | OUTPATIENT
Start: 2021-04-03 | End: 2021-04-03 | Stop reason: HOSPADM

## 2021-04-03 RX ORDER — SODIUM CHLORIDE 0.9 % (FLUSH) 0.9 %
10 SYRINGE (ML) INJECTION PRN
Status: CANCELLED | OUTPATIENT
Start: 2021-04-03

## 2021-04-03 RX ORDER — HYDROMORPHONE HYDROCHLORIDE 1 MG/ML
0.5 INJECTION, SOLUTION INTRAMUSCULAR; INTRAVENOUS; SUBCUTANEOUS EVERY 5 MIN PRN
Status: DISCONTINUED | OUTPATIENT
Start: 2021-04-03 | End: 2021-04-03 | Stop reason: HOSPADM

## 2021-04-03 RX ORDER — PROPOFOL 10 MG/ML
INJECTION, EMULSION INTRAVENOUS PRN
Status: DISCONTINUED | OUTPATIENT
Start: 2021-04-03 | End: 2021-04-03 | Stop reason: SDUPTHER

## 2021-04-03 RX ORDER — MEPERIDINE HYDROCHLORIDE 50 MG/ML
12.5 INJECTION INTRAMUSCULAR; INTRAVENOUS; SUBCUTANEOUS EVERY 5 MIN PRN
Status: DISCONTINUED | OUTPATIENT
Start: 2021-04-03 | End: 2021-04-03 | Stop reason: HOSPADM

## 2021-04-03 RX ORDER — MORPHINE SULFATE 4 MG/ML
4 INJECTION, SOLUTION INTRAMUSCULAR; INTRAVENOUS EVERY 5 MIN PRN
Status: DISCONTINUED | OUTPATIENT
Start: 2021-04-03 | End: 2021-04-03 | Stop reason: HOSPADM

## 2021-04-03 RX ORDER — SODIUM CHLORIDE 0.9 % (FLUSH) 0.9 %
10 SYRINGE (ML) INJECTION EVERY 12 HOURS SCHEDULED
Status: CANCELLED | OUTPATIENT
Start: 2021-04-03

## 2021-04-03 RX ORDER — HYDRALAZINE HYDROCHLORIDE 20 MG/ML
5 INJECTION INTRAMUSCULAR; INTRAVENOUS EVERY 10 MIN PRN
Status: DISCONTINUED | OUTPATIENT
Start: 2021-04-03 | End: 2021-04-03 | Stop reason: HOSPADM

## 2021-04-03 RX ADMIN — SODIUM CHLORIDE, SODIUM LACTATE, POTASSIUM CHLORIDE, AND CALCIUM CHLORIDE: 600; 310; 30; 20 INJECTION, SOLUTION INTRAVENOUS at 10:27

## 2021-04-03 RX ADMIN — PROPOFOL 50 MG: 10 INJECTION, EMULSION INTRAVENOUS at 11:02

## 2021-04-03 RX ADMIN — PROPOFOL 50 MG: 10 INJECTION, EMULSION INTRAVENOUS at 10:42

## 2021-04-03 RX ADMIN — PROPOFOL 50 MG: 10 INJECTION, EMULSION INTRAVENOUS at 10:29

## 2021-04-03 RX ADMIN — SODIUM CHLORIDE, PRESERVATIVE FREE 10 ML: 5 INJECTION INTRAVENOUS at 08:05

## 2021-04-03 RX ADMIN — PANTOPRAZOLE SODIUM 40 MG: 40 INJECTION, POWDER, FOR SOLUTION INTRAVENOUS at 20:01

## 2021-04-03 RX ADMIN — ATORVASTATIN CALCIUM 40 MG: 40 TABLET, FILM COATED ORAL at 20:03

## 2021-04-03 RX ADMIN — PANTOPRAZOLE SODIUM 40 MG: 40 INJECTION, POWDER, FOR SOLUTION INTRAVENOUS at 08:05

## 2021-04-03 RX ADMIN — METOPROLOL SUCCINATE 25 MG: 25 TABLET, EXTENDED RELEASE ORAL at 20:00

## 2021-04-03 RX ADMIN — LIDOCAINE HYDROCHLORIDE 50 MG: 10 INJECTION, SOLUTION INFILTRATION; PERINEURAL at 10:29

## 2021-04-03 RX ADMIN — PROPOFOL 50 MG: 10 INJECTION, EMULSION INTRAVENOUS at 10:50

## 2021-04-03 RX ADMIN — SODIUM CHLORIDE, PRESERVATIVE FREE 10 ML: 5 INJECTION INTRAVENOUS at 20:01

## 2021-04-03 ASSESSMENT — ENCOUNTER SYMPTOMS: SHORTNESS OF BREATH: 0

## 2021-04-03 ASSESSMENT — PAIN SCALES - GENERAL: PAINLEVEL_OUTOF10: 0

## 2021-04-03 ASSESSMENT — LIFESTYLE VARIABLES: SMOKING_STATUS: 0

## 2021-04-03 NOTE — PROCEDURES
Endoscopic Procedure Note    Patient: Mirtha Yarbrough : 1935  Med Rec#: [id number] Acc#: [id number]     Primary Care Provider No primary care provider on file. Referring Provider: Kim Lyon    Endoscopist: Hans Weaver MD    Date of Procedure:  4/3/2021    Procedure:   1. EGD w/ Biopsies    Indications:   1. Fecal occult positive stool  2. Profound anemia    Anesthesia:  Sedation was administered by anesthesia who monitored the patient during the procedure. Estimated Blood Loss: minimal    Procedure:   After reviewing the patient's chart and obtaining informed consent, the patient was placed in the left lateral decubitus position. A forward-viewing Olympus endoscope was lubricated and inserted through the mouth into the oropharynx. Under direct visualization, the upper esophagus was intubated. The scope was advanced to the level of the third portion of duodenum. Scope was slowly withdrawn with careful inspection of the mucosal surfaces. The scope was retroflexed for inspection of the gastric fundus and incisura. Findings and maneuvers are listed in impression below. The patient tolerated the procedure well. The scope was removed. There were no immediate complications. Findings:   Esophagus: positive for LA grade a esophagitis; biopsies were taken     Stomach:  Jyl Mosley; with the exception of mucosal changes suggestive of gastritis noted in the body, incisura, antrum, prepyloric region. Gastric biopsies were taken from the antrum and body to rule out Helicobacter pylori infection. Duodenum: normal      IMPRESSION:  1. Esophagitis  2. Gastritis  3. Normal duodenum     RECOMMENDATIONS:    1. Await path results, the patient will be contacted in 7-10 days with biopsy results. 2.  Colonoscopy today. The results were discussed with the hospitalist caring for the patient.       Hans Weaver DO  4/3/2021  11:09 AM

## 2021-04-03 NOTE — PROCEDURES
Patient: Yosef Calzada : 1935  Med Rec#: [id number] Acc#: [id number]   Primary Care Provider No primary care provider on file. Date of Procedure:  4/3/2021    Endoscopist: Michael Salcido MD    Referring Provider: No primary care provider on file.,     Operation Performed: Colonoscopy     Indications: + FOBT; Profound anemia; Remote history of colon cancer. Anesthesia:  Sedation was administered by anesthesia who monitored the patient during the procedure. I met with Yosef Calzada prior to procedure. We discussed the procedure itself, and I have discussed the risks of endoscopy (including-- but not limited to-- pain, discomfort, bleeding potentially requiring second endoscopic procedure and/or blood transfusion, organ perforation requiring operative repair, damage to organs near the colon, infection, aspiration, cardiopulmonary/allergic reaction), benefits, indications to endoscopy. Additionally, we discussed options other than colonoscopy. The patient expressed understanding. All questions answered. The patient decided to proceed with the procedure. Signed informed consent was placed on the chart. Blood Loss: None. Withdrawal time: N/A; didn't make it to the cecum/ anastomotic site. Bowel Prep: Exteremly poor. Complications: no immediate complications    DESCRIPTION OF PROCEDURE:     A time out was performed. After written informed consent was obtained, the patient was placed in the left lateral position. The perianal area was inspected, and a digital rectal exam was performed. A rectal exam was performed: normal tone, no palpable lesions. At this point, a forward viewing Olympus colonoscope was inserted into the anus and carefully advanced to the proximal transverse colon. The cecum was identified by the ileocecal valve and the appendiceal orifice. The colonoscope was then slowly withdrawn with careful inspection of the mucosa in a linear and circumferential fashion.

## 2021-04-03 NOTE — PROGRESS NOTES
Pt remains lethargic from meds early for MRI per day nurse. Attempted to wake pt to drink, staff in room will and is encouraging pt to drink prep for cscope in am. Will continue to monitor and administer prep.

## 2021-04-03 NOTE — PROGRESS NOTES
Physical Therapy  Name: Marii Salmon  MRN:  [id number]  Date of service:  4/3/2021     04/03/21 1550   Restrictions/Precautions   Restrictions/Precautions Fall Risk   Required Braces or Orthoses? No   General   Chart Reviewed Yes   Subjective   Subjective Pt in bed, agreeable to walk with therapy. A little confused at first but able to make meaningful conversation farther into tx. Pain Screening   Patient Currently in Pain No   Intervention List Patient able to continue with treatment   Bed Mobility   Supine to Sit Stand by assistance   Transfers   Sit to Stand Contact guard assistance   Stand to sit Stand by assistance   Ambulation   Ambulation? Yes   Ambulation 1   Surface level tile   Device No Device   Assistance Contact guard assistance   Quality of Gait unsteady overall, fast pace, wide HARRIETT, no major LOB   Gait Deviations Increased HARRIETT; Deviated path;Staggers   Distance 400'   Short term goals   Time Frame for Short term goals 2 wks   Short term goal 1 supine to sit indep   Short term goal 2 sit to stand indep   Short term goal 3 amb. 100' indep, no LOB   Conditions Requiring Skilled Therapeutic Intervention   Body structures, Functions, Activity limitations Decreased functional mobility ; Decreased strength;Decreased cognition;Decreased safe awareness;Decreased endurance;Decreased posture;Decreased balance   Assessment Pt very unsteady on his feet, does tend to stagger and deviate from straight path but no true LOB. Pt up in chair with sitter present following tx. Activity Tolerance   Activity Tolerance Patient limited by cognitive status; Patient Tolerated treatment well   Safety Devices   Type of devices Gait belt;Left in chair;Call light within reach; Sitter present         Electronically signed by Letitia Weiss PTA on 4/3/2021 at 3:53 PM

## 2021-04-03 NOTE — PROGRESS NOTES
Pt drank a large amount of prep, but then stated he wanted to left alone to sleep. I did inform him of colonoscopy in the am. Pt stated \"I am 81years old what does it matter\". I talked to him about the doctor speaking to his daughter, and what the plan of care was, also that I needed to give him an enema tonight. Pt refused the enema, stating he didn't want it and just to be left alone. Again I did explain the need for it for procedure, pt still doesn't want and pulled the covers up and said \"damn leave me alone\". Will keep encouraging pt to drink and will attempt enema again later.

## 2021-04-03 NOTE — ANESTHESIA PRE PROCEDURE
Department of Anesthesiology  Preprocedure Note       Name:  Robin French   Age:  81 y.o.  :  1935                                          MRN:  [id number]         Date:  4/3/2021      Surgeon: Alicja Capellan):  Leonid Dupree MD    Procedure: Procedure(s):  EGD DIAGNOSTIC ONLY  COLONOSCOPY DIAGNOSTIC    Medications prior to admission:   Prior to Admission medications    Medication Sig Start Date End Date Taking?  Authorizing Provider   dextromethorphan-quiNIDine (NUEDEXTA) 20-10 MG CAPS per capsule TAKE ONE TABLET BY MOUTH 2 TIMES A DAY 20   Tesha Doll MD   donepezil (ARICEPT) 10 MG tablet Take one each am with breakfast 19   Tesha Doll MD   loperamide (IMODIUM A-D) 2 MG tablet Take 1 tablet by mouth 3 times daily as needed    Historical Provider, MD   cholestyramine light 4 g packet Take 1 packet by mouth daily 19   Manisha Sessions, PA-C   lisinopril (PRINIVIL;ZESTRIL) 5 MG tablet Take 1 tablet by mouth daily 19   Manisha Sessions, PA-ARETHA   QUEtiapine (SEROQUEL) 25 MG tablet Take 1 tablet by mouth nightly as needed for Agitation 19   Manisha Sessions, PA-C   sildenafil (REVATIO) 20 MG tablet Take 20 mg by mouth as needed (take 3 tablets by mouth daily as needed)    Historical Provider, MD   metoprolol succinate (TOPROL XL) 25 MG extended release tablet Take 25 mg by mouth 2 times daily    Historical Provider, MD   omeprazole (PRILOSEC) 20 MG delayed release capsule Take 40 mg by mouth daily    Historical Provider, MD       Current medications:    Current Facility-Administered Medications   Medication Dose Route Frequency Provider Last Rate Last Admin    0.9 % sodium chloride infusion   Intravenous PRN Debbie Vernon MD        0.9 % sodium chloride infusion   Intravenous PRN Jean Carlos Obrien MD        atorvastatin (LIPITOR) tablet 40 mg  40 mg Oral Nightly Jean Carlos Obrien MD   40 mg at 21    insulin lispro (HUMALOG) injection vial 0-6 Units  0-6 Units Subcutaneous TID WC Carmen Hay MD        insulin lispro (HUMALOG) injection vial 0-3 Units  0-3 Units Subcutaneous Nightly Carmen Hay MD        glucose (GLUTOSE) 40 % oral gel 15 g  15 g Oral PRN Carmen Hay MD        dextrose 50 % IV solution  12.5 g Intravenous PRN Carmen Hay MD        glucagon (rDNA) injection 1 mg  1 mg Intramuscular PRN Carmen Hay MD        dextrose 5 % solution  100 mL/hr Intravenous PRN Carmen Hay MD        0.9 % sodium chloride infusion   Intravenous PRN RADHA Preston        [Held by provider] metoprolol succinate (TOPROL XL) extended release tablet 25 mg  25 mg Oral BID Tyree Cotter MD        [Held by provider] cholestyramine light packet 4 g  4 g Oral Daily Tyree Cotter MD        [Held by provider] lisinopril (PRINIVIL;ZESTRIL) tablet 5 mg  5 mg Oral Daily Tyree Cotter MD        QUEtiapine (SEROQUEL) tablet 25 mg  25 mg Oral Nightly PRN Tyree Cotter MD   25 mg at 04/01/21 2249    [Held by provider] loperamide (IMODIUM) capsule 2 mg  2 mg Oral TID PRN Tyree Cotter MD       [facility] [place] by provider] donepezil (ARICEPT) tablet 10 mg  10 mg Oral Daily Tyree Cotter MD        [Held by provider] dextromethorphan-quiNIDine (NUEDEXTA) 20-10 MG per capsule 1 capsule  1 capsule Oral Daily Tyree Cotter MD        pantoprazole (PROTONIX) injection 40 mg  40 mg Intravenous BID Tyree Cotter MD   40 mg at 04/03/21 0805    And    sodium chloride (PF) 0.9 % injection 10 mL  10 mL Intravenous BID Tyree Cotter MD   10 mL at 04/03/21 0805    sodium chloride flush 0.9 % injection 10 mL  10 mL Intravenous 2 times per day Tyree Cotter MD   10 mL at 04/02/21 2036    sodium chloride flush 0.9 % injection 10 mL  10 mL Intravenous PRN Tyree Cotter MD        0.9 % sodium chloride infusion  25 mL Intravenous PRN Tyree Cotter MD        promethazine (PHENERGAN) tablet 12.5 mg  12.5 mg Oral Q6H PRN Tyree Cotter MD        Or    ondansetron [facility]) injection 4 mg  4 mg Intravenous Q6H PRN Tyree Cotter MD        0.9 % sodium chloride infusion   Intravenous Continuous Tyree Cotter MD 75 mL/hr at 04/01/21 2252 New Bag at 04/01/21 2252    acetaminophen (TYLENOL) tablet 650 mg  650 mg Oral Q4H PRN Tyree Cotter MD        Or   Bogdan Pachecos acetaminophen (TYLENOL) suppository 650 mg  650 mg Rectal Q4H PRN Tyree Cotter MD        labetalol (NORMODYNE;TRANDATE) injection 10 mg  10 mg Intravenous Q10 Min PRN Tyree Cotter MD           Allergies:     Allergies   Allergen Reactions    Amoxicillin-Pot Clavulanate        Problem List:    Patient Active Problem List   Diagnosis Code    S/P laparoscopic cholecystectomy Z90.49    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    Acute CVA (cerebrovascular accident) ([facility] I63.9    Memory deficit R41.3    Hard of hearing H91.90    Dementia ([facility] F03.90    B12 deficiency E53.8    Essential hypertension I10    Traumatic hemorrhage of right cerebrum ([facility] C90.473I    Benign prostatic hyperplasia with urinary obstruction N40.1, N13.8    History of colon cancer Z85.038    Palliative care patient Z51.5    Anemia D64.9    GI bleed K92.2       Past Medical History:        Diagnosis Date    Allergic rhinitis     BPH (benign prostatic hyperplasia)     Cholelithiases     Colon cancer ([facility]     Colon polyp     GERD (gastroesophageal reflux disease)     Hearing loss     Hyperlipidemia     Hypertension     Palliative care patient 10/23/2020    Spastic colon        Past Surgical History:        Procedure Laterality Date    APPENDECTOMY      CHOLECYSTECTOMY, LAPAROSCOPIC  2/24/12    COLON SURGERY      colectomy    RECTAL SURGERY      fissure repair x2       Social History:    Social History     Tobacco Use    Smoking status: Never Smoker    Smokeless tobacco: Never Used   Substance Use Topics    Alcohol use: Yes     Comment: 4 per week                                Counseling given: Not Answered      Vital Signs (Current):   Vitals:    04/02/21 1230 04/02/21 2039 04/02/21 2236 04/03/21 0654   BP: 130/75 115/65 106/60 (!) 105/58   Pulse: 80 70 67 78   Resp: 18 20 20 16   Temp: 98.6 °F (37 °C) 99.9 °F (37.7 °C) 98.5 °F (36.9 °C) 97.7 °F (36.5 °C)   TempSrc: Temporal Temporal Temporal Temporal   SpO2: 94% 95% 96% 94%   Weight:       Height:                                                  BP Readings from Last 3 Encounters:   04/03/21 (!) 105/58   03/11/21 (!) 154/90   10/24/20 (!) 149/89       NPO Status:                                                                                 BMI:   Wt Readings from Last 3 Encounters:   04/01/21 184 lb 3.2 oz (83.6 kg)   10/28/19 199 lb (90.3 kg)   08/31/19 200 lb (90.7 kg)     Body mass index is 26.43 kg/m².     CBC:   Lab Results   Component Value Date    WBC 8.7 04/03/2021    RBC 2.90 04/03/2021    HGB 8.1 04/03/2021    HCT 26.6 04/03/2021    HCT 42.9 02/17/2012    MCV 91.7 04/03/2021    RDW 14.3 04/03/2021     04/03/2021     02/17/2012       CMP:   Lab Results   Component Value Date     04/03/2021     02/17/2012    K 3.9 04/03/2021    K 4.6 02/17/2012     04/03/2021     02/17/2012    CO2 26 04/03/2021    BUN 18 04/03/2021    CREATININE 0.9 04/03/2021    CREATININE 1.0 02/17/2012    GFRAA >59 04/03/2021    LABGLOM >60 04/03/2021    GLUCOSE 94 04/03/2021    PROT 6.4 04/01/2021    PROT 6.7 02/17/2012    CALCIUM 8.3 04/03/2021    BILITOT 0.4 04/01/2021    ALKPHOS 36 04/01/2021    ALKPHOS 42 02/17/2012    AST 99 04/01/2021    ALT 29 04/01/2021       POC Tests:   Recent Labs     04/03/21  0658   POCGLU 93       Coags:   Lab Results   Component Value Date    PROTIME 14.7 04/01/2021    INR 1.15 04/01/2021    APTT 24.1 04/01/2021       HCG (If Applicable): No results found for: PREGTESTUR, PREGSERUM, HCG, HCGQUANT     ABGs: No results found for: PHART, PO2ART, UWA6AYB, LVF5IWA, BEART, J7FBDTLW     Type & Screen (If Applicable):  No results found for: LABABO, [address]    Drug/Infectious Status (If Applicable):  No results found for: HIV, HEPCAB    COVID-19 Screening (If Applicable):   Lab Results   Component Value Date    COVID19 Not Detected 04/01/2021           Anesthesia Evaluation  Patient summary reviewed and Nursing notes reviewed no history of anesthetic complications:   Airway: Mallampati: II  TM distance: >3 FB   Neck ROM: full  Mouth opening: > = 3 FB Dental: normal exam         Pulmonary:Negative Pulmonary ROS and normal exam  breath sounds clear to auscultation      (-) shortness of breath and not a current smoker          Patient did not smoke on day of surgery. Cardiovascular:    (+) hypertension:,     (-) CAD,  angina and  CHF    NYHA Classification: I  ECG reviewed  Rhythm: regular  Rate: normal           Beta Blocker:  Not on Beta Blocker         Neuro/Psych:   Negative Neuro/Psych ROS  (+) CVA:, psychiatric history:   (-) seizures and depression/anxiety             ROS comment: Dementia  GI/Hepatic/Renal: Neg GI/Hepatic/Renal ROS  (+) GERD:,      (-) hiatal hernia       Endo/Other: Negative Endo/Other ROS   (+) blood dyscrasia: anemia:., .          Pt had PAT visit. Abdominal:       Abdomen: soft. Vascular:                                        Anesthesia Plan      general     ASA 4     (Iv zofran within 30 min of closing )  Induction: intravenous. BIS  MIPS: Postoperative opioids intended and Prophylactic antiemetics administered. Anesthetic plan and risks discussed with patient. Use of blood products discussed with patient whom. Plan discussed with CRNA.     Attending anesthesiologist reviewed and agrees with Pre Eval content              Maikol Billings MD   4/3/2021

## 2021-04-03 NOTE — PROGRESS NOTES
Speech Language Pathology  Facility/Department: [facility] 4 ONCOLOGY UNIT   CLINICAL BEDSIDE SWALLOW EVALUATION    NAME: Lizzy Meehan  : 1935  MRN: [id number]    Patient is NPO for procedure today. Will attempt at later time.      Electronically signed by VANESSA Merrill on 4/3/2021 at 10:13 AM

## 2021-04-03 NOTE — ANESTHESIA POSTPROCEDURE EVALUATION
Department of Anesthesiology  Postprocedure Note    Patient: Jasen Cagle  MRN: [id number]  YOB: 1935  Date of evaluation: 4/3/2021  Time:  11:15 AM     Procedure Summary     Date: 04/03/21 Room / Location: [address]    Anesthesia Start: 1019 Anesthesia Stop:     Procedures:       EGD DIAGNOSTIC ONLY (N/A )      COLONOSCOPY DIAGNOSTIC (N/A ) Diagnosis:       GI bleed      (GI Bleed, prior colon cancer resection)    Surgeons: Junior Alexandra MD Responsible Provider: Gema Collins MD    Anesthesia Type: general ASA Status: 4          Anesthesia Type: No value filed. Miguel Phase I:      Miguel Phase II:      Last vitals: Reviewed and per EMR flowsheets. Anesthesia Post Evaluation    Patient location during evaluation: bedside  Patient participation: complete - patient participated  Level of consciousness: confused and sleepy but conscious  Pain score: 0  Airway patency: patent  Nausea & Vomiting: no nausea and no vomiting  Complications: no  Cardiovascular status: blood pressure returned to baseline  Respiratory status: acceptable  Hydration status: euvolemic  Comments: Noted preop pt in rate controlled afib, hemodynamically stable .  GI doc aware

## 2021-04-03 NOTE — PROGRESS NOTES
[facility]        Hospitalist Progress Note  4/3/2021 2:00 PM  Subjective:   Admit Date: 4/1/2021  PCP: No primary care provider on file. Chief Complaint: AMS    Subjective: Patient seen and examined at bedside with sitter and daughter present. Calm and appears comfortable at this time. Endoscopy this morning with new onset atrial fibrillation noted and cardiology consulted. [facility] History: 81-year-old male with a past medical history of dementia, recent previous traumatic brain hemorrhage, hypertension, history of colon cancer presenting to the hospital for worsening mental status and abnormal gait found to be anemic with FOBT+ stool with gastroenterology consulted from the ER. Patient also noted to have acute CVA noted on CT head while in the emergency department. Neurology and gastroenterology consulted. Patient status post MRI brain showing hemorrhagic component of left frontal infarct. EGD/colonoscopy performed 4/3/2021; EGD showing esophagitis and gastritis; colonoscopy with poor preparation recommendation for repeat colonoscopy at a later date. Patient with new onset atrial fibrillation while in the endoscopy suite with cardiology consulted recommending rate control as tolerated given the patient refuses all medications. I have had an extensive discussion with the daughter at bedside regarding her father's care and his wishes with likely transition to hospice and comfort care at a facility in [place] as [facility]. Hospice consulted. ROS: 14 point review of systems is negative except as specifically addressed above.     DIET DYSPHAGIA SOFT AND BITE-SIZED; Mildly Thick (Nectar)    Intake/Output Summary (Last 24 hours) at 4/3/2021 1400  Last data filed at 4/3/2021 1141  Gross per 24 hour   Intake 900 ml   Output    Net 900 ml     Medications:   sodium chloride      sodium chloride      dextrose      sodium chloride      sodium chloride      sodium chloride 75 mL/hr at 04/01/21 2252     Current Facility-Administered Medications   Medication Dose Route Frequency Provider Last Rate Last Admin    0.9 % sodium chloride infusion   Intravenous PRN Latoya Barnett MD        0.9 % sodium chloride infusion   Intravenous PRN Latoya Barnett MD        atorvastatin (LIPITOR) tablet 40 mg  40 mg Oral Nightly Latoya Barnett MD   40 mg at 04/02/21 2036    insulin lispro (HUMALOG) injection vial 0-6 Units  0-6 Units Subcutaneous TID WC Latoya Barnett MD        insulin lispro (HUMALOG) injection vial 0-3 Units  0-3 Units Subcutaneous Nightly Latoya Barnett MD        glucose (GLUTOSE) 40 % oral gel 15 g  15 g Oral PRN Latoya Barnett MD        dextrose 50 % IV solution  12.5 g Intravenous PRN Latoya Barnett MD        glucagon (rDNA) injection 1 mg  1 mg Intramuscular PRN Latoya Barnett MD        dextrose 5 % solution  100 mL/hr Intravenous PRN Latoya Barnett MD        0.9 % sodium chloride infusion   Intravenous PRN Latoya Barnett MD        metoprolol succinate (TOPROL XL) extended release tablet 25 mg  25 mg Oral BID Joyce Rucker MD        [Held by provider] cholestyramine light packet 4 g  4 g Oral Daily Joyce Rucker MD        [Held by provider] lisinopril (PRINIVIL;ZESTRIL) tablet 5 mg  5 mg Oral Daily Joyce Rucker MD        QUEtiapine (SEROQUEL) tablet 25 mg  25 mg Oral Nightly PRN Latoya Barnett MD   25 mg at 04/01/21 2249    [Held by provider] loperamide (IMODIUM) capsule 2 mg  2 mg Oral TID PRN Joyce Rucker MD       [facility] [place] by provider] donepezil (ARICEPT) tablet 10 mg  10 mg Oral Daily Joyce Rucker MD        [Held by provider] dextromethorphan-quiNIDine (NUEDEXTA) 20-10 MG per capsule 1 capsule  1 capsule Oral Daily Joyce Rucker MD        pantoprazole (PROTONIX) injection 40 mg  40 mg Intravenous BID Latoya Barentt MD   40 mg at 04/03/21 0805    And    sodium chloride (PF) 0.9 % injection 10 mL  10 mL Intravenous BID Tay Islas 10\" (1.778 m)   Wt 184 lb 3.2 oz (83.6 kg)   SpO2 96%   BMI 26.43 kg/m²   24HR INTAKE/OUTPUT:      Intake/Output Summary (Last 24 hours) at 4/3/2021 1400  Last data filed at 4/3/2021 1141  Gross per 24 hour   Intake 900 ml   Output    Net 900 ml        General appearance: alert and cooperative with exam  HEENT: AT/NC  Lungs: no increased work of breathing, BLAE, no wheezing appreciated  Heart: RRR, no murmurs appreciated  Abdomen: BS+, soft, NT, ND  Extremities: no edema, pulses+  Neurologic: Alert, gross motor function intact  Skin: warm    Assessment and Plan:   Principal Problem:    GI bleed  Active Problems:    Anemia  Resolved Problems:    * No resolved hospital problems. *    GIB: PPI BID. GI on board. Transfuse PRN. Monitor H/H and transfuse as needed. New onset atrial fibrillation: Cardiology on board. Rate control as tolerated. Monitor on telemetry. CVA/AMS/Dementia: Statin. Hold aspirin for GIB. Neurology on board. Supportive management. PT/OT/SLP. Extensive discussion with the daughter at bedside regarding her father's goals of care and plans after hospitalization with likely transition to hospice care as the patient has no interest in medical compliance including but not limited to medications, imaging, blood draws, treatment. Daughter feels that the patient would also benefit from skilled nursing facility placement in [place] closer to family instead of his current living situation in [facility]. Case management for discharge planning. Hospice consulted. This will unfortunately likely not take place until Monday or Tuesday given the Holiday weekend.     Advance Directive: DNR-CC    DVT prophylaxis: SCDs    Discharge planning: TBD      Signed:  Alexandra Chong MD 4/3/2021 2:00 PM  Rounding Hospitalist

## 2021-04-03 NOTE — CONSULTS
[facility]  Cardiology Consult      Requesting MD:  Cony Jeffery MD   Admit Status:  Inpatient [101]       History obtained from:   ? Patient  ? Other (specify):     PROBLEM LIST:    Patient Active Problem List    Diagnosis Date Noted    Anemia 04/01/2021     Priority: Low    GI bleed 04/01/2021     Priority: Low     Overview Note:     Added automatically from request for surgery [id number]      Palliative care patient 10/23/2020     Priority: Low    Traumatic hemorrhage of right cerebrum ([facility] [address] 10/22/2020     Priority: Low    Essential hypertension 09/05/2019     Priority: Low    B12 deficiency 09/02/2019     Priority: Low    Memory deficit 09/01/2019     Priority: Low    Hard of hearing 09/01/2019     Priority: Low    Dementia ([address] 09/01/2019     Priority: Low    Ataxia involving legs 08/31/2019     Priority: Low    Acute CVA (cerebrovascular accident) ([facility] [address] 08/31/2019     Priority: Low    History of colon cancer 01/22/2019     Priority: Low     Overview Note:     Added automatically from request for surgery [id number]      Benign prostatic hyperplasia with urinary obstruction 10/31/2016     Priority: Low    S/P laparoscopic cholecystectomy 03/27/2012     Priority: Low     1. Altered mental status with abnormal gait and possible left frontal CVA with hemorrhagic component. 2.  Dementia with noncompliance with treatment, refusing all medications, and assisted living. 3.  Hypertension. 4.  Severe anemia with positive stool guaiac and history of prior colon cancer resection 2008. 5.  Hearing loss. 6.  New atrial fibrillation not on anticoagulation, normal LV ejection fraction by echo 4/2/2020, mild LVH, moderate left atrial enlargement, mild MR, spontaneous conversion to sinus.     PRESENTATION: Rolo Casper is a 81y.o. year old male who presents with altered mental status with abnormal gait transferred from assisted living at the [facility] found to be severely anemic with hemoglobin is 5.9, previously 13, transfused 2 units, CT head with possible acute left frontal CVA, MRI with hemorrhagic component with neuro history of possible recent similar left frontal CVA. No prior documented atrial fibrillation. Patient went out endoscopy today and was noted to be in A. fib with RVR. Previously on lisinopril and Toprol but patient has been noncompliant for over 10 months and refusing all medications. On telemetry currently in sinus rhythm with PACs. REVIEW OF SYSTEMS:  Review of Systems   Unable to perform ROS: Dementia   Musculoskeletal: Positive for gait problem. Psychiatric/Behavioral: Positive for confusion. Past Medical History:      Diagnosis Date    Allergic rhinitis     BPH (benign prostatic hyperplasia)     Cholelithiases     Colon cancer (HCC)     Colon polyp     GERD (gastroesophageal reflux disease)     Hearing loss     Hyperlipidemia     Hypertension     Palliative care patient 10/23/2020    Spastic colon        Past Surgical History:      Procedure Laterality Date    APPENDECTOMY      CHOLECYSTECTOMY, LAPAROSCOPIC  2/24/12    COLON SURGERY      colectomy    RECTAL SURGERY      fissure repair x2       Allergies:  Amoxicillin-pot clavulanate    Past Social History:  Social History     Socioeconomic History    Marital status:       Spouse name: Not on file    Number of children: Not on file    Years of education: Not on file    Highest education level: Not on file   Occupational History    Not on file   Social Needs    Financial resource strain: Not on file    Food insecurity     Worry: Not on file     Inability: Not on file    Transportation needs     Medical: Not on file     Non-medical: Not on file   Tobacco Use    Smoking status: Never Smoker    Smokeless tobacco: Never Used   Substance and Sexual Activity    Alcohol use: Yes     Comment: 4 per week    Drug use: Never    Sexual activity: Not on file   Lifestyle    Physical activity     Days per week: Not on file     Minutes per session: Not on file    Stress: Not on file   Relationships    Social connections     Talks on phone: Not on file     Gets together: Not on file     Attends [employer] service: Not on file     Active member of club or organization: Not on file     Attends meetings of clubs or organizations: Not on file     Relationship status: Not on file    Intimate partner violence     Fear of current or ex partner: Not on file     Emotionally abused: Not on file     Physically abused: Not on file     Forced sexual activity: Not on file   Other Topics Concern    Not on file   Social History Narrative    Not on file       Family History:       Problem Relation Age of Onset    Heart Disease Mother     Cancer Father         liver       Home Meds:  Prior to Admission medications    Medication Sig Start Date End Date Taking?  Authorizing Provider   dextromethorphan-quiNIDine (NUEDEXTA) 20-10 MG CAPS per capsule TAKE ONE TABLET BY MOUTH 2 TIMES A DAY 5/5/20   Mahad Degroot MD   donepezil (ARICEPT) 10 MG tablet Take one each am with breakfast 11/27/19   Mahad Degroot MD   loperamide (IMODIUM A-D) 2 MG tablet Take 1 tablet by mouth 3 times daily as needed    Historical Provider, MD   cholestyramine light 4 g packet Take 1 packet by mouth daily 9/6/19   [facility], PA-C   lisinopril (PRINIVIL;ZESTRIL) 5 MG tablet Take 1 tablet by mouth daily 9/5/19   [facility], PA-ARETHA   QUEtiapine (SEROQUEL) 25 MG tablet Take 1 tablet by mouth nightly as needed for Agitation 9/5/19   [facility]   sildenafil (REVATIO) 20 MG tablet Take 20 mg by mouth as needed (take 3 tablets by mouth daily as needed)    Historical Provider, MD   metoprolol succinate (TOPROL XL) 25 MG extended release tablet Take 25 mg by mouth 2 times daily    Historical Provider, MD   omeprazole (PRILOSEC) 20 MG delayed release capsule Take 40 mg by mouth daily    Historical Provider, MD Palpations: Abdomen is soft. There is no mass. Tenderness: There is no abdominal tenderness. There is no guarding or rebound. Comments: No palpable organomegaly   Musculoskeletal:         General: No deformity. Skin:     General: Skin is warm. Coloration: Skin is not pale. Findings: No erythema or rash. Neurological:      Mental Status: He is alert. Motor: No abnormal muscle tone. Coordination: Coordination normal.      Deep Tendon Reflexes: Reflexes normal.           Labs:  Recent Labs     04/02/21  1340 04/02/21  2057 04/03/21  0206   WBC 12.3* 10.1 8.7   HGB 8.7* 7.5* 8.1*    260 286       Recent Labs     04/01/21  1647 04/02/21  0440 04/03/21  0206   * 136 140   K 4.0 3.6 3.9    102 107   CO2 21* 25 26   BUN 29* 23 18   CREATININE 1.0 0.9 0.9   LABGLOM >60 >60 >60   CALCIUM 8.8 8.6* 8.3*       CK, CKMB, Troponin: @LABRCNT (CKTOTAL:3, CKMB:3, TROPONINI:3)@    Last 3 BNP:          IMAGING:  Echo Complete 2d W Doppler W Color    Result Date: 4/2/2021  Transthoracic Echocardiography Report (TTE)  Demographics   Patient Name  Kirby Hood Date of Study          04/02/2021   MRN           [id number]            Gender                 Male   Date of Birth 1935        Room Number            MHL-0430   Age           81 year(s)   Height:       70 inches         Referring Physician    baldomero vallejo   Weight:       184 pounds        Sonographer            Lynn Azevedo [facility]   BSA:          2.01 m^2          Interpreting Physician Ivanna Mcguire MD   BMI:          26.4 kg/m^2  Procedure Type of Study   TTE procedure:ECHO NO CONTRAST WITH DOP/COLR. Study Location: Echo Lab Technical Quality: Adequate visualization Patient Status: Inpatient Contrast Medium: Bubble Study. Indications:CVA. Conclusions   Summary  LV is normal in size with normal systolic function. LV ejection fraction  estimated at 55 to 60%. Mild concentric LVH. Probable grade 2 diastolic dysfunction. advanced chronic small vessel ischemic change with global cortical atrophy. There is an old lacunar infarct in the deep right frontal lobe. No intra-axial or extra-axial hemorrhage. No hydrocephalus. Orbital contents are unremarkable. The paranasal sinuses and mastoids are clear. Calvarium is intact. 1. There is a new 2.8 cm hypodensity cortical/subcortical hypodensity in the anterior left frontal lobe, most suspicious for an acute frontal lobe cortical infarct. Otherwise stable. Signed by Dr Daryl Barclay on 4/1/2021 6:38 PM    Vl Dup Carotid Bilateral    Result Date: 4/3/2021  Vascular Carotid Procedure  Demographics   Patient Name    Yesenia Peter  Age                  81                  R   Patient Number  [id number]           Gender               Male   Visit Number    [id number]        Interpreting         Carlos Patrick MD                                   Physician   Date of Birth   1935       Referring Physician  baldomero vallejo   Accession       [id number]       [address],  Number                                                RVT  Procedure Type of Study:   Cerebral:Carotid, VL CAROTID BILATERAL. Indications for Study:CVA. Risk Factors   - The patient's risk factor(s) include: arterial hypertension. Impression   There is smooth plaque visualized in the bilateral internal carotid  artery(ies). There is less than 50% stenosis in the right internal carotid artery. There is less than 50% stenosis of the left internal carotid artery. There is normal antegrade flow in the bilateral vertebral artery(ies). Signature   ----------------------------------------------------------------  Electronically signed by Carlos Patrick MD(Interpreting  physician) on 04/03/2021 08:16 AM  ----------------------------------------------------------------  Blood Pressure:Left arm 106/64 mmHg.  Velocities are measured in cm/s ; Diameters are measured in mm Carotid Right Measurements +------------+-------+-------+--------+-------+------------+---------------+ ! Location    ! PSV    ! EDV    ! Angle   ! RI     !%Stenosis   ! Tortuosity     ! +------------+-------+-------+--------+-------+------------+---------------+ ! Prox CCA    !59.3   !14.9   !60      !0.75   !            !               ! +------------+-------+-------+--------+-------+------------+---------------+ ! Mid CCA     !63.6   !14.9   !60      !0.77   !            !               ! +------------+-------+-------+--------+-------+------------+---------------+ ! Prox ICA    !68     !17.6   ! 60      !0.74   !            !               ! +------------+-------+-------+--------+-------+------------+---------------+ ! Mid ICA     !68     !17.6   ! 60      !0.74   !            !               ! +------------+-------+-------+--------+-------+------------+---------------+ ! Dist ICA    ! 70.9   !18.8   !60      !0.73   !            !               ! +------------+-------+-------+--------+-------+------------+---------------+ ! Prox ECA    !78.6   !12.9   !60      !0.84   !            !               ! +------------+-------+-------+--------+-------+------------+---------------+ ! Vertebral   !25.2   !7.62   !60      !0.7    ! !               ! +------------+-------+-------+--------+-------+------------+---------------+   - There is antegrade vertebral flow noted on the right side. - Additional Measurements:ICAPSV/CCAPSV 1.2. ICAEDV/CCAEDV 1.26. Carotid Left Measurements +------------+-------+-------+--------+-------+------------+---------------+ ! Location    ! PSV    ! EDV    ! Angle   ! RI     !%Stenosis   ! Tortuosity     ! +------------+-------+-------+--------+-------+------------+---------------+ ! Prox CCA    !63.9   !20.5   !60      !0.68   !            !               ! +------------+-------+-------+--------+-------+------------+---------------+ ! Mid CCA     !61.6   !14.7   !60      !0.76   !            !               ! +------------+-------+-------+--------+-------+------------+---------------+ ! Prox ICA    !54.6   !14.5   !60      !0.73   !            !               ! +------------+-------+-------+--------+-------+------------+---------------+ ! Mid ICA     !76.2   !26.7   ! 60      !0.65   !            !               ! +------------+-------+-------+--------+-------+------------+---------------+ ! Dist ICA    ! 69.9   !22.4   !60      !0.68   !            !               ! +------------+-------+-------+--------+-------+------------+---------------+ ! Prox ECA    !66.3   !9.97   !60      !0.85   !            !               ! +------------+-------+-------+--------+-------+------------+---------------+ ! Vertebral   !39.3   !9.82   !60      !0.75   !            !               ! +------------+-------+-------+--------+-------+------------+---------------+   - There is antegrade vertebral flow noted on the left side. - Additional Measurements:ICAPSV/CCAPSV 1.19. ICAEDV/CCAEDV 1.3. Mri Brain Wo Contrast    Result Date: 4/2/2021  EXAMINATION: MRI BRAIN WO CONTRAST 4/2/2021 4:54 PM HISTORY: EXAM: MR BRAIN WITHOUT IV CONTRAST COMPARISON: MRI September 1, 2019 HISTORY: 81years-old Male. Left frontal infarct on CT TECHNIQUE: Routine pulse sequences of the brain were obtained without IV contrast. REPORT: The ventricles and cerebrospinal fluid spaces are normal in size and configuration for the patient's age. There is no evidence of mass-effect or midline shift. Restricted diffusion is present in the left frontal infarction. Increased signal is present on the T1 sequence and this represents a hemorrhagic component. In the right frontal lobe T2 shine through is present. A few nonspecific bilateral white matter T2/FLAIR abnormal signal, with no diffusion restriction, but likely reflecting chronic microvascular changes. A right paraventricular lacunar infarction is noted.  This was present on CT scan April 1, 2021 The brainstem, sella, pituitary, cerebellum are unremarkable. The basilar cisterns are preserved. The intraorbital structures are unremarkable. The flow voids are preserved. No acute osseous lesion. The visualized portions of the paranasal sinuses are unremarkable. A right mastoid effusion is present. .     1. Hemorrhagic component is now associated with the left frontal infarction. 2. Right mastoid effusion 3. T2 shine through is noted in the right frontal cortex. 4. Changes of aging Signed by Dr Qi Benjamin on 4/2/2021 5:01 PM    Egd    Result Date: 4/3/2021  No dictation     Egd    Result Date: 4/2/2021  No dictation     Colonoscopy    Result Date: 4/3/2021  No dictation           Assessment and Plan: This is a 81y.o. year old male with past medical history of dementia, noncompliance with all treatment, lives in assisted living, hypertension, prior colon cancer 2008, prior recent left frontal CVA presenting with confusion and abnormal gait with severe anemia requiring transfusion and guaiac positive stools noted to have atrial fibrillation which is since converted to sinus, normal LV systolic function with grade 2 diastolic dysfunction and moderate left atrial enlargement. 1.  Patient not a candidate for anticoagulation regardless given falls, CVA with hemorrhagic conversion as well as severe anemia without clear etiology. He also has significant dementia and does not take any of his medications. Does not know what they are for and when told likely forgets. Would rate control at this time with Toprol-XL 50 mg once daily which will be restarted. Atrial fibrillation possibly etiology of CVA. 2.  Can continue with colonoscopy/endoscopy as clinically indicated.       Electronically signed by Hi Tucker MD on 4/3/2021 at 1:41 PM

## 2021-04-04 ENCOUNTER — APPOINTMENT (OUTPATIENT)
Dept: CT IMAGING | Age: 86
DRG: 378 | End: 2021-04-04
Payer: MEDICARE

## 2021-04-04 LAB
ANION GAP SERPL CALCULATED.3IONS-SCNC: 9 MMOL/L (ref 7–19)
BUN BLDV-MCNC: 15 MG/DL (ref 8–23)
CALCIUM SERPL-MCNC: 8.1 MG/DL (ref 8.8–10.2)
CHLORIDE BLD-SCNC: 105 MMOL/L (ref 98–111)
CO2: 25 MMOL/L (ref 22–29)
CREAT SERPL-MCNC: 0.8 MG/DL (ref 0.5–1.2)
GFR AFRICAN AMERICAN: >59
GFR NON-AFRICAN AMERICAN: >60
GLUCOSE BLD-MCNC: 110 MG/DL (ref 70–99)
GLUCOSE BLD-MCNC: 86 MG/DL (ref 74–109)
HCT VFR BLD CALC: 26.3 % (ref 42–52)
HCT VFR BLD CALC: 27 % (ref 42–52)
HEMOGLOBIN: 7.9 G/DL (ref 14–18)
HEMOGLOBIN: 8.1 G/DL (ref 14–18)
MCH RBC QN AUTO: 27.6 PG (ref 27–31)
MCH RBC QN AUTO: 28 PG (ref 27–31)
MCHC RBC AUTO-ENTMCNC: 30 G/DL (ref 33–37)
MCHC RBC AUTO-ENTMCNC: 30 G/DL (ref 33–37)
MCV RBC AUTO: 91.8 FL (ref 80–94)
MCV RBC AUTO: 93.3 FL (ref 80–94)
PDW BLD-RTO: 14 % (ref 11.5–14.5)
PDW BLD-RTO: 14.2 % (ref 11.5–14.5)
PERFORMED ON: ABNORMAL
PLATELET # BLD: 263 K/UL (ref 130–400)
PLATELET # BLD: 282 K/UL (ref 130–400)
PMV BLD AUTO: 10 FL (ref 9.4–12.4)
PMV BLD AUTO: 9.4 FL (ref 9.4–12.4)
POTASSIUM REFLEX MAGNESIUM: 3.8 MMOL/L (ref 3.5–5)
RBC # BLD: 2.82 M/UL (ref 4.7–6.1)
RBC # BLD: 2.94 M/UL (ref 4.7–6.1)
SODIUM BLD-SCNC: 139 MMOL/L (ref 136–145)
WBC # BLD: 8.1 K/UL (ref 4.8–10.8)
WBC # BLD: 9.7 K/UL (ref 4.8–10.8)

## 2021-04-04 PROCEDURE — 6370000000 HC RX 637 (ALT 250 FOR IP): Performed by: INTERNAL MEDICINE

## 2021-04-04 PROCEDURE — 1210000000 HC MED SURG R&B

## 2021-04-04 PROCEDURE — C9113 INJ PANTOPRAZOLE SODIUM, VIA: HCPCS | Performed by: INTERNAL MEDICINE

## 2021-04-04 PROCEDURE — 51798 US URINE CAPACITY MEASURE: CPT

## 2021-04-04 PROCEDURE — 6370000000 HC RX 637 (ALT 250 FOR IP): Performed by: STUDENT IN AN ORGANIZED HEALTH CARE EDUCATION/TRAINING PROGRAM

## 2021-04-04 PROCEDURE — 36415 COLL VENOUS BLD VENIPUNCTURE: CPT

## 2021-04-04 PROCEDURE — 99232 SBSQ HOSP IP/OBS MODERATE 35: CPT | Performed by: INTERNAL MEDICINE

## 2021-04-04 PROCEDURE — 99233 SBSQ HOSP IP/OBS HIGH 50: CPT | Performed by: PSYCHIATRY & NEUROLOGY

## 2021-04-04 PROCEDURE — 74178 CT ABD&PLV WO CNTR FLWD CNTR: CPT

## 2021-04-04 PROCEDURE — 51701 INSERT BLADDER CATHETER: CPT

## 2021-04-04 PROCEDURE — 82947 ASSAY GLUCOSE BLOOD QUANT: CPT

## 2021-04-04 PROCEDURE — 6360000004 HC RX CONTRAST MEDICATION: Performed by: INTERNAL MEDICINE

## 2021-04-04 PROCEDURE — 2580000003 HC RX 258: Performed by: INTERNAL MEDICINE

## 2021-04-04 PROCEDURE — 85027 COMPLETE CBC AUTOMATED: CPT

## 2021-04-04 PROCEDURE — 6360000002 HC RX W HCPCS: Performed by: INTERNAL MEDICINE

## 2021-04-04 PROCEDURE — 80048 BASIC METABOLIC PNL TOTAL CA: CPT

## 2021-04-04 RX ORDER — OXYMETAZOLINE HYDROCHLORIDE 0.05 G/100ML
2 SPRAY NASAL 2 TIMES DAILY
Status: DISPENSED | OUTPATIENT
Start: 2021-04-04 | End: 2021-04-06

## 2021-04-04 RX ORDER — OXYMETAZOLINE HYDROCHLORIDE 0.05 G/100ML
2 SPRAY NASAL 2 TIMES DAILY
Status: DISCONTINUED | OUTPATIENT
Start: 2021-04-04 | End: 2021-04-04

## 2021-04-04 RX ORDER — TAMSULOSIN HYDROCHLORIDE 0.4 MG/1
0.4 CAPSULE ORAL DAILY
Status: DISCONTINUED | OUTPATIENT
Start: 2021-04-04 | End: 2021-04-06 | Stop reason: HOSPADM

## 2021-04-04 RX ADMIN — OXYMETAZOLINE HCL 2 SPRAY: 0.05 SPRAY NASAL at 12:00

## 2021-04-04 RX ADMIN — TAMSULOSIN HYDROCHLORIDE 0.4 MG: 0.4 CAPSULE ORAL at 16:28

## 2021-04-04 RX ADMIN — IOPAMIDOL 90 ML: 755 INJECTION, SOLUTION INTRAVENOUS at 08:25

## 2021-04-04 NOTE — PROGRESS NOTES
GI  - PROGRESS NOTE    Subjective:   Admit Date: 4/1/2021  PCP: No primary care provider on file. Patient being seen for: Profound anemia; positive fecal occult blood    24hr events/Interval History: Patient with no evidence of GI bleed. Significant nosebleeds overnight. Per the patient's daughter who is at the bedside he has been having these nosebleeds over the last several months. They are heavy enough to saturate garments.      DIET DYSPHAGIA SOFT AND BITE-SIZED; Mildly Thick (Nectar)     Tolerated                  Pain:None  Nausea:None      Medications:  Scheduled Meds:   oxymetazoline  2 spray Each Nostril BID    atorvastatin  40 mg Oral Nightly    insulin lispro  0-6 Units Subcutaneous TID WC    insulin lispro  0-3 Units Subcutaneous Nightly    metoprolol succinate  25 mg Oral BID    [Held by provider] cholestyramine light  4 g Oral Daily    [Held by provider] lisinopril  5 mg Oral Daily    [Held by provider] donepezil  10 mg Oral Daily    [Held by provider] dextromethorphan-quiNIDine  1 capsule Oral Daily    pantoprazole  40 mg Intravenous BID    And    sodium chloride (PF)  10 mL Intravenous BID    sodium chloride flush  10 mL Intravenous 2 times per day       Continuous Infusions:   sodium chloride      sodium chloride      dextrose      sodium chloride      sodium chloride      sodium chloride Stopped (04/04/21 0759)       PRN Meds:.sodium chloride, sodium chloride, glucose, dextrose, glucagon (rDNA), dextrose, sodium chloride, QUEtiapine, [Held by provider] loperamide, sodium chloride flush, sodium chloride, promethazine **OR** ondansetron, acetaminophen **OR** acetaminophen, labetalol      Labs:     Recent Labs     04/03/21  2103 04/04/21  0209 04/04/21  1328   WBC 10.4 9.7 8.1   RBC 2.83* 2.82* 2.94*   HGB 7.9* 7.9* 8.1*   HCT 26.3* 26.3* 27.0*   MCV 92.9 93.3 91.8   MCH 27.9 28.0 27.6   MCHC 30.0* 30.0* 30.0*    282 263 Recent Labs     04/02/21  0440 04/03/21  0206 04/04/21  0209    140 139   K 3.6 3.9 3.8   ANIONGAP 9 7 9    107 105   CO2 25 26 25   BUN 23 18 15   CREATININE 0.9 0.9 0.8   GLUCOSE 107 94 86   CALCIUM 8.6* 8.3* 8.1*     No results for input(s): MG, PHOS in the last 72 hours. Recent Labs     04/01/21  1647   AST 99*   ALT 29   BILITOT 0.4   ALKPHOS 36*     HgBA1c:  No components found for: HGBA1C  FLP:    Lab Results   Component Value Date    TRIG 53 04/02/2021    HDL 59 04/02/2021    LDLCALC 59 04/02/2021     TSH:  No results found for: TSH  Troponin T: No results for input(s): TROPONINI in the last 72 hours. INR:   Recent Labs     04/01/21  1647   INR 1.15       No results for input(s): LIPASE in the last 72 hours. -----------------------------------------------------------------  RAD:       Physical Exam:     Vitals:    04/03/21 1135 04/03/21 1945 04/04/21 0545 04/04/21 0800   BP: 122/74 105/60 117/62 132/82   Pulse: 109 102 86 70   Resp: 16 20 18 14   Temp:  97.8 °F (36.6 °C) 98.2 °F (36.8 °C) 97.4 °F (36.3 °C)   TempSrc:    Temporal   SpO2: 96% 94% 97% 100%   Weight:       Height:         24HR INTAKE/OUTPUT:      Intake/Output Summary (Last 24 hours) at 4/4/2021 1550  Last data filed at 4/4/2021 1039  Gross per 24 hour   Intake 1960 ml   Output    Net 1960 ml     General appearance: alert and cooperative with exam; hard of hearing  Lungs: clear to auscultation bilaterally  Heart: regular rate and rhythm, S1, S2 normal, no murmur, click, rub or gallop  Abdomen: soft, non-tender; bowel sounds normal; no masses,  no organomegaly  Extremities: extremities normal, atraumatic, no cyanosis or edema  Neurologic: No obvious focal neurologic deficits. Impression:       1.)  Fecal occult positive blood  2.)  Anemia    Plan:       Patient is a 71-year-old white male with past medical history significant for dementia who presented with worsening altered mental status.   Upon admission the patient was found to have a hemoglobin of 5.9. Of note this was significantly declined from last hemoglobin in the emergency system. An upper endoscopy performed by myself yesterday was unrevealing. A colonoscopy was attempted, however the prep was extremely poor. I was able to ascend the colonoscope to the transverse colon. I did not see any large masses or concerning lesions. I was able to irrigate and lavage the colon however the views were still inadequate. CEA is pending and CT was reviewed. My recommendation is that the patient have a repeat colonoscopy as an outpatient within the next 3 to 4 weeks with a 2-day prep. Of note patient has been keeping up with his serial colonoscopies for surveillance purposes and they have all been unrevealing.

## 2021-04-04 NOTE — PROGRESS NOTES
[facility]        Hospitalist Progress Note  4/4/2021 10:19 AM  Subjective:   Admit Date: 4/1/2021  PCP: No primary care provider on file. Chief Complaint: AMS    Subjective: Patient seen and examined at bedside. Feels well. Wants to be at home. Comfortable. [facility] History: 81-year-old male with a past medical history of dementia, recent previous traumatic brain hemorrhage, hypertension, history of colon cancer presenting to the hospital for worsening mental status and abnormal gait found to be anemic with FOBT+ stool with gastroenterology consulted from the ER. Patient also noted to have acute CVA noted on CT head while in the emergency department. Neurology and gastroenterology consulted. Patient status post MRI brain showing hemorrhagic component of left frontal infarct. EGD/colonoscopy performed 4/3/2021; EGD showing esophagitis and gastritis; colonoscopy with poor preparation recommendation for repeat colonoscopy at a later date. Patient with new onset atrial fibrillation while in the endoscopy suite with cardiology consulted recommending rate control as tolerated given the patient refuses all medications. I have had an extensive discussion with the daughter at bedside regarding her father's care and his wishes with likely transition to hospice and comfort care at a facility in [place] as able. Hospice consulted. ROS: 14 point review of systems is negative except as specifically addressed above.     DIET DYSPHAGIA SOFT AND BITE-SIZED; Mildly Thick (Nectar)    Intake/Output Summary (Last 24 hours) at 4/4/2021 1019  Last data filed at 4/3/2021 1952  Gross per 24 hour   Intake 2610 ml   Output    Net 2610 ml     Medications:   sodium chloride      sodium chloride      dextrose      sodium chloride      sodium chloride      sodium chloride Stopped (04/04/21 0759)     Current Facility-Administered Medications   Medication Dose Route Frequency Provider Last Rate Last Admin    0.9 % sodium chloride infusion   Intravenous CAIO Phoenix MD        0.9 % sodium chloride infusion   Intravenous CAIO Phoenix MD        atorvastatin (LIPITOR) tablet 40 mg  40 mg Oral Nightly Neeru Phoenix MD   40 mg at 04/03/21 2003    insulin lispro (HUMALOG) injection vial 0-6 Units  0-6 Units Subcutaneous TID WC Neeru Phoenix MD        insulin lispro (HUMALOG) injection vial 0-3 Units  0-3 Units Subcutaneous Nightly Neeru Phoenix MD        glucose (GLUTOSE) 40 % oral gel 15 g  15 g Oral PRSAMARA Phoenix MD        dextrose 50 % IV solution  12.5 g Intravenous PRSAMARA Phoenix MD        glucagon (rDNA) injection 1 mg  1 mg Intramuscular PRSAMARA Phoenix MD        dextrose 5 % solution  100 mL/hr Intravenous CAIO Phoenix MD        0.9 % sodium chloride infusion   Intravenous CAIO Phoenix MD        metoprolol succinate (TOPROL XL) extended release tablet 25 mg  25 mg Oral BID Ananya Ceron MD   25 mg at 04/03/21 2000    [Held by provider] cholestyramine light packet 4 g  4 g Oral Daily Ananya Ceron MD       [facility] [place] by provider] lisinopril (PRINIVIL;ZESTRIL) tablet 5 mg  5 mg Oral Daily Ananya Ceron MD        QUEtiapine (SEROQUEL) tablet 25 mg  25 mg Oral Nightly PRN Neeru Phoenix MD   25 mg at 04/01/21 2249    [Held by provider] loperamide (IMODIUM) capsule 2 mg  2 mg Oral TID PRN Ananya Ceron MD       [facility] by provider] donepezil (ARICEPT) tablet 10 mg  10 mg Oral Daily Ananya Ceron MD        [Held by provider] dextromethorphan-quiNIDine (NUEDEXTA) 20-10 MG per capsule 1 capsule  1 capsule Oral Daily Ananya Ceron MD        pantoprazole (PROTONIX) injection 40 mg  40 mg Intravenous BID Neeru Phoenix MD   40 mg at 04/03/21 2001    And    sodium chloride (PF) 0.9 % injection 10 mL  10 mL Intravenous BID Neeru Phoenix MD   10 mL at 04/03/21 2530    sodium chloride flush 0.9 % injection 10 mL  10 mL Intake/Output Summary (Last 24 hours) at 4/4/2021 1019  Last data filed at 4/3/2021 1952  Gross per 24 hour   Intake 2610 ml   Output    Net 2610 ml        General appearance: alert and cooperative with exam  HEENT: AT/NC  Lungs: no increased work of breathing, BLAE, no wheezing appreciated  Heart: RRR, no murmurs appreciated  Abdomen: BS+, soft, NT, ND  Extremities: no edema, pulses+  Neurologic: Alert, gross motor function intact  Skin: warm    Assessment and Plan:   Principal Problem:    GI bleed  Active Problems:    Anemia  Resolved Problems:    * No resolved hospital problems. *    GIB: PPI BID. GI on board. Transfuse PRN. Monitor H/H and transfuse as needed. New onset atrial fibrillation: Cardiology on board. Rate control as tolerated. Monitor on telemetry. CVA/AMS/Dementia: Statin. Hold aspirin for GIB. Neurology on board. Supportive management. PT/OT/SLP. Urinary retention: Bladder scan and straight cath as needed. Hospice consult pending.     Advance Directive: DNR-CC    DVT prophylaxis: SCDs    Discharge planning: TBD      Signed:  Loren Brown MD 4/4/2021 10:19 AM  [facility]

## 2021-04-04 NOTE — PROGRESS NOTES
Was called to the bedside that the patient had a nose bleed. He saturated 2 wash clothes within 3 minutes. Dr. Shimon Cao notified and orders received. Ice pack placed to back of neck and nasal bridge. Pressure held to nasal bridge as well. Update: Bleeding slowed at this time. Large clot noted.  Medication given

## 2021-04-04 NOTE — PROGRESS NOTES
[id number] [facility] Neurology  [address] [place] [address] Drive, [address] [place] [address]  Phone [phone]     Neurology Progress Note  2021 10:51 PM  Information:   Patient Name: Jenelle Humphrey  :   1935  Age:   81 y.o. MRN:   [id number]  Account #:  [de-identified]  Admit Date:   2021  Today:  21     ADMIT DX:   GI bleed    Subjective:     Jenelle Humphrey is a 81y.o. year old man with a history of stroke, cerebral hemorrhage, and epistaxi who was admitted  with altered mental status, new gait difficulties, and anemia. Interval History:   He has been more cooperative but still confused. He had a nose bleed earlier. Objective:     Past Medical History:  Past Medical History:   Diagnosis Date    Allergic rhinitis     BPH (benign prostatic hyperplasia)     Cholelithiases     Colon cancer (HCC)     Colon polyp     GERD (gastroesophageal reflux disease)     Hearing loss     Hyperlipidemia     Hypertension     Palliative care patient 10/23/2020    Spastic colon        Past Surgical History:   Procedure Laterality Date    APPENDECTOMY      CHOLECYSTECTOMY, LAPAROSCOPIC  12    COLON SURGERY      colectomy    RECTAL SURGERY      fissure repair x2       Family History   Problem Relation Age of Onset    Heart Disease Mother     Cancer Father         liver       Social History     Socioeconomic History    Marital status:       Spouse name: Not on file    Number of children: Not on file    Years of education: Not on file    Highest education level: Not on file   Occupational History    Not on file   Social Needs    Financial resource strain: Not on file    Food insecurity     Worry: Not on file     Inability: Not on file    Transportation needs     Medical: Not on file     Non-medical: Not on file   Tobacco Use    Smoking status: Never Smoker    Smokeless tobacco: Never Used   Substance and Sexual Activity    Alcohol use: Yes     Comment: 4 per week    Drug use: Never    Sexual activity: Not on file   Lifestyle    Physical activity     Days per week: Not on file     Minutes per session: Not on file    Stress: Not on file   Relationships    Social connections     Talks on phone: Not on file     Gets together: Not on file     Attends Synagogue service: Not on file     Active member of club or organization: Not on file     Attends meetings of clubs or organizations: Not on file     Relationship status: Not on file    Intimate partner violence     Fear of current or ex partner: Not on file     Emotionally abused: Not on file     Physically abused: Not on file     Forced sexual activity: Not on file   Other Topics Concern    Not on file   Social History Narrative    Not on file       Medications:   sodium chloride      sodium chloride      dextrose      sodium chloride      sodium chloride      sodium chloride Stopped (04/04/21 0759)      oxymetazoline  2 spray Each Nostril BID    tamsulosin  0.4 mg Oral Daily    atorvastatin  40 mg Oral Nightly    insulin lispro  0-6 Units Subcutaneous TID WC    insulin lispro  0-3 Units Subcutaneous Nightly    metoprolol succinate  25 mg Oral BID    [Held by provider] cholestyramine light  4 g Oral Daily    [Held by provider] lisinopril  5 mg Oral Daily    [Held by provider] donepezil  10 mg Oral Daily    [Held by provider] dextromethorphan-quiNIDine  1 capsule Oral Daily    pantoprazole  40 mg Intravenous BID    And    sodium chloride (PF)  10 mL Intravenous BID    sodium chloride flush  10 mL Intravenous 2 times per day       Diagnostic Studies:  Reviewed all labs/diagnositcs since last 24hrs:  Recent Results (from the past 24 hour(s))   CBC    Collection Time: 04/04/21  2:09 AM   Result Value Ref Range    WBC 9.7 4.8 - 10.8 K/uL    RBC 2.82 (L) 4.70 - 6.10 M/uL    Hemoglobin 7.9 (L) 14.0 - 18.0 g/dL    Hematocrit 26.3 (L) 42.0 - 52.0 %    MCV 93.3 80.0 - 94.0 fL    MCH 28.0 27.0 - 31.0 pg    MCHC 30.0 (L) 33.0 - 37.0 g/dL RDW 14.2 11.5 - 14.5 %    Platelets 490 708 - 975 K/uL    MPV 10.0 9.4 - 12.4 fL   Basic Metabolic Panel w/ Reflex to MG    Collection Time: 04/04/21  2:09 AM   Result Value Ref Range    Sodium 139 136 - 145 mmol/L    Potassium reflex Magnesium 3.8 3.5 - 5.0 mmol/L    Chloride 105 98 - 111 mmol/L    CO2 25 22 - 29 mmol/L    Anion Gap 9 7 - 19 mmol/L    Glucose 86 74 - 109 mg/dL    BUN 15 8 - 23 mg/dL    CREATININE 0.8 0.5 - 1.2 mg/dL    GFR Non-African American >60 >60    GFR African American >59 >59    Calcium 8.1 (L) 8.8 - 10.2 mg/dL   POCT Glucose    Collection Time: 04/04/21 11:55 AM   Result Value Ref Range    POC Glucose 110 (H) 70 - 99 mg/dl    Performed on [employer]    CBC    Collection Time: 04/04/21  1:28 PM   Result Value Ref Range    WBC 8.1 4.8 - 10.8 K/uL    RBC 2.94 (L) 4.70 - 6.10 M/uL    Hemoglobin 8.1 (L) 14.0 - 18.0 g/dL    Hematocrit 27.0 (L) 42.0 - 52.0 %    MCV 91.8 80.0 - 94.0 fL    MCH 27.6 27.0 - 31.0 pg    MCHC 30.0 (L) 33.0 - 37.0 g/dL    RDW 14.0 11.5 - 14.5 %    Platelets 213 361 - 603 K/uL    MPV 9.4 9.4 - 12.4 fL     Narrative   EXAMINATION: MRI BRAIN WO CONTRAST 4/2/2021 4:54 PM   HISTORY: EXAM: MR BRAIN WITHOUT IV CONTRAST    COMPARISON: MRI September 1, 2019    HISTORY: 81years-old Male. Left frontal infarct on CT    TECHNIQUE:    Routine pulse sequences of the brain were obtained without IV   contrast.    REPORT:    The ventricles and cerebrospinal fluid spaces are normal in size and   configuration for the patient's age. There is no evidence of   mass-effect or midline shift. Restricted diffusion is present in the   left frontal infarction. Increased signal is present on the T1   sequence and this represents a hemorrhagic component. In the right frontal lobe T2 shine through is present. A few nonspecific bilateral white matter T2/FLAIR abnormal signal,   with no diffusion restriction, but likely reflecting chronic   microvascular changes.  A right paraventricular lacunar infarction is   noted. This was present on CT scan April 1, 2021   The brainstem, sella, pituitary, cerebellum are unremarkable. The   basilar cisterns are preserved. The intraorbital structures are   unremarkable. The flow voids are preserved. No acute osseous lesion. The visualized portions of the paranasal sinuses are unremarkable. A right mastoid effusion is present. .        Impression   1. Hemorrhagic component is now associated with the left frontal   infarction. 2. Right mastoid effusion   3. T2 shine through is noted in the right frontal cortex. 4. Changes of aging   Signed by Dr Sariah Nash on 4/2/2021 5:01 PM       Diet:  DIET DYSPHAGIA SOFT AND BITE-SIZED; Mildly Thick (Nectar)    Examination:  Vitals: /74   Pulse 80   Temp 98.6 °F (37 °C) (Temporal)   Resp 14   Ht 5' 10\" (1.778 m)   Wt 184 lb 3.2 oz (83.6 kg)   SpO2 96%   BMI 26.43 kg/m²      Intake/Output Summary (Last 24 hours) at 4/4/2021 2251  Last data filed at 4/4/2021 1626  Gross per 24 hour   Intake 250 ml   Output 1750 ml   Net -1500 ml     General appearance: He is an elderly man who is confused  Skin: Skin color, texture, turgor normal. No rashes or lesions  HEENT: Head: Normal, normocephalic, atraumatic. Neck:no adenopathy, no carotid bruit, no JVD, supple, symmetrical, trachea midline and thyroid not enlarged, symmetric, no tenderness/mass/nodules  Lungs: clear to auscultation bilaterally  Heart: regular rate and rhythm, S1, S2 normal, no murmur, click, rub or gallop  Abdomen: soft, non-tender; bowel sounds normal; no masses,  no organomegaly  Extremities: extremities normal, atraumatic, no cyanosis or edema  Neurologic:  Alert. Oriented to [facility], 3/2021. He exhibits very poor memory and some confusion. He repeats the same partial story several times but is unable to complete it. No dysarthria. Speech is fluent. EOMI, PERRL, VFF. No facial weakness. Limb strength is normal.  No pronator drift.   Rapid alternating movements are normal.  Finger to nose testing shows no dysmetria. Assessment:   1. Left frontal stroke, new since 10/2020 but does not appear acute. This appears to be the residua of the left frontal hemorrhage. 2.         Anemia  3. H/O stroke and cerebral hemorrhage  4. Dementia with behavioral difficulties. I had a long discussion with his daughter regarding this. There is no reason to start one of the dementia medication given that he will not take medications. Plan:   1. The plans are to move him into an assisted living facility in [place] with 24/7 sitters/care. 2. Not much further to add. I am signing off. Call if needed. FU as needed. Discharge planning:   As above    Reviewed treatment plans with the patient and/or family. 35 minutes spent in face to face interaction and coordination of care.      Electronically signed by Zeyad Brewster MD on 4/4/2021 at 10:51 PM

## 2021-04-05 LAB
BLOOD BANK DISPENSE STATUS: NORMAL
BLOOD BANK PRODUCT CODE: NORMAL
BPU ID: NORMAL
DESCRIPTION BLOOD BANK: NORMAL
GLUCOSE BLD-MCNC: 156 MG/DL (ref 70–99)
HCT VFR BLD CALC: 24.6 % (ref 42–52)
HCT VFR BLD CALC: 26.7 % (ref 42–52)
HEMOGLOBIN: 7.7 G/DL (ref 14–18)
HEMOGLOBIN: 8.1 G/DL (ref 14–18)
MCH RBC QN AUTO: 27.6 PG (ref 27–31)
MCH RBC QN AUTO: 28.1 PG (ref 27–31)
MCHC RBC AUTO-ENTMCNC: 30.3 G/DL (ref 33–37)
MCHC RBC AUTO-ENTMCNC: 31.3 G/DL (ref 33–37)
MCV RBC AUTO: 89.8 FL (ref 80–94)
MCV RBC AUTO: 90.8 FL (ref 80–94)
PDW BLD-RTO: 13.7 % (ref 11.5–14.5)
PDW BLD-RTO: 13.7 % (ref 11.5–14.5)
PERFORMED ON: ABNORMAL
PLATELET # BLD: 264 K/UL (ref 130–400)
PLATELET # BLD: 276 K/UL (ref 130–400)
PMV BLD AUTO: 9.2 FL (ref 9.4–12.4)
PMV BLD AUTO: 9.9 FL (ref 9.4–12.4)
RBC # BLD: 2.74 M/UL (ref 4.7–6.1)
RBC # BLD: 2.94 M/UL (ref 4.7–6.1)
WBC # BLD: 5.7 K/UL (ref 4.8–10.8)
WBC # BLD: 6 K/UL (ref 4.8–10.8)

## 2021-04-05 PROCEDURE — 1210000000 HC MED SURG R&B

## 2021-04-05 PROCEDURE — 97110 THERAPEUTIC EXERCISES: CPT

## 2021-04-05 PROCEDURE — C9113 INJ PANTOPRAZOLE SODIUM, VIA: HCPCS | Performed by: INTERNAL MEDICINE

## 2021-04-05 PROCEDURE — 36415 COLL VENOUS BLD VENIPUNCTURE: CPT

## 2021-04-05 PROCEDURE — 6370000000 HC RX 637 (ALT 250 FOR IP): Performed by: INTERNAL MEDICINE

## 2021-04-05 PROCEDURE — 82947 ASSAY GLUCOSE BLOOD QUANT: CPT

## 2021-04-05 PROCEDURE — 2580000003 HC RX 258: Performed by: INTERNAL MEDICINE

## 2021-04-05 PROCEDURE — 85027 COMPLETE CBC AUTOMATED: CPT

## 2021-04-05 PROCEDURE — 6370000000 HC RX 637 (ALT 250 FOR IP): Performed by: STUDENT IN AN ORGANIZED HEALTH CARE EDUCATION/TRAINING PROGRAM

## 2021-04-05 PROCEDURE — 97116 GAIT TRAINING THERAPY: CPT

## 2021-04-05 PROCEDURE — 92610 EVALUATE SWALLOWING FUNCTION: CPT

## 2021-04-05 PROCEDURE — 6360000002 HC RX W HCPCS: Performed by: INTERNAL MEDICINE

## 2021-04-05 RX ADMIN — SODIUM CHLORIDE, PRESERVATIVE FREE 10 ML: 5 INJECTION INTRAVENOUS at 22:05

## 2021-04-05 RX ADMIN — SODIUM CHLORIDE, PRESERVATIVE FREE 10 ML: 5 INJECTION INTRAVENOUS at 22:02

## 2021-04-05 RX ADMIN — METOPROLOL SUCCINATE 25 MG: 25 TABLET, EXTENDED RELEASE ORAL at 22:02

## 2021-04-05 RX ADMIN — TAMSULOSIN HYDROCHLORIDE 0.4 MG: 0.4 CAPSULE ORAL at 07:46

## 2021-04-05 RX ADMIN — PANTOPRAZOLE SODIUM 40 MG: 40 INJECTION, POWDER, FOR SOLUTION INTRAVENOUS at 22:01

## 2021-04-05 RX ADMIN — ATORVASTATIN CALCIUM 40 MG: 40 TABLET, FILM COATED ORAL at 22:02

## 2021-04-05 RX ADMIN — OXYMETAZOLINE HCL 2 SPRAY: 0.05 SPRAY NASAL at 22:02

## 2021-04-05 NOTE — PROGRESS NOTES
Patient allowed me to bladder scan him. Bladder scan read 747 ml. Straight cath performed. 550 ml of clear yellow urine obtained.      Electronically signed by Sushant Tran RN on 4/5/2021 at 3:33 AM

## 2021-04-05 NOTE — CARE COORDINATION
Spoke with pt's dtr, Yuliana Kemp. She stated that they do not want pt to return back to the [facility] in [place], feel as if he would benefit from placement closer to family in [facility]. They are not ready for pt to go to a SNF memory care unit, want pt to go to Assisted Living with 24 hour caregivers. Interested in [place] [facility] [phone]  [facility] [phone]  [address] [facility] at [facility] [phone]    Ysitie 69, left message requesting call back. Called Yolie Staples ([place]) took phone number for Fantasma Sparks (Admissions) as well as a message to call ASAP. Called Barbara, left message Tomás Fontana (Admissions) requesting call back. SW following. Electronically signed by Becky Sanchez on 4/5/2021 at 9:01 AM    Str also agreeable to referral to [address] [place] [address] at the [facility] rehab unit referral for short term before transferring to assisted living with sitters.  SO will send as back up  Ph: [phone]   F: [phone]  Electronically signed by Becky Sanchez on 4/5/2021 at 10:41 AM

## 2021-04-05 NOTE — CARE COORDINATION
Met with pt's dtr, Gerardo Thrasher. Presented options, pt could dc back to [facility] with sitters before transitioning to [facility] in [place], or dc to [facility] in [place] as a \"stepping stone\" to move to [facility] in [place]. She phoned her sister, Anabella Whitten, they have decided that pt should dc to [facility] in [place]. Spoke with Nancy Kincaid, admissions at [facility] [place], she stated that they would not be able to accept pt \"skilled\", she is going to reach out to pt's dtr's re: financial paperwork/acceptance to their facility. [facility]   Ph: [phone]   F: [address] [place] [address] Dr: [phone]  SW following.   Electronically signed by Miko Staley on 4/5/2021 at 2:41 PM

## 2021-04-05 NOTE — PROGRESS NOTES
[facility]        Hospitalist Progress Note  4/5/2021 12:36 PM  Subjective:   Admit Date: 4/1/2021  PCP: No primary care provider on file. Chief Complaint: AMS    Subjective: Patient seen and examined at bedside. Eating lunch. Comfortable. [facility] History: 28-year-old male with a past medical history of dementia, recent previous traumatic brain hemorrhage, hypertension, history of colon cancer presenting to the hospital for worsening mental status and abnormal gait found to be anemic with FOBT+ stool with gastroenterology consulted from the ER. Patient also noted to have acute CVA noted on CT head while in the emergency department. Neurology and gastroenterology consulted. Patient status post MRI brain showing hemorrhagic component of left frontal infarct. EGD/colonoscopy performed 4/3/2021; EGD showing esophagitis and gastritis; colonoscopy with poor preparation recommendation for repeat colonoscopy at a later date. Patient with new onset atrial fibrillation while in the endoscopy suite with cardiology consulted recommending rate control as tolerated given the patient refuses all medications. I have had an extensive discussion with the daughter at bedside regarding her father's care and his wishes with likely transition to hospice and comfort care at a facility in [place] as able. Hospice consulted. ROS: 14 point review of systems is negative except as specifically addressed above. DIET DYSPHAGIA SOFT AND BITE-SIZED;     Intake/Output Summary (Last 24 hours) at 4/5/2021 1236  Last data filed at 4/4/2021 1626  Gross per 24 hour   Intake    Output 1750 ml   Net -1750 ml     Medications:   sodium chloride      sodium chloride      dextrose      sodium chloride      sodium chloride      sodium chloride Stopped (04/04/21 0759)     Current Facility-Administered Medications   Medication Dose Route Frequency Provider Last Rate Last Admin    oxymetazoline (AFRIN) 0.05 % nasal spray 2 spray  2 spray Each Nostril BID Sam Fajardo MD   2 spray at 04/04/21 1200    tamsulosin (FLOMAX) capsule 0.4 mg  0.4 mg Oral Daily Sam Fajardo MD   0.4 mg at 04/04/21 1628    0.9 % sodium chloride infusion   Intravenous PRN Joaquim Galan MD        0.9 % sodium chloride infusion   Intravenous PRN Joaquim Galan MD        atorvastatin (LIPITOR) tablet 40 mg  40 mg Oral Nightly Joaquim Galan MD   40 mg at 04/03/21 2003    insulin lispro (HUMALOG) injection vial 0-6 Units  0-6 Units Subcutaneous TID WC Joaquim Galan MD        insulin lispro (HUMALOG) injection vial 0-3 Units  0-3 Units Subcutaneous Nightly Joaquim Galan MD        glucose (GLUTOSE) 40 % oral gel 15 g  15 g Oral PRN Joaquim Galan MD        dextrose 50 % IV solution  12.5 g Intravenous PRN Joaquim Galan MD        glucagon (rDNA) injection 1 mg  1 mg Intramuscular PRN Joaquim Galan MD        dextrose 5 % solution  100 mL/hr Intravenous PRN Joaquim Galan MD        0.9 % sodium chloride infusion   Intravenous PRN Joaquim Galan MD        metoprolol succinate (TOPROL XL) extended release tablet 25 mg  25 mg Oral BID Gema Lagos MD   25 mg at 04/03/21 2000    [Held by provider] cholestyramine light packet 4 g  4 g Oral Daily Gema Lagos MD       [facility] by provider] lisinopril (PRINIVIL;ZESTRIL) tablet 5 mg  5 mg Oral Daily Gema Lagos MD        QUEtiapine (SEROQUEL) tablet 25 mg  25 mg Oral Nightly PRN Joaquim Galan MD   25 mg at 04/01/21 2249    [Held by provider] loperamide (IMODIUM) capsule 2 mg  2 mg Oral TID PRN Gema Lagos MD       [facility] by provider] donepezil (ARICEPT) tablet 10 mg  10 mg Oral Daily Gema Lagos MD        [Held by provider] dextromethorphan-quiNIDine (NUEDEXTA) 20-10 MG per capsule 1 capsule  1 capsule Oral Daily Gema Lagos MD        pantoprazole (PROTONIX) injection 40 mg  40 mg Intravenous BID Joaquim Galan MD   40 mg at 04/03/21 2001 And    sodium chloride (PF) 0.9 % injection 10 mL  10 mL Intravenous BID Albert Zambrano MD   10 mL at 04/03/21 0805    sodium chloride flush 0.9 % injection 10 mL  10 mL Intravenous 2 times per day Albert Zambrano MD   10 mL at 04/03/21 2001    sodium chloride flush 0.9 % injection 10 mL  10 mL Intravenous PRN Albert Zambrano MD        0.9 % sodium chloride infusion  25 mL Intravenous PRN Albert Zambrano MD        promethazine (PHENERGAN) tablet 12.5 mg  12.5 mg Oral Q6H PRN Albert Zambrano MD        Or    ondansetron [facility]) injection 4 mg  4 mg Intravenous Q6H PRN Albert Zambrano MD        0.9 % sodium chloride infusion   Intravenous Continuous Albert Zambrano MD   Stopped at 04/04/21 0759    acetaminophen (TYLENOL) tablet 650 mg  650 mg Oral Q4H PRN Albert Zambrano MD        Or   Murr Neither acetaminophen (TYLENOL) suppository 650 mg  650 mg Rectal Q4H PRN Albert Zambrano MD        labetalol (NORMODYNE;TRANDATE) injection 10 mg  10 mg Intravenous Q10 Min PRN Albert Zambrano MD            Labs:     Recent Labs     04/03/21  2103 04/04/21  0209 04/04/21  1328   WBC 10.4 9.7 8.1   RBC 2.83* 2.82* 2.94*   HGB 7.9* 7.9* 8.1*   HCT 26.3* 26.3* 27.0*   MCV 92.9 93.3 91.8   MCH 27.9 28.0 27.6   MCHC 30.0* 30.0* 30.0*    282 263     Recent Labs     04/03/21  0206 04/04/21  0209    139   K 3.9 3.8   ANIONGAP 7 9    105   CO2 26 25   BUN 18 15   CREATININE 0.9 0.8   GLUCOSE 94 86   CALCIUM 8.3* 8.1*     No results for input(s): MG, PHOS in the last 72 hours. No results for input(s): AST, ALT, ALB, BILITOT, ALKPHOS, ALB in the last 72 hours. ABGs:No results for input(s): PH, PO2, PCO2, HCO3, BE, O2SAT in the last 72 hours. Troponin T: No results for input(s): TROPONINI in the last 72 hours. INR:   No results for input(s): INR in the last 72 hours. Lactic Acid: No results for input(s): LACTA in the last 72 hours.     Objective:   Vitals: /68   Pulse 82   Temp 98.4 °F (36.9 °C) Resp 16   Ht 5' 10\" (1.778 m)   Wt 184 lb 3.2 oz (83.6 kg)   SpO2 97%   BMI 26.43 kg/m²   24HR INTAKE/OUTPUT:      Intake/Output Summary (Last 24 hours) at 4/5/2021 1236  Last data filed at 4/4/2021 1626  Gross per 24 hour   Intake    Output 1750 ml   Net -1750 ml        General appearance: alert and cooperative with exam  HEENT: AT/NC  Lungs: no increased work of breathing, BLAE, no wheezing appreciated  Heart: RRR, no murmurs appreciated  Abdomen: BS+, soft, NT, ND  Extremities: no edema, pulses+  Neurologic: Alert, gross motor function intact  Skin: warm    Assessment and Plan:   Principal Problem:    GI bleed  Active Problems:    Anemia  Resolved Problems:    * No resolved hospital problems. *    GIB: PPI BID. GI on board. Transfuse PRN. Monitor H/H and transfuse as needed. New onset atrial fibrillation: Cardiology on board. Rate control as tolerated. Monitor on telemetry. CVA/AMS/Dementia: Statin. Hold aspirin for GIB. Neurology on board. Supportive management. PT/OT/SLP. Urinary retention: Bladder scan and straight cath as needed. Hospice consult pending.     Advance Directive: DNR-CC    DVT prophylaxis: SCDs    Discharge planning: TBD - transition to hospice after hospice evaluation and once placement found      Signed:  Dione Solomon MD 4/5/2021 12:36 PM  Rounding Hospitalist

## 2021-04-05 NOTE — PROGRESS NOTES
04/05/21 1015   Restrictions/Precautions   Restrictions/Precautions Fall Risk   Required Braces or Orthoses? No   General   Chart Reviewed Yes   Subjective   Subjective I am doing fine I am cold   Pain Screening   Patient Currently in Pain No   Intervention List Patient able to continue with treatment   Oxygen Therapy   O2 Device None (Room air)   Pre Treatment Pain Screening   Pain at present 0   Scale Used Numeric Score   Intervention List Patient able to continue with treatment   Bed Mobility   Supine to Sit Modified independent   Sit to Supine Modified independent   Scooting Independent   Transfers   Sit to Stand Stand by assistance   Stand to sit Stand by assistance   Ambulation   Ambulation? Yes   Ambulation 1   Device No Device   Assistance Contact guard assistance   Quality of Gait Fast pace slightly unsteady   Gait Deviations Deviated path   Distance 500'   Comments Patient BTB with warm blanket   Balance   Standing - Static Fair   Standing - Dynamic Fair;-   Exercises   Heelslides 20   Hip Flexion 20   Knee Long Arc Quad 20   Knee Short Arc Quad 20   Knee Active Range of Motion yes   Ankle Pumps 20   Activity Tolerance   Activity Tolerance Patient Tolerated treatment well   Safety Devices   Type of devices Left in bed;Call light within reach; Bed alarm in place   Physical Therapy    Electronically signed by Angelita Farrar PTA on 4/5/2021 at 10:22 AM

## 2021-04-05 NOTE — PROGRESS NOTES
Speech Language Pathology  Facility/Department: [facility] 4 ONCOLOGY UNIT   CLINICAL BEDSIDE SWALLOW EVALUATION    NAME: Regla Silverio  : 1935  MRN: [id number]    ADMISSION DATE: 2021  ADMITTING DIAGNOSIS: has S/P laparoscopic cholecystectomy; Ataxia involving legs; Acute CVA (cerebrovascular accident) ([address] Memory deficit; Hard of hearing; Dementia ([address] B12 deficiency; Essential hypertension; Traumatic hemorrhage of right cerebrum ([address] Benign prostatic hyperplasia with urinary obstruction; History of colon cancer; Palliative care patient; Anemia; and GI bleed on their problem list.  ONSET DATE: 21      Date of Eval: 2021  Evaluating Therapist: Romel Frazier    Current Diet level:  Current Diet : Dysphagia Soft and Bite-Sized (Dysphagia III)  Current Liquid Diet : Mildly Thick (Nectar)      Primary Complaint  Patient Complaint: Pt reported no difficulty with swallowing. Pain:  Pain Assessment  Pain Assessment: 0-10  Pain Level: 0  Non-Pharmaceutical Pain Intervention(s): Ambulation/Increased Activity, Repositioned    Reason for Referral  Regla Silverio was referred for a bedside swallow evaluation to assess the efficiency of his swallow function, identify signs and symptoms of aspiration and make recommendations regarding safe dietary consistencies, effective compensatory strategies, and safe eating environment. Impression  Dysphagia Diagnosis: Swallow function appears grossly intact  Dysphagia Impression : Pt's swallow function appears to be [place]/[facility] at this time. Dysphagia Outcome Severity Scale: Level 6: Within functional limits/Modified independence     Treatment Plan  Requires SLP Intervention: No  Duration/Frequency of Treatment: no treatment needed; evaluation only          Recommended Diet and Intervention  Diet Solids Recommendation: Dysphagia Soft and Bite-Sized (Dysphagia III)  Liquid Consistency Recommendation:  Thin  Recommended Form of Meds: Meds in puree Compensatory Swallowing Strategies  Compensatory Swallowing Strategies: Alternate solids and liquids;Small bites/sips; Remain upright for 30-45 minutes after meals    Treatment/Goals  Long-term Goals  Timeframe for Long-term Goals: no treatment needed; evaluation only    General  Chart Reviewed: Yes  Subjective  Subjective: Pt was cooperative but very confused. Pt has a history of dementia. Behavior/Cognition: Alert; Cooperative;Confused; Requires cueing  Respiratory Status: Room air  O2 Device: None (Room air)  Communication Observation: Functional  Follows Directions: Simple  Dentition: Adequate  Patient Positioning: Upright in bed(He was seated on the side of the bed.)  Baseline Vocal Quality: Normal  Prior Dysphagia History: Pt unable to report. Pt was placed on NTL at admission. Consistencies Administered: Reg solid; Dysphagia Soft and Bite-Sized (Dysphagia III); Dysphagia Pureed (Dysphagia I); Thin - cup; Thin - teaspoon; Ice Chips           Vision/Hearing  Hearing  Hearing: Exceptions to [facility]  Hearing Exceptions: Hard of hearing/hearing concerns; No hearing aid    Oral Motor Deficits  Oral/Motor  Oral Motor: Within functional limits    Oral Phase Dysfunction  Oral Phase  Oral Phase - Comment: Pt demo adequate mastication with ice chips and regular solid consistency. Indicators of Pharyngeal Phase Dysfunction   Pharyngeal Phase  Pharyngeal Phase: WFL  Pharyngeal Phase   Pharyngeal: Pt demo good laryngeal elevation with all PO trials. Pt demo no outward S/S of aspiration/penetration with any PO trials.     Prognosis  Prognosis  Prognosis for safe diet advancement: good  Individuals consulted  Consulted and agree with results and recommendations: Patient;RN    Education  Patient Education Response: Needs reinforcement            Yisroel Lundborg, SLP  4/5/2021 11:07 AM

## 2021-04-05 NOTE — PROGRESS NOTES
Patient oriented to person only this morning. He is very upset that I have offered him medication. He has repeatedly explained this morning that he does not want any medications. He continues to tell me that his blood pressure if fine and that he can pee just fine. I explained that he has urinary retention and an abnormal heart beat. He still refuses his medication. He also has refused to check his blood sugar.  The patient continues to tell us he doesn't want \"all this stuff done\"

## 2021-04-05 NOTE — CARE COORDINATION
Spoke with Edgar Villafuerte, admissions at [facility] in [place]. She stated that they do have openings in both their memory care unit and their assisted living unit. Pt would not require a COVID test to dc to this facility, they are 100% vaccinated on site. He will require a within 1 year TB skin test. They would do a virtual assessment with the director of nursing. Lupillo Espinal would like pt's dtr, Jaskaran Link, to reach out to her to discuss pt's case in further detail. Angélica's cell is [phone].    Electronically signed by Renetta Esparza on 4/5/2021 at 10:40 AM

## 2021-04-05 NOTE — PROGRESS NOTES
Patient refusing to let this RN check his blood sugar and refusing all of his medications at this time. Pt also refusing to allow lab to draw blood. I asked the pt if I could bladder scan him, pt stated \"I am trying to sleep, just do all of this in the day time instead of waking me up to do this stuff in the middle of the night\".     Electronically signed by González Kaur RN on 4/4/2021 at 10:09 PM

## 2021-04-06 VITALS
DIASTOLIC BLOOD PRESSURE: 75 MMHG | TEMPERATURE: 98.2 F | HEIGHT: 70 IN | SYSTOLIC BLOOD PRESSURE: 137 MMHG | WEIGHT: 184.2 LBS | OXYGEN SATURATION: 98 % | HEART RATE: 67 BPM | BODY MASS INDEX: 26.37 KG/M2 | RESPIRATION RATE: 20 BRPM

## 2021-04-06 LAB
ANION GAP SERPL CALCULATED.3IONS-SCNC: 12 MMOL/L (ref 7–19)
BUN BLDV-MCNC: 13 MG/DL (ref 8–23)
CALCIUM SERPL-MCNC: 8.9 MG/DL (ref 8.8–10.2)
CHLORIDE BLD-SCNC: 106 MMOL/L (ref 98–111)
CO2: 27 MMOL/L (ref 22–29)
CREAT SERPL-MCNC: 0.8 MG/DL (ref 0.5–1.2)
GFR AFRICAN AMERICAN: >59
GFR NON-AFRICAN AMERICAN: >60
GLUCOSE BLD-MCNC: 104 MG/DL (ref 70–99)
GLUCOSE BLD-MCNC: 104 MG/DL (ref 74–109)
GLUCOSE BLD-MCNC: 108 MG/DL (ref 70–99)
GLUCOSE BLD-MCNC: 154 MG/DL (ref 70–99)
HCT VFR BLD CALC: 25 % (ref 42–52)
HCT VFR BLD CALC: 26.9 % (ref 42–52)
HEMOGLOBIN: 7.6 G/DL (ref 14–18)
HEMOGLOBIN: 8.3 G/DL (ref 14–18)
MCH RBC QN AUTO: 27.8 PG (ref 27–31)
MCH RBC QN AUTO: 27.9 PG (ref 27–31)
MCHC RBC AUTO-ENTMCNC: 30.4 G/DL (ref 33–37)
MCHC RBC AUTO-ENTMCNC: 30.9 G/DL (ref 33–37)
MCV RBC AUTO: 90.6 FL (ref 80–94)
MCV RBC AUTO: 91.6 FL (ref 80–94)
PDW BLD-RTO: 13.5 % (ref 11.5–14.5)
PDW BLD-RTO: 13.6 % (ref 11.5–14.5)
PERFORMED ON: ABNORMAL
PLATELET # BLD: 249 K/UL (ref 130–400)
PLATELET # BLD: 278 K/UL (ref 130–400)
PMV BLD AUTO: 9.1 FL (ref 9.4–12.4)
PMV BLD AUTO: 9.5 FL (ref 9.4–12.4)
POTASSIUM REFLEX MAGNESIUM: 3.8 MMOL/L (ref 3.5–5)
RBC # BLD: 2.73 M/UL (ref 4.7–6.1)
RBC # BLD: 2.97 M/UL (ref 4.7–6.1)
SODIUM BLD-SCNC: 145 MMOL/L (ref 136–145)
WBC # BLD: 5.6 K/UL (ref 4.8–10.8)
WBC # BLD: 5.9 K/UL (ref 4.8–10.8)

## 2021-04-06 PROCEDURE — 85027 COMPLETE CBC AUTOMATED: CPT

## 2021-04-06 PROCEDURE — 51798 US URINE CAPACITY MEASURE: CPT

## 2021-04-06 PROCEDURE — 6370000000 HC RX 637 (ALT 250 FOR IP): Performed by: STUDENT IN AN ORGANIZED HEALTH CARE EDUCATION/TRAINING PROGRAM

## 2021-04-06 PROCEDURE — 80048 BASIC METABOLIC PNL TOTAL CA: CPT

## 2021-04-06 PROCEDURE — 6370000000 HC RX 637 (ALT 250 FOR IP): Performed by: INTERNAL MEDICINE

## 2021-04-06 PROCEDURE — 51701 INSERT BLADDER CATHETER: CPT

## 2021-04-06 PROCEDURE — 82947 ASSAY GLUCOSE BLOOD QUANT: CPT

## 2021-04-06 PROCEDURE — 36415 COLL VENOUS BLD VENIPUNCTURE: CPT

## 2021-04-06 PROCEDURE — 2580000003 HC RX 258: Performed by: INTERNAL MEDICINE

## 2021-04-06 RX ORDER — TAMSULOSIN HYDROCHLORIDE 0.4 MG/1
0.4 CAPSULE ORAL DAILY
Qty: 30 CAPSULE | Refills: 0 | Status: SHIPPED | OUTPATIENT
Start: 2021-04-07

## 2021-04-06 RX ORDER — PANTOPRAZOLE SODIUM 40 MG/1
40 TABLET, DELAYED RELEASE ORAL
Qty: 60 TABLET | Refills: 0 | Status: SHIPPED | OUTPATIENT
Start: 2021-04-06

## 2021-04-06 RX ADMIN — SODIUM CHLORIDE, PRESERVATIVE FREE 10 ML: 5 INJECTION INTRAVENOUS at 10:29

## 2021-04-06 RX ADMIN — METOPROLOL SUCCINATE 25 MG: 25 TABLET, EXTENDED RELEASE ORAL at 10:28

## 2021-04-06 RX ADMIN — TAMSULOSIN HYDROCHLORIDE 0.4 MG: 0.4 CAPSULE ORAL at 10:28

## 2021-04-06 NOTE — DISCHARGE INSTR - COC
Continuity of Care Form    Patient Name: Jose Watts   :  1935  MRN:  [id number]    Admit date:  2021  Discharge date:  21    Code Status Order: [facility]   Advance Directives:   [facility] Documentation     Date/Time Healthcare Directive Type of Healthcare Directive Copy in [facility] [address] Agent's Name Healthcare Agent's Phone Number    21 3598  Unknown, patient unable to respond due to medical condition -- -- -- -- --          Admitting Physician:  Alexandra Chong MD  PCP: No primary care provider on file. Discharging Nurse: Heidi Demarco 23 Unit/Room#: 4765/965-45  Discharging Unit Phone Number: [phone]    Emergency Contact:   Extended Emergency Contact Information  Primary Emergency Contact: [facility]  Address: [address] [place] [address] [address] Phone: [phone]  Mobile Phone: [phone]  Relation: Child  Secondary Emergency Contact: [facility]  Address: [address] [place] [address] Phone: [phone]  Mobile Phone: [phone]  Relation: Child    Past Surgical History:  Past Surgical History:   Procedure Laterality Date    APPENDECTOMY      CHOLECYSTECTOMY, LAPAROSCOPIC  2012    COLECTOMY      COLONOSCOPY  2021    Dr Christie Hines in 3 months, CT/CEA ordered    RECTAL SURGERY      fissure repair x2       Immunization History: There is no immunization history on file for this patient.     Active Problems:  Patient Active Problem List   Diagnosis Code    S/P laparoscopic cholecystectomy Z90.49    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    Acute CVA (cerebrovascular accident) [address] I63.9    Memory deficit R41.3    Hard of hearing H91.90    Dementia ([facility] F03.90    B12 deficiency E53.8    Essential hypertension I10    Traumatic hemorrhage of right cerebrum (HCC) I09.972X    Benign prostatic hyperplasia with urinary obstruction N40.1, N13.8    History of colon cancer Z85.038    Palliative care patient Z51.5    Anemia D64.9    GI bleed K92.2       Isolation/Infection:   Isolation          No Isolation        Patient Infection Status     None to display          Nurse Assessment:  Last Vital Signs: /75   Pulse 67   Temp 98.2 °F (36.8 °C) (Temporal)   Resp 20   Ht 5' 10\" (1.778 m)   Wt 184 lb 3.2 oz (83.6 kg)   SpO2 98%   BMI 26.43 kg/m²     Last documented pain score (0-10 scale): Pain Level: 0  Last Weight:   Wt Readings from Last 1 Encounters:   04/01/21 184 lb 3.2 oz (83.6 kg)     Mental Status:  disoriented, alert and able to concentrate and follow conversation    IV Access:  - None    Nursing Mobility/ADLs:  Walking   Independent  Transfer  Independent  Bathing  Assisted  Dressing  Independent  Toileting  Independent  Feeding  [address]  Assisted  Med Delivery   whole    Wound Care Documentation and Therapy:        Elimination:  Continence:   · Bowel: Yes  · Bladder: Yes  Urinary Catheter: intermittent catheterization    Colostomy/Ileostomy/Ileal Conduit: none       Date of Last BM: 4/4/21    Intake/Output Summary (Last 24 hours) at 4/6/2021 1418  Last data filed at 4/6/2021 1154  Gross per 24 hour   Intake 320 ml   Output 2700 ml   Net -2380 ml     I/O last 3 completed shifts: In: 240 [P.O.:240]  Out: 900 [Urine:900]    Safety Concerns:     Sundowners [place]    Impairments/Disabilities:      None    Nutrition Therapy:  Current Nutrition Therapy:   - Oral Diet:  Carb Control 4 carbs/meal (1800kcals/day)    Routes of Feeding: Oral  Liquids: No Restrictions  Daily Fluid Restriction: no  Last Modified Barium Swallow with Video (Video Swallowing Test): not done    Treatments at the Time of Hospital Discharge:   Respiratory Treatments: n/a  Oxygen Therapy:  is not on home oxygen therapy.   Ventilator:    - No ventilator support    Rehab Therapies: Physical Therapy  Weight Bearing Status/Restrictions: No weight bearing restirctions  Other Medical Equipment (for information only, NOT a DME order):  catheter  Other Treatments: n/a    Patient's personal belongings (please select all that are sent with patient):  Dentures upper    RN SIGNATURE:  Electronically signed by Fili Louie RN on 4/6/21 at 2:22 PM CDT    CASE MANAGEMENT/SOCIAL WORK SECTION    Inpatient Status Date: ***    Readmission Risk Assessment Score:  Readmission Risk              Risk of Unplanned Readmission:        21           Discharging to Facility/ Agency   · Name:   · Address:  · Phone:  · Fax:    Dialysis Facility (if applicable)   · Name:  · Address:  · Dialysis Schedule:  · Phone:  · Fax:    / signature: {Esignature:[id number]}    PHYSICIAN SECTION    Prognosis: {Prognosis:7309696451}    Condition at Discharge: [facility] Patient Condition:710081263}    Rehab Potential (if transferring to Rehab): {Prognosis:0694675720}    Recommended Labs or Other Treatments After Discharge: ***    Physician Certification: I certify the above information and transfer of Rosanneadilene President  is necessary for the continuing treatment of the diagnosis listed and that he requires {Admit to Appropriate Level of Care:25739} for {GREATER/LESS:638751560} 30 days.      Update Admission H&P: {CHP DME Changes in PPRWD:150262305}    PHYSICIAN SIGNATURE:  {Esignature:[id number]}

## 2021-04-06 NOTE — PROGRESS NOTES
[facility]        Hospitalist Progress Note  4/6/2021 10:34 AM  Subjective:   Admit Date: 4/1/2021  PCP: No primary care provider on file. Chief Complaint: AMS    Subjective: Patient seen and examined at bedside. Getting agitated that he's still in the hospital. I have attempted to explain to him that we are working on this but he's unhappy. [facility] History: 81-year-old male with a past medical history of dementia, recent previous traumatic brain hemorrhage, hypertension, history of colon cancer presenting to the hospital for worsening mental status and abnormal gait found to be anemic with FOBT+ stool with gastroenterology consulted from the ER. Patient also noted to have acute CVA noted on CT head while in the emergency department. Neurology and gastroenterology consulted. Patient status post MRI brain showing hemorrhagic component of left frontal infarct. EGD/colonoscopy performed 4/3/2021; EGD showing esophagitis and gastritis; colonoscopy with poor preparation recommendation for repeat colonoscopy at a later date. Patient with new onset atrial fibrillation while in the endoscopy suite with cardiology consulted recommending rate control as tolerated given the patient refuses all medications. I have had an extensive discussion with the daughter at bedside regarding her father's care and his wishes with likely transition to hospice and comfort care at a facility in [place] as able. Hospice consulted. ROS: 14 point review of systems is negative except as specifically addressed above. DIET DYSPHAGIA SOFT AND BITE-SIZED;     Intake/Output Summary (Last 24 hours) at 4/6/2021 1034  Last data filed at 4/6/2021 1008  Gross per 24 hour   Intake 440 ml   Output 900 ml   Net -460 ml     Medications:   sodium chloride      sodium chloride      dextrose      sodium chloride      sodium chloride      sodium chloride Stopped (04/04/21 0759)     Current Facility-Administered Medications   Medication Dose Route Frequency Provider Last Rate Last Admin    tamsulosin (FLOMAX) capsule 0.4 mg  0.4 mg Oral Daily Nicole Hardy MD   0.4 mg at 04/06/21 1028    0.9 % sodium chloride infusion   Intravenous PRSAMARA Juarez MD        0.9 % sodium chloride infusion   Intravenous CANDYN Rupinder Juarez MD        atorvastatin (LIPITOR) tablet 40 mg  40 mg Oral Nightly Rupinder Juarez MD   40 mg at 04/05/21 2202    insulin lispro (HUMALOG) injection vial 0-6 Units  0-6 Units Subcutaneous TID WC Rupinder Juarez MD        insulin lispro (HUMALOG) injection vial 0-3 Units  0-3 Units Subcutaneous Nightly Rupinder Juarez MD        glucose (GLUTOSE) 40 % oral gel 15 g  15 g Oral PRN Rupinder Juarez MD        dextrose 50 % IV solution  12.5 g Intravenous PRN Rupinder Juarez MD        glucagon (rDNA) injection 1 mg  1 mg Intramuscular PRN Rupinder Juarez MD        dextrose 5 % solution  100 mL/hr Intravenous PRMD Analia Yang.9 % sodium chloride infusion   Intravenous PRSAMARA Juarez MD        metoprolol succinate (TOPROL XL) extended release tablet 25 mg  25 mg Oral BID Michelet Gutierrez MD   25 mg at 04/06/21 1028    [Held by provider] cholestyramine light packet 4 g  4 g Oral Daily Michelet Gutierrez MD       [facility] by provider] lisinopril (PRINIVIL;ZESTRIL) tablet 5 mg  5 mg Oral Daily Michelet Gutierrez MD        QUEtiapine (SEROQUEL) tablet 25 mg  25 mg Oral Nightly PRN Rupinder Juarez MD   25 mg at 04/01/21 2249    [Held by provider] loperamide (IMODIUM) capsule 2 mg  2 mg Oral TID PRN Michelet Gutierrez MD       [facility] by provider] donepezil (ARICEPT) tablet 10 mg  10 mg Oral Daily Michelet Gutierrez MD        [Held by provider] dextromethorphan-quiNIDine (NUEDEXTA) 20-10 MG per capsule 1 capsule  1 capsule Oral Daily Michelet Gutierrez MD        pantoprazole (PROTONIX) injection 40 mg  40 mg Intravenous BID Rupinder Juarez MD   40 mg at 04/05/21 2201    And    sodium chloride (PF) 0.9 % injection 10 mL  10 mL Intravenous BID Bo Lopez MD   10 mL at 04/05/21 2202    sodium chloride flush 0.9 % injection 10 mL  10 mL Intravenous 2 times per day Bo Lopez MD   10 mL at 04/06/21 1029    sodium chloride flush 0.9 % injection 10 mL  10 mL Intravenous PRN Bo Lopez MD        0.9 % sodium chloride infusion  25 mL Intravenous PRN Bo Lopez MD        promethazine (PHENERGAN) tablet 12.5 mg  12.5 mg Oral Q6H PRN Bo Lopez MD        Or    ondansetron [facility]) injection 4 mg  4 mg Intravenous Q6H PRN Bo Lopez MD        0.9 % sodium chloride infusion   Intravenous Continuous Bo Lopez MD   Stopped at 04/04/21 0759    acetaminophen (TYLENOL) tablet 650 mg  650 mg Oral Q4H PRN Bo Lopez MD        Or   [facility] acetaminophen (TYLENOL) suppository 650 mg  650 mg Rectal Q4H PRN Bo Lopez MD        labetalol (NORMODYNE;TRANDATE) injection 10 mg  10 mg Intravenous Q10 Min PRN Bo Lopez MD            Labs:     Recent Labs     04/05/21  1359 04/05/21  2202 04/06/21  0843   WBC 6.0 5.7 5.9   RBC 2.94* 2.74* 2.97*   HGB 8.1* 7.7* 8.3*   HCT 26.7* 24.6* 26.9*   MCV 90.8 89.8 90.6   MCH 27.6 28.1 27.9   MCHC 30.3* 31.3* 30.9*    264 278     Recent Labs     04/04/21  0209 04/06/21  0843    145   K 3.8 3.8   ANIONGAP 9 12    106   CO2 25 27   BUN 15 13   CREATININE 0.8 0.8   GLUCOSE 86 104   CALCIUM 8.1* 8.9     No results for input(s): MG, PHOS in the last 72 hours. No results for input(s): AST, ALT, ALB, BILITOT, ALKPHOS, ALB in the last 72 hours. ABGs:No results for input(s): PH, PO2, PCO2, HCO3, BE, O2SAT in the last 72 hours. Troponin T: No results for input(s): TROPONINI in the last 72 hours. INR:   No results for input(s): INR in the last 72 hours. Lactic Acid: No results for input(s): LACTA in the last 72 hours.     Objective:   Vitals: BP (!) 152/77   Pulse 68   Temp 97.5 °F (36.4 °C) (Temporal) Resp 18   Ht 5' 10\" (1.778 m)   Wt 184 lb 3.2 oz (83.6 kg)   SpO2 97%   BMI 26.43 kg/m²   24HR INTAKE/OUTPUT:      Intake/Output Summary (Last 24 hours) at 4/6/2021 1034  Last data filed at 4/6/2021 1008  Gross per 24 hour   Intake 440 ml   Output 900 ml   Net -460 ml        General appearance: alert and cooperative with exam  HEENT: AT/NC  Lungs: no increased work of breathing, BLAE, no wheezing appreciated  Heart: RRR, no murmurs appreciated  Abdomen: BS+, soft, NT, ND  Extremities: no edema, pulses+  Neurologic: Alert, gross motor function intact  Skin: warm    Assessment and Plan:   Principal Problem:    GI bleed  Active Problems:    Anemia  Resolved Problems:    * No resolved hospital problems. *    GIB: PPI BID. GI on board. Transfuse PRN. Monitor H/H and transfuse as needed. New onset atrial fibrillation: Cardiology on board. Rate control as tolerated. Monitor on telemetry. CVA/AMS/Dementia: Statin. Hold aspirin for GIB. Neurology on board. Supportive management. PT/OT/SLP. Urinary retention: Bladder scan and straight cath as needed. Hospice consult still pending. SW/CM working with family to arrange discharge.     Advance Directive: DNR-CC    DVT prophylaxis: SCDs    Discharge planning: TBD - likely transition to hospice after hospice evaluation and once placement found      Signed:  Ramón Villalobos MD 4/6/2021 10:34 AM  Rounding Hospitalist

## 2021-04-06 NOTE — PROGRESS NOTES
Physical Therapy  Name: Marii Salmon  MRN:  [id number]  Date of service:  4/6/2021 04/06/21 0949   General   Missed reason Patient declined   Subjective   Subjective Pt refused stating he has things to do.         Electronically signed by Jessica Yarbrough PTA on 4/6/2021 at 9:49 AM

## 2021-04-06 NOTE — PROGRESS NOTES
Patient bladder scan read 639 ml. Straight cath performed. 900 ml of clear yellow urine obtained. Patient tolerated well. Resting comfortably in bed with eyes closed. No discomfort noted at this time.     Electronically signed by Yessica Truong RN on 4/5/2021 at 10:44 PM

## 2021-04-06 NOTE — PLAN OF CARE
Problem: Falls - Risk of:  Goal: Will remain free from falls  Description: Will remain free from falls  4/6/2021 1247 by Nahun Rivas RN  Outcome: Ongoing  4/6/2021 0007 by Ann Medeiros RN  Outcome: Ongoing  Goal: Absence of physical injury  Description: Absence of physical injury  4/6/2021 1247 by Nahun Rivas RN  Outcome: Ongoing  4/6/2021 0007 by Ann Medeiros RN  Outcome: Ongoing     Problem: Skin Integrity:  Goal: Will show no infection signs and symptoms  Description: Will show no infection signs and symptoms  4/6/2021 1247 by Nahun Rivas RN  Outcome: Ongoing  4/6/2021 0007 by Ann Medeiros RN  Outcome: Ongoing  Goal: Absence of new skin breakdown  Description: Absence of new skin breakdown  4/6/2021 1247 by Nahun Rivas RN  Outcome: Ongoing  4/6/2021 0007 by Ann Medeiros RN  Outcome: Ongoing     Problem: HEMODYNAMIC STATUS  Goal: Patient has stable vital signs and fluid balance  4/6/2021 1247 by Nahun Rivas RN  Outcome: Ongoing  4/6/2021 0007 by Ann Medeiros RN  Outcome: Ongoing     Problem: ACTIVITY INTOLERANCE/IMPAIRED MOBILITY  Goal: Mobility/activity is maintained at optimum level for patient  4/6/2021 1247 by Nahun Rivas RN  Outcome: Ongoing  4/6/2021 0007 by Ann Medeiros RN  Outcome: Ongoing     Problem: COMMUNICATION IMPAIRMENT  Goal: Ability to express needs and understand communication  4/6/2021 1247 by Nahun Rivas RN  Outcome: Ongoing  4/6/2021 0007 by Ann Medeiros RN  Outcome: Ongoing     Problem: Bleeding:  Goal: Will show no signs and symptoms of excessive bleeding  Description: Will show no signs and symptoms of excessive bleeding  4/6/2021 1247 by Nahun Rivas RN  Outcome: Ongoing  4/6/2021 0007 by Ann Medeiros RN  Outcome: Ongoing     Problem: Health Behavior:  Goal: Identification of resources available to assist in meeting health care needs will improve  Description: Identification of resources available to assist in meeting health care needs will improve  4/6/2021 1247 by Og Finney RN  Outcome: Ongoing  4/6/2021 0007 by Mickey Shreshta RN  Outcome: Ongoing     Problem: Role Relationship:  Goal: Ability to participate appropriately in conversations will improve  Description: Ability to participate appropriately in conversations will improve  4/6/2021 1247 by Og Finney RN  Outcome: Ongoing  4/6/2021 0007 by Mickey Shrestha RN  Outcome: Ongoing     Problem: Safety:  Goal: Ability to remain free from injury will improve  Description: Ability to remain free from injury will improve  4/6/2021 1247 by Og Finney RN  Outcome: Ongoing  4/6/2021 0007 by Mickey Shrestha RN  Outcome: Ongoing     Problem: Self-Care:  Goal: Ability to participate in self-care as condition permits will improve  Description: Ability to participate in self-care as condition permits will improve  4/6/2021 1247 by Og Finney RN  Outcome: Ongoing  4/6/2021 0007 by Mickey Shrestha RN  Outcome: Ongoing

## 2021-04-06 NOTE — CARE COORDINATION
Attempted to speak with pt's dtr re: dc plan. Plan yesterday was for pt to dc to [facility]. Dtr upset that pt can not go skilled, then hung up on this SW. Will try to follow up again later.    Electronically signed by Lg Mg on 4/6/2021 at 10:15 AM

## 2021-04-06 NOTE — PROGRESS NOTES
Asked by SO to call dtr and talk with her about hospice services. I did call and speak with pt dtr, Tate Johnson. She very quickly dismissed hospice conversation stating, \"I think he is improving, well physically, of course he still has dementia\". She states \"We are trying to get him moved into a nursing home in [place]". She adds that if we need hospice at that time we want it to be in [place]. I did tell her if anything changes and she is interested in hospice services to please call. She thanked me for the call. SO notified of out conversation.   Electronically signed by Shane Romo RN on 4/6/2021 at 10:58 AM

## 2021-04-11 LAB
EKG P AXIS: NORMAL DEGREES
EKG P-R INTERVAL: NORMAL MS
EKG Q-T INTERVAL: 360 MS
EKG QRS DURATION: 126 MS
EKG QTC CALCULATION (BAZETT): 419 MS
EKG T AXIS: -51 DEGREES

## 2021-05-17 NOTE — DISCHARGE SUMMARY
Discharge Summary      Colleen Bryan  :  1935  MRN:  [id number]    Admit date:  2021  Discharge date:      Admitting Physician:  Shanel Valentino MD    Advance Directive: [facility]    Consults: cardiology, GI and neurology    Primary Care Physician:  No primary care provider on file. Discharge Diagnoses:  Principal Problem:    GI bleed  Active Problems:    Anemia  Resolved Problems:    * No resolved hospital problems. *      Significant Diagnostic Studies:   Echo Complete 2d W Doppler W Color    Result Date: 2021  Transthoracic Echocardiography Report (TTE)  Demographics   Patient Name  Melva Linton Date of Study          2021   MRN           [id number]            Gender                 Male   Date of Birth 1935        Room Number            [facility]   Age           81 year(s)   Height:       70 inches         Referring Physician    baldomero vallejo   Weight:       184 pounds        Sonographer            Carrol Soto [facility]   BSA:          2.01 m^2          Interpreting Physician Perry Mcguire MD   BMI:          26.4 kg/m^2  Procedure Type of Study   TTE procedure:ECHO NO CONTRAST WITH DOP/COLR. Study Location: Echo Lab Technical Quality: Adequate visualization Patient Status: Inpatient Contrast Medium: Bubble Study. Indications:CVA. Conclusions   Summary  LV is normal in size with normal systolic function. LV ejection fraction  estimated at 55 to 60%. Mild concentric LVH. Probable grade 2 diastolic dysfunction. RV appears mildly dilated with preserved RV systolic function. Moderate left atrial enlargement. Mild to moderate right atrial enlargement. Aortic valve is trileaflet with mild leaflet thickening but preserved  leaflet mobility. No stenosis. Mild aortic regurgitation. Mitral valve is mild to moderately thickened with mild prolapse of  anterior leaflet. Mild eccentric posteriorly directed mitral  regurgitation. No stenosis. Mild tricuspid regurgitation.   No significant pericardial Patient in bed;alert and oriented x3;up as tolerated.  NGT to LIS.  BS hypoactive;denies passing gas.  No nausea nor vomiting.  Morphine given for pain relief.  Meds given per MAR.  Call light placed within reach.  Safety and hourly rounding implemented.  SCD's in place.  Safety and hourly rounding implemented.  Needs attended.       effusion. Signature   ----------------------------------------------------------------  Electronically signed by Kevin Mcguire MD(Interpreting physician)  on 04/02/2021 06:14 PM  ----------------------------------------------------------------  M-Mode Measurements (cm)   LVIDd: 4.72 cm                         LVIDs: 3.1 cm  IVSd: 1.21 cm  LVPWd: 1.21 cm                         AO Root Dimension: 3.5 cm  % Ejection Fraction: 63.2 %            LA: 4.5 cm                                         LVOT: 2 cm  Doppler Measurements:   AV Peak Velocity:184 cm/s            MV Peak E-Wave: 61.8 cm/s  AV Peak Gradient: 13.54 mmHg         MV Peak A-Wave: 53 cm/s  AV Mean Gradient: 7 mmHg             MV E/A Ratio: 1.17 %  AV Area (Continuity):1.61 cm^2       MV Peak Gradient: 1.53 mmHg  TR Velocity:257 cm/s                 MV P1/2t: 57 msec  TR Gradient:26.42 mmHg               MVA by PHT3.86 cm^2  Estimated RAP:20 mmHg  RVSP:45 mmHg      Ct Head Wo Contrast    Result Date: 4/1/2021  CT HEAD WO CONTRAST 4/1/2021 5:02 PM HISTORY: Altered gait COMPARISON: CT scan dated 10/23/2020 DOSE LENGTH PRODUCT: 760 mGy cm TECHNIQUE: Helical tomographic images of the brain were obtained without the use of intravenous contrast. Automated exposure control was also utilized to decrease patient radiation dose. FINDINGS: There is a 2.8 cm cortical and subcortical hypodensity in the anterior left frontal lobe which is suspicious for an acute cortical infarct. Underlying advanced chronic small vessel ischemic change with global cortical atrophy. There is an old lacunar infarct in the deep right frontal lobe. No intra-axial or extra-axial hemorrhage. No hydrocephalus. Orbital contents are unremarkable. The paranasal sinuses and mastoids are clear. Calvarium is intact. 1. There is a new 2.8 cm hypodensity cortical/subcortical hypodensity in the anterior left frontal lobe, most suspicious for an acute frontal lobe cortical infarct. Otherwise stable. Signed by Dr Florin Jimenez on 4/1/2021 6:38 PM    Vl Dup Carotid Bilateral    Result Date: 4/3/2021  Vascular Carotid Procedure  Demographics   Patient Name    Jane Man  Age                  81                  R   Patient Number  [id number]           Gender               Male   Visit Number    [id number]        Interpreting         Ranjeet Robert MD                                   Physician   Date of Birth   1935       Referring Physician  baldomero vallejo   Accession       [id number]       [address],  Number                                                RVT  Procedure Type of Study:   Cerebral:Carotid, VL CAROTID BILATERAL. Indications for Study:CVA. Risk Factors   - The patient's risk factor(s) include: arterial hypertension. Impression   There is smooth plaque visualized in the bilateral internal carotid  artery(ies). There is less than 50% stenosis in the right internal carotid artery. There is less than 50% stenosis of the left internal carotid artery. There is normal antegrade flow in the bilateral vertebral artery(ies). Signature   ----------------------------------------------------------------  Electronically signed by Ranjeet Rboert MD(Interpreting  physician) on 04/03/2021 08:16 AM  ----------------------------------------------------------------  Blood Pressure:Left arm 106/64 mmHg. Velocities are measured in cm/s ; Diameters are measured in mm Carotid Right Measurements +------------+-------+-------+--------+-------+------------+---------------+ ! Location    ! PSV    ! EDV    ! Angle   ! RI     !%Stenosis   ! Tortuosity     ! +------------+-------+-------+--------+-------+------------+---------------+ ! Prox CCA    !59.3   !14.9   !60      !0.75   !            !               ! +------------+-------+-------+--------+-------+------------+---------------+ ! Mid CCA     !63.6   !14.9   !60      !0.77   !            !               ! +------------+-------+-------+--------+-------+------------+---------------+ ! Prox ICA    !68     !17.6   ! 60      !0.74   !            !               ! +------------+-------+-------+--------+-------+------------+---------------+ ! Mid ICA     !68     !17.6   ! 60      !0.74   !            !               ! +------------+-------+-------+--------+-------+------------+---------------+ ! Dist ICA    ! 70.9   !18.8   !60      !0.73   !            !               ! +------------+-------+-------+--------+-------+------------+---------------+ ! Prox ECA    !78.6   !12.9   !60      !0.84   !            !               ! +------------+-------+-------+--------+-------+------------+---------------+ ! Vertebral   !25.2   !7.62   !60      !0.7    ! !               ! +------------+-------+-------+--------+-------+------------+---------------+   - There is antegrade vertebral flow noted on the right side. - Additional Measurements:ICAPSV/CCAPSV 1.2. ICAEDV/CCAEDV 1.26. Carotid Left Measurements +------------+-------+-------+--------+-------+------------+---------------+ ! Location    ! PSV    ! EDV    ! Angle   ! RI     !%Stenosis   ! Tortuosity     ! +------------+-------+-------+--------+-------+------------+---------------+ ! Prox CCA    !63.9   !20.5   !60      !0.68   !            !               ! +------------+-------+-------+--------+-------+------------+---------------+ ! Mid CCA     !61.6   !14.7   !60      !0.76   !            !               ! +------------+-------+-------+--------+-------+------------+---------------+ ! Prox ICA    !54.6   !14.5   !60      !0.73   !            !               ! +------------+-------+-------+--------+-------+------------+---------------+ ! Mid ICA     !76.2   !26.7   ! 60      !0.65   !            !               ! +------------+-------+-------+--------+-------+------------+---------------+ ! Dist ICA    ! 69.9   !22.4   !60      !0.68   !            !               ! +------------+-------+-------+--------+-------+------------+---------------+ ! Prox ECA    !66.3   !9.97   !60      !0.85   !            !               ! +------------+-------+-------+--------+-------+------------+---------------+ ! Vertebral   !39.3   !9.82   !60      !0.75   !            !               ! +------------+-------+-------+--------+-------+------------+---------------+   - There is antegrade vertebral flow noted on the left side. - Additional Measurements:ICAPSV/CCAPSV 1.19. ICAEDV/CCAEDV 1.3. Mri Brain Wo Contrast    Result Date: 4/2/2021  EXAMINATION: MRI BRAIN WO CONTRAST 4/2/2021 4:54 PM HISTORY: EXAM: MR BRAIN WITHOUT IV CONTRAST COMPARISON: MRI September 1, 2019 HISTORY: 81years-old Male. Left frontal infarct on CT TECHNIQUE: Routine pulse sequences of the brain were obtained without IV contrast. REPORT: The ventricles and cerebrospinal fluid spaces are normal in size and configuration for the patient's age. There is no evidence of mass-effect or midline shift. Restricted diffusion is present in the left frontal infarction. Increased signal is present on the T1 sequence and this represents a hemorrhagic component. In the right frontal lobe T2 shine through is present. A few nonspecific bilateral white matter T2/FLAIR abnormal signal, with no diffusion restriction, but likely reflecting chronic microvascular changes. A right paraventricular lacunar infarction is noted. This was present on CT scan April 1, 2021 The brainstem, sella, pituitary, cerebellum are unremarkable. The basilar cisterns are preserved. The intraorbital structures are unremarkable. The flow voids are preserved. No acute osseous lesion. The visualized portions of the paranasal sinuses are unremarkable. A right mastoid effusion is present. .     1. Hemorrhagic component is now associated with the left frontal infarction. 2. Right mastoid effusion 3. T2 shine through is noted in the right frontal cortex.  4. Changes of aging Signed by Dr Ana Andrews on 4/2/2021 5:01 PM    Egd    Result Date: 4/2/2021  No dictation       Pertinent Labs:   CBC:   Recent Labs     04/05/21  2202 04/06/21  0843 04/06/21  1330   WBC 5.7 5.9 5.6   HGB 7.7* 8.3* 7.6*    278 249     BMP:    Recent Labs     04/04/21  0209 04/06/21  0843    145   K 3.8 3.8    106   CO2 25 27   BUN 15 13   CREATININE 0.8 0.8   GLUCOSE 86 104     INR: No results for input(s): INR in the last 72 hours. ABGs:No results for input(s): PH, PO2, PCO2, HCO3, BE, O2SAT in the last 72 hours. Lactic Acid:No results for input(s): LACTA in the last 72 hours. Procedures:     EGD  IMPRESSION:  1. Esophagitis  2. Gastritis  3. Normal duodenum     RECOMMENDATIONS:    1. Await path results, the patient will be contacted in 7-10 days with biopsy results. 2.  Colonoscopy today. Colonoscopy  Findings:      There was a moderate to large amount of stool seen throughout the entire examined colon. I was able to irrigate and lavage small amount, however significant amount remain. The stool in the colon calls poor visualization. A colonoscopy was ceased after reaching the proximal transverse colon.       Recommendations:  1. Obtain a CT of the abdomen and pelvis  2. Obtain a CEA  3. Recommend an outpatient colonoscopy within the next 3 months;  Recommended 2 day prep. Hospital Course: 81-year-old male with a past medical history of dementia, recent previous traumatic brain hemorrhage, hypertension, history of colon cancer presenting to the hospital for worsening mental status and abnormal gait found to be anemic with FOBT+ stool with gastroenterology consulted from the ER. Patient also noted to have acute CVA noted on CT head while in the emergency department. Neurology and gastroenterology consulted. Patient status post MRI brain showing hemorrhagic component of left frontal infarct.   EGD/colonoscopy performed 4/3/2021; EGD showing esophagitis and gastritis; Discharge Instructions: Follow up with No primary care provider on file. in 7 days. Take medications as directed. Resume activity as tolerated. Diet: DIET DYSPHAGIA SOFT AND BITE-SIZED; Disposition: Patient is medically stable and will be discharged Skilled nursing facility. Time spent on discharge 35 minutes.     Signed:  Cony Jeffery MD 4/6/2021 1:56 PM

## 2021-08-11 NOTE — PROGRESS NOTES
[facility] asking for 8/6/21 EKG and DKW office note.  Please fax [phone] Received message that Mr. Ronan Cunningham older daughter was concerned about him discharging this afternoon. Went and discussed concerns with daughter. She states she was worried about [place] traffic and his needs to use the restroom. Discussed we would send a clean urinal with patient in the event of an emergency but recommended planning ahead to different locations to stop should the need arise. I asked what other concerns she had and she said none that she was frustrated with family issues as well. She voiced understanding, stated her concerns were nothing to do with his care at this facility or with him being discharged this afternoon and further went on to discuss personal family matters.

## (undated) DEVICE — SENSR O2 OXIMAX FNGR A/ 18IN NONSTR

## (undated) DEVICE — Device: Brand: DEFENDO AIR/WATER/SUCTION AND BIOPSY VALVE

## (undated) DEVICE — YANKAUER,BULB TIP WITH VENT: Brand: ARGYLE

## (undated) DEVICE — ENDOGATOR AUXILIARY WATER JET CONNECTOR: Brand: ENDOGATOR

## (undated) DEVICE — THE CHANNEL CLEANING BRUSH IS A NYLON FLEXI BRUSH ATTACHED TO A FLEXIBLE PLASTIC SHEATH DESIGNED TO SAFELY REMOVE DEBRIS FROM FLEXIBLE ENDOSCOPES.

## (undated) DEVICE — FORCEPS BX L240CM JAW DIA2.4MM ORNG L CAP W/ NDL DISP RAD

## (undated) DEVICE — TBG SMPL FLTR LINE NASL 02/C02 A/ BX/100

## (undated) DEVICE — MASK,OXYGEN,MED CONC,ADLT,7' TUB, UC: Brand: PENDING

## (undated) DEVICE — ENDO KIT,LOURDES HOSPITAL: Brand: MEDLINE INDUSTRIES, INC.